# Patient Record
Sex: FEMALE | Race: WHITE | NOT HISPANIC OR LATINO | Employment: OTHER | URBAN - METROPOLITAN AREA
[De-identification: names, ages, dates, MRNs, and addresses within clinical notes are randomized per-mention and may not be internally consistent; named-entity substitution may affect disease eponyms.]

---

## 2017-03-27 ENCOUNTER — TRANSCRIBE ORDERS (OUTPATIENT)
Dept: ADMINISTRATIVE | Facility: HOSPITAL | Age: 82
End: 2017-03-27

## 2017-03-27 DIAGNOSIS — Z12.31 VISIT FOR SCREENING MAMMOGRAM: Primary | ICD-10-CM

## 2017-05-08 ENCOUNTER — HOSPITAL ENCOUNTER (OUTPATIENT)
Dept: MAMMOGRAPHY | Facility: HOSPITAL | Age: 82
Discharge: HOME | End: 2017-05-08
Attending: INTERNAL MEDICINE | Admitting: INTERNAL MEDICINE

## 2017-05-08 DIAGNOSIS — Z12.31 VISIT FOR SCREENING MAMMOGRAM: ICD-10-CM

## 2017-05-08 PROCEDURE — G0202 SCR MAMMO BI INCL CAD: HCPCS

## 2017-05-08 PROCEDURE — 77063 BREAST TOMOSYNTHESIS BI: CPT

## 2017-05-08 PROCEDURE — 77063 BREAST TOMOSYNTHESIS BI: CPT | Performed by: RADIOLOGY

## 2017-05-08 PROCEDURE — G0202 SCR MAMMO BI INCL CAD: HCPCS | Performed by: RADIOLOGY

## 2018-04-10 ENCOUNTER — TRANSCRIBE ORDERS (OUTPATIENT)
Dept: ADMINISTRATIVE | Facility: HOSPITAL | Age: 83
End: 2018-04-10

## 2018-04-10 DIAGNOSIS — Z12.31 VISIT FOR SCREENING MAMMOGRAM: Primary | ICD-10-CM

## 2018-06-04 ENCOUNTER — HOSPITAL ENCOUNTER (OUTPATIENT)
Dept: MAMMOGRAPHY | Facility: HOSPITAL | Age: 83
Discharge: HOME | End: 2018-06-04
Attending: INTERNAL MEDICINE | Admitting: INTERNAL MEDICINE

## 2018-06-04 DIAGNOSIS — Z12.31 VISIT FOR SCREENING MAMMOGRAM: ICD-10-CM

## 2018-06-04 PROCEDURE — 77063 BREAST TOMOSYNTHESIS BI: CPT | Performed by: RADIOLOGY

## 2018-06-04 PROCEDURE — 77067 SCR MAMMO BI INCL CAD: CPT | Performed by: RADIOLOGY

## 2018-06-04 PROCEDURE — 77067 SCR MAMMO BI INCL CAD: CPT

## 2018-06-04 PROCEDURE — 77063 BREAST TOMOSYNTHESIS BI: CPT

## 2019-01-25 PROBLEM — R35.0 URINARY FREQUENCY: Status: ACTIVE | Noted: 2019-01-25

## 2019-01-25 PROBLEM — R35.1 NOCTURIA: Status: ACTIVE | Noted: 2019-01-25

## 2019-01-25 PROBLEM — M81.0 SENILE OSTEOPOROSIS: Status: ACTIVE | Noted: 2019-01-25

## 2019-01-25 PROBLEM — Z98.890 STATUS POST CARPAL TUNNEL RELEASE: Status: ACTIVE | Noted: 2019-01-25

## 2019-01-25 PROBLEM — F51.01 PRIMARY INSOMNIA: Status: ACTIVE | Noted: 2019-01-25

## 2019-01-25 PROBLEM — R42 LIGHT-HEADED FEELING: Status: ACTIVE | Noted: 2019-01-25

## 2019-01-25 PROBLEM — E78.2 MIXED HYPERLIPIDEMIA: Status: ACTIVE | Noted: 2019-01-25

## 2019-01-25 PROBLEM — R03.0 ELEVATED BLOOD PRESSURE READING: Status: ACTIVE | Noted: 2019-01-25

## 2019-03-15 RX ORDER — SIMVASTATIN 10 MG
TABLET ORAL
Qty: 90 TABLET | Refills: 0 | Status: SHIPPED | OUTPATIENT
Start: 2019-03-15 | End: 2019-05-16 | Stop reason: SDUPTHER

## 2019-04-24 PROBLEM — M25.471 ANKLE EDEMA, BILATERAL: Status: ACTIVE | Noted: 2019-04-24

## 2019-04-24 PROBLEM — J30.9 ALLERGIC RHINITIS: Status: ACTIVE | Noted: 2019-04-24

## 2019-04-24 PROBLEM — K64.4 EXTERNAL HEMORRHOID: Status: ACTIVE | Noted: 2019-04-24

## 2019-04-24 PROBLEM — M19.90 OSTEOARTHRITIS: Status: ACTIVE | Noted: 2019-04-24

## 2019-04-24 PROBLEM — M25.472 ANKLE EDEMA, BILATERAL: Status: ACTIVE | Noted: 2019-04-24

## 2019-04-29 ENCOUNTER — TELEPHONE (OUTPATIENT)
Dept: INTERNAL MEDICINE | Facility: CLINIC | Age: 84
End: 2019-04-29

## 2019-04-30 DIAGNOSIS — R35.0 URINARY FREQUENCY: ICD-10-CM

## 2019-04-30 DIAGNOSIS — M81.0 SENILE OSTEOPOROSIS: ICD-10-CM

## 2019-04-30 DIAGNOSIS — R03.0 ELEVATED BLOOD PRESSURE READING: ICD-10-CM

## 2019-04-30 DIAGNOSIS — F51.01 PRIMARY INSOMNIA: ICD-10-CM

## 2019-04-30 DIAGNOSIS — E78.2 MIXED HYPERLIPIDEMIA: Primary | ICD-10-CM

## 2019-05-06 ENCOUNTER — LAB (OUTPATIENT)
Dept: LAB | Facility: HOSPITAL | Age: 84
End: 2019-05-06

## 2019-05-06 ENCOUNTER — OFFICE VISIT (OUTPATIENT)
Dept: INTERNAL MEDICINE | Facility: CLINIC | Age: 84
End: 2019-05-06

## 2019-05-06 VITALS
HEIGHT: 65 IN | BODY MASS INDEX: 20.83 KG/M2 | SYSTOLIC BLOOD PRESSURE: 148 MMHG | HEART RATE: 60 BPM | WEIGHT: 125 LBS | DIASTOLIC BLOOD PRESSURE: 70 MMHG

## 2019-05-06 DIAGNOSIS — F51.01 PRIMARY INSOMNIA: ICD-10-CM

## 2019-05-06 DIAGNOSIS — R03.0 ELEVATED BLOOD PRESSURE READING: ICD-10-CM

## 2019-05-06 DIAGNOSIS — E78.2 MIXED HYPERLIPIDEMIA: Primary | ICD-10-CM

## 2019-05-06 DIAGNOSIS — R35.0 URINARY FREQUENCY: ICD-10-CM

## 2019-05-06 DIAGNOSIS — M81.0 SENILE OSTEOPOROSIS: ICD-10-CM

## 2019-05-06 DIAGNOSIS — E78.2 MIXED HYPERLIPIDEMIA: ICD-10-CM

## 2019-05-06 LAB
25(OH)D3 SERPL-MCNC: 53 NG/ML (ref 30–100)
ALBUMIN SERPL-MCNC: 4.2 G/DL (ref 3.5–5.2)
ALBUMIN/GLOB SERPL: 1.3 G/DL
ALP SERPL-CCNC: 71 U/L (ref 39–117)
ALT SERPL W P-5'-P-CCNC: 14 U/L (ref 1–33)
ANION GAP SERPL CALCULATED.3IONS-SCNC: 10.2 MMOL/L
AST SERPL-CCNC: 28 U/L (ref 1–32)
BASOPHILS # BLD AUTO: 0.02 10*3/MM3 (ref 0–0.2)
BASOPHILS NFR BLD AUTO: 0.5 % (ref 0–1.5)
BILIRUB SERPL-MCNC: 0.6 MG/DL (ref 0.2–1.2)
BUN BLD-MCNC: 11 MG/DL (ref 8–23)
BUN/CREAT SERPL: 13.1 (ref 7–25)
CALCIUM SPEC-SCNC: 9.2 MG/DL (ref 8.6–10.5)
CHLORIDE SERPL-SCNC: 99 MMOL/L (ref 98–107)
CHOLEST SERPL-MCNC: 175 MG/DL (ref 0–200)
CO2 SERPL-SCNC: 26.8 MMOL/L (ref 22–29)
CREAT BLD-MCNC: 0.84 MG/DL (ref 0.57–1)
DEPRECATED RDW RBC AUTO: 45.3 FL (ref 37–54)
EOSINOPHIL # BLD AUTO: 0.06 10*3/MM3 (ref 0–0.4)
EOSINOPHIL NFR BLD AUTO: 1.4 % (ref 0.3–6.2)
ERYTHROCYTE [DISTWIDTH] IN BLOOD BY AUTOMATED COUNT: 12.9 % (ref 12.3–15.4)
GFR SERPL CREATININE-BSD FRML MDRD: 65 ML/MIN/1.73
GLOBULIN UR ELPH-MCNC: 3.2 GM/DL
GLUCOSE BLD-MCNC: 98 MG/DL (ref 65–99)
HCT VFR BLD AUTO: 41.8 % (ref 34–46.6)
HDLC SERPL-MCNC: 90 MG/DL (ref 40–60)
HGB BLD-MCNC: 13.2 G/DL (ref 12–15.9)
IMM GRANULOCYTES # BLD AUTO: 0.01 10*3/MM3 (ref 0–0.05)
IMM GRANULOCYTES NFR BLD AUTO: 0.2 % (ref 0–0.5)
LDLC SERPL CALC-MCNC: 71 MG/DL (ref 0–100)
LDLC/HDLC SERPL: 0.78 {RATIO}
LYMPHOCYTES # BLD AUTO: 1.32 10*3/MM3 (ref 0.7–3.1)
LYMPHOCYTES NFR BLD AUTO: 29.9 % (ref 19.6–45.3)
MCH RBC QN AUTO: 30.1 PG (ref 26.6–33)
MCHC RBC AUTO-ENTMCNC: 31.6 G/DL (ref 31.5–35.7)
MCV RBC AUTO: 95.2 FL (ref 79–97)
MONOCYTES # BLD AUTO: 0.46 10*3/MM3 (ref 0.1–0.9)
MONOCYTES NFR BLD AUTO: 10.4 % (ref 5–12)
NEUTROPHILS # BLD AUTO: 2.54 10*3/MM3 (ref 1.7–7)
NEUTROPHILS NFR BLD AUTO: 57.6 % (ref 42.7–76)
NRBC BLD AUTO-RTO: 0 /100 WBC (ref 0–0.2)
PLATELET # BLD AUTO: 229 10*3/MM3 (ref 140–450)
PMV BLD AUTO: 12.2 FL (ref 6–12)
POTASSIUM BLD-SCNC: 3.8 MMOL/L (ref 3.5–5.2)
PROT SERPL-MCNC: 7.4 G/DL (ref 6–8.5)
RBC # BLD AUTO: 4.39 10*6/MM3 (ref 3.77–5.28)
SODIUM BLD-SCNC: 136 MMOL/L (ref 136–145)
TRIGL SERPL-MCNC: 72 MG/DL (ref 0–150)
VLDLC SERPL-MCNC: 14.4 MG/DL (ref 5–40)
WBC NRBC COR # BLD: 4.41 10*3/MM3 (ref 3.4–10.8)

## 2019-05-06 PROCEDURE — 80053 COMPREHEN METABOLIC PANEL: CPT

## 2019-05-06 PROCEDURE — 82306 VITAMIN D 25 HYDROXY: CPT

## 2019-05-06 PROCEDURE — G0439 PPPS, SUBSEQ VISIT: HCPCS | Performed by: PHYSICIAN ASSISTANT

## 2019-05-06 PROCEDURE — 82043 UR ALBUMIN QUANTITATIVE: CPT

## 2019-05-06 PROCEDURE — 81003 URINALYSIS AUTO W/O SCOPE: CPT

## 2019-05-06 PROCEDURE — 80061 LIPID PANEL: CPT

## 2019-05-06 PROCEDURE — 85025 COMPLETE CBC W/AUTO DIFF WBC: CPT

## 2019-05-06 RX ORDER — TOLTERODINE 4 MG/1
1 CAPSULE, EXTENDED RELEASE ORAL DAILY
Refills: 11 | COMMUNITY
Start: 2019-02-06 | End: 2019-05-06

## 2019-05-06 RX ORDER — CHLORAL HYDRATE 500 MG
1 CAPSULE ORAL DAILY
COMMUNITY
Start: 2009-08-17 | End: 2022-06-16

## 2019-05-06 RX ORDER — AMITRIPTYLINE HYDROCHLORIDE 25 MG/1
1 TABLET, FILM COATED ORAL DAILY
Refills: 1 | COMMUNITY
Start: 2019-04-29 | End: 2020-06-10

## 2019-05-06 RX ORDER — ZOLPIDEM TARTRATE 10 MG/1
1 TABLET ORAL NIGHTLY PRN
COMMUNITY
Start: 2018-05-21 | End: 2019-05-16 | Stop reason: SDUPTHER

## 2019-05-06 RX ORDER — DIMENHYDRINATE 50 MG
1 TABLET ORAL DAILY
COMMUNITY
End: 2022-06-16

## 2019-05-06 NOTE — ASSESSMENT & PLAN NOTE
Elevated at repeat BP.  Discussed with patient.  Continue recording BP until next visit with Dr. Lord in 10 days.

## 2019-05-06 NOTE — PROGRESS NOTES
QUICK REFERENCE INFORMATION:  The ABCs of the Annual Wellness Visit    Subsequent Medicare Wellness Visit    HEALTH RISK ASSESSMENT    1935    Recent Hospitalizations:  No hospitalization(s) within the last year..        Current Medical Providers:  Patient Care Team:  Brisa Lord MD as PCP - General  Brisa Lord MD as PCP - Family Medicine        Smoking Status:  Social History     Tobacco Use   Smoking Status Never Smoker   Smokeless Tobacco Never Used       Alcohol Consumption:  Social History     Substance and Sexual Activity   Alcohol Use Yes   • Frequency: 4 or more times a week   • Drinks per session: 1 or 2       Depression Screen:   PHQ-2/PHQ-9 Depression Screening 5/6/2019   Little interest or pleasure in doing things 0   Feeling down, depressed, or hopeless 0   Total Score 0       Health Habits and Functional and Cognitive Screening:  Functional & Cognitive Status 5/6/2019   Do you have difficulty preparing food and eating? No   Do you have difficulty bathing yourself, getting dressed or grooming yourself? No   Do you have difficulty using the toilet? No   Do you have difficulty moving around from place to place? No   Do you have trouble with steps or getting out of a bed or a chair? No   In the past year have you fallen or experienced a near fall? No   Current Diet Well Balanced Diet   Dental Exam Up to date   Eye Exam Up to date   Exercise (times per week) 3 times per week   Current Exercise Activities Include Walking   Do you need help using the phone?  No   Are you deaf or do you have serious difficulty hearing?  No   Do you need help with transportation? No   Do you need help shopping? No   Do you need help preparing meals?  No   Do you need help with housework?  No   Do you need help with laundry? No   Do you need help taking your medications? No   Do you need help managing money? No   Do you ever drive or ride in a car without wearing a seat belt? No   Have you felt unusual stress,  anger or loneliness in the last month? No   Who do you live with? Spouse   If you need help, do you have trouble finding someone available to you? No   Have you been bothered in the last four weeks by sexual problems? No   Do you have difficulty concentrating, remembering or making decisions? No           Does the patient have evidence of cognitive impairment? No    ATTENTION  What is the year: correct  What is the month of the year: correct  What is the day of the week?: correct  What is the date?: correct  MEMORY  Repeat address three times, only score third attempt: Amandeep Nye [address] [place] [address]: 5  HOW MANY ANIMALS DID THE PATIENT NAME  Verbal Fluency -- Animal Names (0-25): 22+  CLOCK DRAWING  Clock Drawing: All Correct  MEMORY RECALL  Tell me what you remember about that name and address we were repeating at the beginnin  ACE TOTAL SCORE  Total ACE Score - <25/30 strongly suggests cognitive impairment; <21/30 almost certainly shows dementia: 28       Aspirin use counseling: Taking ASA appropriately as indicated      Recent Lab Results:  CMP:     HbA1c:  No results found for: HGBA1C  Microalbumin:  No results found for: MICROALBUR, POCMALB, POCCREAT  Lipid Panel  No results found for: CHOL, TRIG, HDL, LDL, AST, ALT    Visual Acuity:  No exam data present    Age-appropriate Screening Schedule:  Refer to the list below for future screening recommendations based on patient's age, sex and/or medical conditions. Orders for these recommended tests are listed in the plan section. The patient has been provided with a written plan.    Health Maintenance   Topic Date Due   • TDAP/TD VACCINES (1 - Tdap) 1956   • ZOSTER VACCINE (2 of 3) 2011   • DXA SCAN  2019   • PNEUMOCOCCAL VACCINES (65+ LOW/MEDIUM RISK) (2 of 2 - PPSV23) 2019   • LIPID PANEL  2019   • INFLUENZA VACCINE  2019   • MAMMOGRAM  2020        Subjective   History of Present Illness    Kamille BARBOSA  Ginger is a 83 y.o. female who presents for an Subsequent Wellness Visit.    The following portions of the patient's history were reviewed and updated as appropriate: allergies, current medications, past family history, past medical history, past social history, past surgical history and problem list.    Outpatient Medications Prior to Visit   Medication Sig Dispense Refill   • aspirin 81 MG tablet Take 1 tablet by mouth Daily.     • Coenzyme Q10 (CO Q-10) 100 MG capsule Take 1 capsule by mouth Daily.     • Omega-3 Fatty Acids (FISH OIL) 1000 MG capsule capsule Take 1 capsule by mouth Daily.     • Pediatric Multiple Vitamins (CHEWABLE MULTIPLE VITAMINS PO) Take 1 tablet by mouth Daily.     • Probiotic Product (TRUBIOTICS PO) Take 1 capsule by mouth Daily.     • simvastatin (ZOCOR) 10 MG tablet TAKE 1 TABLET BY MOUTH EVERY EVENING 90 tablet 0   • zolpidem (AMBIEN) 10 MG tablet Take 1 tablet by mouth At Night As Needed.     • amitriptyline (ELAVIL) 25 MG tablet Take 1 tablet by mouth Daily.  1   • oxyCODONE-acetaminophen (PERCOCET) 5-325 MG per tablet Take 1-2 tablets by mouth every 4-6 hours as needed 10 tablet 0   • Mirabegron ER (MYRBETRIQ) 25 MG tablet sustained-release 24 hour 24 hr tablet Take 1 tablet by mouth Daily.     • tolterodine LA (DETROL LA) 4 MG 24 hr capsule Take 1 capsule by mouth Daily.  11     No facility-administered medications prior to visit.        Patient Active Problem List   Diagnosis   • Urinary frequency   • Light-headed feeling   • Elevated blood pressure reading   • Mixed hyperlipidemia   • Senile osteoporosis   • Primary insomnia   • Nocturia   • Status post carpal tunnel release   • Allergic rhinitis   • Ankle edema, bilateral   • External hemorrhoid   • Osteoarthritis       Advance Care Planning:  power of  for healthcare on file    Identification of Risk Factors:  Risk factors include: cardiovascular risk.    Review of Systems   Constitutional: Negative for chills, fatigue  "and fever.   HENT: Negative for congestion, ear pain and sinus pressure.    Respiratory: Negative for cough, chest tightness, shortness of breath and wheezing.    Cardiovascular: Negative for chest pain and palpitations.   Gastrointestinal: Negative for abdominal pain, blood in stool and constipation.   Skin: Negative for color change.   Allergic/Immunologic: Negative for environmental allergies.   Neurological: Negative for dizziness, speech difficulty and headaches.   Psychiatric/Behavioral: Negative for confusion. The patient is not nervous/anxious.        Compared to one year ago, the patient feels her physical health is the same.  Compared to one year ago, the patient feels her mental health is the same.    Objective     Physical Exam     Procedures     Vitals:    05/06/19 1013 05/06/19 1035   BP: 162/90 148/70   BP Location: Left arm    Patient Position: Sitting    Cuff Size: Adult    Pulse: 60    Weight: 56.7 kg (125 lb)    Height: 165.7 cm (65.25\")    PainSc:   5    PainLoc: Hand        Patient's Body mass index is 20.64 kg/m². BMI is within normal parameters. No follow-up required..      Assessment/Plan   Problem List Items Addressed This Visit        Cardiovascular and Mediastinum    Elevated blood pressure reading    Current Assessment & Plan     Elevated at repeat BP.  Discussed with patient.  Continue recording BP until next visit with Dr. Lord in 10 days.         Mixed hyperlipidemia - Primary    Relevant Medications    simvastatin (ZOCOR) 10 MG tablet       Musculoskeletal and Integument    Senile osteoporosis       Genitourinary    Urinary frequency       Other    Primary insomnia        Patient Self-Management and Personalized Health Advice  The patient has been provided with information about: blood pressure and preventive services including:   · Counseling for cardiovascular disease risk reduction.    Visit Diagnoses:    ICD-10-CM ICD-9-CM   1. Mixed hyperlipidemia E78.2 272.2   2. Elevated " blood pressure reading R03.0 796.2   3. Senile osteoporosis M81.0 733.01   4. Urinary frequency R35.0 788.41   5. Primary insomnia F51.01 307.42       No orders of the defined types were placed in this encounter.      Outpatient Encounter Medications as of 5/6/2019   Medication Sig Dispense Refill   • aspirin 81 MG tablet Take 1 tablet by mouth Daily.     • Coenzyme Q10 (CO Q-10) 100 MG capsule Take 1 capsule by mouth Daily.     • Omega-3 Fatty Acids (FISH OIL) 1000 MG capsule capsule Take 1 capsule by mouth Daily.     • Pediatric Multiple Vitamins (CHEWABLE MULTIPLE VITAMINS PO) Take 1 tablet by mouth Daily.     • Probiotic Product (TRUBIOTICS PO) Take 1 capsule by mouth Daily.     • simvastatin (ZOCOR) 10 MG tablet TAKE 1 TABLET BY MOUTH EVERY EVENING 90 tablet 0   • zolpidem (AMBIEN) 10 MG tablet Take 1 tablet by mouth At Night As Needed.     • amitriptyline (ELAVIL) 25 MG tablet Take 1 tablet by mouth Daily.  1   • oxyCODONE-acetaminophen (PERCOCET) 5-325 MG per tablet Take 1-2 tablets by mouth every 4-6 hours as needed 10 tablet 0   • [DISCONTINUED] Mirabegron ER (MYRBETRIQ) 25 MG tablet sustained-release 24 hour 24 hr tablet Take 1 tablet by mouth Daily.     • [DISCONTINUED] tolterodine LA (DETROL LA) 4 MG 24 hr capsule Take 1 capsule by mouth Daily.  11     No facility-administered encounter medications on file as of 5/6/2019.        Reviewed use of high risk medication in the elderly: yes  Reviewed for potential of harmful drug interactions in the elderly: yes    Follow Up:  Return in about 10 days (around 5/16/2019) for Annual physical.     An After Visit Summary and PPPS with all of these plans were given to the patient.

## 2019-05-06 NOTE — PATIENT INSTRUCTIONS
Medicare Wellness  Personal Prevention Plan of Service     Date of Office Visit:  2019  Encounter Provider:  Devorah Mistry PA-C  Place of Service:  [facility]  Patient Name: Kamille Miles  :  1935    As part of the Medicare Wellness portion of your visit today, we are providing you with this personalized preventive plan of services (PPPS). This plan is based upon recommendations of the [place] Preventive Services Task Force (USPSTF) and the Advisory Committee on Immunization Practices (ACIP).    This lists the preventive care services that should be considered, and provides dates of when you are due. Items listed as completed are up-to-date and do not require any further intervention.    Health Maintenance   Topic Date Due   • TDAP/TD VACCINES (1 - Tdap) 1956   • ZOSTER VACCINE (2 of 3) 2011   • DXA SCAN  2019   • MEDICARE ANNUAL WELLNESS  2019   • PNEUMOCOCCAL VACCINES (65+ LOW/MEDIUM RISK) (2 of 2 - PPSV23) 2019   • LIPID PANEL  2019   • INFLUENZA VACCINE  2019   • MAMMOGRAM  2020       No orders of the defined types were placed in this encounter.      Return in about 10 days (around 2019) for Annual physical.

## 2019-05-07 LAB
ALBUMIN UR-MCNC: <1.2 MG/L
BILIRUB UR QL STRIP: NEGATIVE
CLARITY UR: CLEAR
COLOR UR: YELLOW
GLUCOSE UR STRIP-MCNC: NEGATIVE MG/DL
HGB UR QL STRIP.AUTO: NEGATIVE
KETONES UR QL STRIP: NEGATIVE
LEUKOCYTE ESTERASE UR QL STRIP.AUTO: NEGATIVE
NITRITE UR QL STRIP: NEGATIVE
PH UR STRIP.AUTO: 5.5 [PH] (ref 5–8)
PROT UR QL STRIP: NEGATIVE
SP GR UR STRIP: <=1.005 (ref 1–1.03)
UROBILINOGEN UR QL STRIP: NORMAL

## 2019-05-16 ENCOUNTER — OFFICE VISIT (OUTPATIENT)
Dept: INTERNAL MEDICINE | Facility: CLINIC | Age: 84
End: 2019-05-16

## 2019-05-16 VITALS
BODY MASS INDEX: 20.66 KG/M2 | HEART RATE: 60 BPM | SYSTOLIC BLOOD PRESSURE: 120 MMHG | DIASTOLIC BLOOD PRESSURE: 84 MMHG | HEIGHT: 65 IN | WEIGHT: 124 LBS

## 2019-05-16 DIAGNOSIS — H61.21 IMPACTED CERUMEN OF RIGHT EAR: ICD-10-CM

## 2019-05-16 DIAGNOSIS — R35.1 NOCTURIA: ICD-10-CM

## 2019-05-16 DIAGNOSIS — M25.471 ANKLE EDEMA, BILATERAL: ICD-10-CM

## 2019-05-16 DIAGNOSIS — M25.472 ANKLE EDEMA, BILATERAL: ICD-10-CM

## 2019-05-16 DIAGNOSIS — F51.01 PRIMARY INSOMNIA: ICD-10-CM

## 2019-05-16 DIAGNOSIS — R03.0 ELEVATED BLOOD PRESSURE READING: Primary | ICD-10-CM

## 2019-05-16 DIAGNOSIS — M81.0 SENILE OSTEOPOROSIS: ICD-10-CM

## 2019-05-16 DIAGNOSIS — E78.2 MIXED HYPERLIPIDEMIA: ICD-10-CM

## 2019-05-16 PROCEDURE — 69210 REMOVE IMPACTED EAR WAX UNI: CPT | Performed by: INTERNAL MEDICINE

## 2019-05-16 PROCEDURE — 99214 OFFICE O/P EST MOD 30 MIN: CPT | Performed by: INTERNAL MEDICINE

## 2019-05-16 PROCEDURE — 93000 ELECTROCARDIOGRAM COMPLETE: CPT | Performed by: INTERNAL MEDICINE

## 2019-05-16 RX ORDER — DIAPER,BRIEF,INFANT-TODD,DISP
EACH MISCELLANEOUS 2 TIMES DAILY PRN
Qty: 1 EACH | Refills: 0 | Status: SHIPPED | OUTPATIENT
Start: 2019-05-16

## 2019-05-16 RX ORDER — ZOSTER VACCINE RECOMBINANT, ADJUVANTED 50 MCG/0.5
KIT INTRAMUSCULAR
Refills: 0 | COMMUNITY
Start: 2019-05-14 | End: 2019-05-16

## 2019-05-16 RX ORDER — SIMVASTATIN 10 MG
10 TABLET ORAL EVERY EVENING
Qty: 90 TABLET | Refills: 1 | Status: SHIPPED | OUTPATIENT
Start: 2019-05-16 | End: 2019-11-22 | Stop reason: SDUPTHER

## 2019-05-16 RX ORDER — ZOLPIDEM TARTRATE 10 MG/1
10 TABLET ORAL NIGHTLY PRN
Qty: 30 TABLET | Refills: 1 | Status: SHIPPED | OUTPATIENT
Start: 2019-05-16 | End: 2019-05-17

## 2019-05-16 NOTE — PROGRESS NOTES
[facility]     Kamille Miles  1935   [id number]      Patient Care Team:  Brisa Lord MD as PCP - General  Brisa Lord MD as PCP - Family Medicine    Chief Complaint;:   Chief Complaint   Patient presents with   • Hypertension     follow-up, states it has been running high at home as well   • Hyperlipidemia            HPI  Patient is a 83 y.o. female presents with low back pain described as ache . Onset of symptoms was abrupt starting 6 months ago.  Chronicitychronic. Severity mild.  Symptoms are associated with L buttock muscle still sore. Pertinent negativesNo radiation of pain.   Symptoms are aggravated by Feels it more when lye down at night.   Symptoms improve with improving slowly over time.  Context Sat down on arm of couch and hit to left of coccyx hard.    CHRONIC CONDITIONS  Went to urologist in [place] and tried tolterodine and myrbetriq and also tried them together.No help. He then gave her amitriptyline to try at night to decrease nocturia. She hasn't started it yet.    She does eat a low-fat diet and exercises regularly doing Pilates and walking in Silver sneakers.  She takes simvastatin every evening.    For sleep, she takes a tiny bit off of the Ambien tablet when she needs it.  It works well for her and no side effects.    She does take vitamin D and calcium and does weightbearing exercises regularly for osteoporosis.    Ears get clogged up with wax frequently.  She asked if we would wash them out today.    Very mild ankle edema when traveling or at the end of a long day.  It goes away overnight.  No aching or pain in her varicose veins.  She did buy some support hose for travel.    Past Medical History:   Diagnosis Date   • Headache     cervicogenic -work-up by neurologist in [place], [place]       History reviewed. No pertinent surgical history.    Family History   Problem Relation Age of Onset   • Arrhythmia Mother         Pacemaker placed   • Lymphoma Father     • Breast cancer Neg Hx    • Ovarian cancer Neg Hx        Social History     Socioeconomic History   • Marital status:      Spouse name: Not on file   • Number of children: Not on file   • Years of education: Not on file   • Highest education level: Not on file   Tobacco Use   • Smoking status: Never Smoker   • Smokeless tobacco: Never Used   Substance and Sexual Activity   • Alcohol use: Yes     Frequency: 4 or more times a week     Drinks per session: 1 or 2   • Drug use: No   • Sexual activity: Defer       No Known Allergies    Review of Systems:     Review of Systems   Constitutional: Negative for appetite change, chills, diaphoresis, fatigue, fever and unexpected weight gain.   HENT: Negative for congestion, ear pain, hearing loss, nosebleeds, rhinorrhea, sore throat, trouble swallowing and voice change.         Right ear stopped up   Eyes: Negative for pain, redness, itching and visual disturbance.   Respiratory: Negative for cough, shortness of breath and wheezing.    Cardiovascular: Positive for leg swelling. Negative for chest pain and palpitations.   Gastrointestinal: Negative for abdominal pain, blood in stool, constipation, diarrhea, nausea, vomiting and GERD.   Endocrine: Negative for cold intolerance and heat intolerance.   Genitourinary: Negative for urinary incontinence, dysuria, frequency, hematuria and urgency.   Musculoskeletal: Negative for arthralgias, gait problem, joint swelling and myalgias.   Skin: Negative for color change and rash.   Allergic/Immunologic: Positive for environmental allergies. Negative for food allergies and immunocompromised state.   Neurological: Negative for dizziness, seizures, syncope, weakness, light-headedness and numbness.   Hematological: Negative for adenopathy. Does not bruise/bleed easily.   Psychiatric/Behavioral: Negative for behavioral problems, sleep disturbance, suicidal ideas and depressed mood. The patient is not nervous/anxious.        Vital  "Signs  Vitals:    05/16/19 1016   BP: 120/84   BP Location: Left arm   Patient Position: Sitting   Cuff Size: Adult   Pulse: 60   Weight: 56.2 kg (124 lb)   Height: 165.7 cm (65.25\")     Body mass index is 20.48 kg/m².      Current Outpatient Medications:   •  amitriptyline (ELAVIL) 25 MG tablet, Take 1 tablet by mouth Daily., Disp: , Rfl: 1  •  aspirin 81 MG tablet, Take 1 tablet by mouth Daily., Disp: , Rfl:   •  Coenzyme Q10 (CO Q-10) 100 MG capsule, Take 1 capsule by mouth Daily., Disp: , Rfl:   •  Omega-3 Fatty Acids (FISH OIL) 1000 MG capsule capsule, Take 1 capsule by mouth Daily., Disp: , Rfl:   •  Pediatric Multiple Vitamins (CHEWABLE MULTIPLE VITAMINS PO), Take 1 tablet by mouth Daily., Disp: , Rfl:   •  Probiotic Product (TRUBIOTICS PO), Take 1 capsule by mouth Daily., Disp: , Rfl:   •  simvastatin (ZOCOR) 10 MG tablet, Take 1 tablet by mouth Every Evening., Disp: 90 tablet, Rfl: 1  •  hydrocortisone 1 % cream, Apply  topically to the appropriate area as directed 2 (Two) Times a Day As Needed for Rash (itching)., Disp: 1 each, Rfl: 0  •  zolpidem (AMBIEN) 10 MG tablet, Take 1 tablet by mouth At Night As Needed for Sleep., Disp: 30 tablet, Rfl: 0    Physical Exam:    Physical Exam   Constitutional: She is oriented to person, place, and time. She appears well-developed and well-nourished.   HENT:   Head: Normocephalic.   Right Ear: Tympanic membrane normal. cerumen impaction is present.  Left Ear: Tympanic membrane and ear canal normal.   Eyes: Conjunctivae and EOM are normal. Pupils are equal, round, and reactive to light.   Neck: Normal range of motion. Neck supple. No thyromegaly present.   Cardiovascular: Normal rate, regular rhythm, normal heart sounds and intact distal pulses.   No murmur heard.  Pulmonary/Chest: Effort normal and breath sounds normal. She has no wheezes. Right breast exhibits no inverted nipple, no mass, no nipple discharge, no skin change and no tenderness. Left breast exhibits no " inverted nipple, no mass, no nipple discharge, no skin change and no tenderness.   Abdominal: Soft. Bowel sounds are normal. She exhibits no distension and no mass. There is no tenderness.   Musculoskeletal: Normal range of motion. She exhibits no edema or tenderness.   Lymphadenopathy:        Head (right side): No submental, no submandibular, no preauricular and no posterior auricular adenopathy present.        Head (left side): No submental, no submandibular, no preauricular and no posterior auricular adenopathy present.     She has no cervical adenopathy.     She has no axillary adenopathy.   Neurological: She is alert and oriented to person, place, and time. She has normal strength. No cranial nerve deficit or sensory deficit. Coordination and gait normal.   Skin: Skin is warm and dry. No rash noted.   Psychiatric: She has a normal mood and affect. Her speech is normal and behavior is normal. Judgment and thought content normal. Cognition and memory are normal.   Nursing note and vitals reviewed.        ECG 12 Lead  Date/Time: 5/16/2019 11:31 AM  Performed by: Brisa Lord MD  Authorized by: Brisa Lord MD   Comparison: compared with previous ECG from 5/16/2018  Similar to previous ECG  Rhythm: sinus rhythm  Ectopy: atrial premature contractions  Rate: normal  BPM: 63  Conduction: conduction normal  ST Segments: ST segments normal  T Waves: T waves normal    Clinical impression: normal ECG             ACE III MINI        Results Review:    I reviewed the patient's new clinical results.  We reviewed her recent labs with LDL excellent at 71.  Good renal function.    CMP:  Lab Results   Component Value Date    BUN 11 05/06/2019    CREATININE 0.84 05/06/2019    EGFRIFNONA 65 05/06/2019    BCR 13.1 05/06/2019     05/06/2019    K 3.8 05/06/2019    CO2 26.8 05/06/2019    CALCIUM 9.2 05/06/2019    ALBUMIN 4.20 05/06/2019    BILITOT 0.6 05/06/2019    ALKPHOS 71 05/06/2019    AST 28 05/06/2019    ALT 14  05/06/2019     HbA1c:  No results found for: HGBA1C  Microalbumin:  Lab Results   Component Value Date    MICROALBUR <1.2 05/06/2019     Lipid Panel  Lab Results   Component Value Date    CHOL 175 05/06/2019    TRIG 72 05/06/2019    HDL 90 (H) 05/06/2019    LDL 71 05/06/2019    AST 28 05/06/2019    ALT 14 05/06/2019       Medication Review: Medications reviewed and noted    Problem List Items Addressed This Visit        Cardiovascular and Mediastinum    Elevated blood pressure reading - Primary    Mixed hyperlipidemia    Relevant Medications    simvastatin (ZOCOR) 10 MG tablet    Other Relevant Orders    ECG 12 Lead       Musculoskeletal and Integument    Senile osteoporosis       Genitourinary    Nocturia       Other    Primary insomnia    Ankle edema, bilateral      Other Visit Diagnoses     Impacted cerumen of right ear        Relevant Orders    Ear Cerumen Removal Instrumentation           Patient Instructions   For nocturia, she will start taking the 25 mg amitriptyline tablet every evening.  It was recommended by her urologist.  She has already tried other treatments.  She will let us know if she has any side effects with it and we may be able to adjust the dose.    She will monitor her blood pressures at home.  Avoid salt in the diet continue regular exercise.    For hyper lipidemia, continue taking simvastatin in the evening.    For osteoporosis, continue weightbearing exercise on the feet and with hand weights or upper body weight machines.  Continue taking vitamin D and calcium.    For insomnia, avoid the TV and computer and phone close to bedtime.  Relax and read close to bedtime.  May continue using a small dose of Ambien as needed.    Impacted cerumen was removed.    To prevent pedal edema, wear support hose or support socks when traveling.  Drink plenty of fluids and avoid salt in the diet.  Also avoid soft drinks since they have sodium in them.      Plan of care reviewed with patient at the conclusion  of today's visit. Education was provided regarding diagnosis, management, and any prescribed or recommended OTC medications.Patient verbalizes understanding of and agreement with management plan.         Brisa Lord MD

## 2019-05-16 NOTE — PROGRESS NOTES
Ear Cerumen Removal Instrumentation  Date/Time: 5/16/2019 11:38 AM  Performed by: Brisa Lord MD  Authorized by: Brisa Lord MD     Anesthesia:  Local Anesthetic: none  Location details: right ear  Patient tolerance: Patient tolerated the procedure well with no immediate complications  Procedure type: curette   Sedation:  Patient sedated: no

## 2019-05-17 RX ORDER — ZOLPIDEM TARTRATE 10 MG/1
10 TABLET ORAL NIGHTLY PRN
Qty: 30 TABLET | Refills: 0 | Status: SHIPPED | OUTPATIENT
Start: 2019-05-17 | End: 2019-08-26 | Stop reason: SDUPTHER

## 2019-05-18 NOTE — PATIENT INSTRUCTIONS
For nocturia, she will start taking the 25 mg amitriptyline tablet every evening.  It was recommended by her urologist.  She has already tried other treatments.  She will let us know if she has any side effects with it and we may be able to adjust the dose.    She will monitor her blood pressures at home.  Avoid salt in the diet continue regular exercise.    For hyper lipidemia, continue taking simvastatin in the evening.    For osteoporosis, continue weightbearing exercise on the feet and with hand weights or upper body weight machines.  Continue taking vitamin D and calcium.    For insomnia, avoid the TV and computer and phone close to bedtime.  Relax and read close to bedtime.  May continue using a small dose of Ambien as needed.    Impacted cerumen was removed.    To prevent pedal edema, wear support hose or support socks when traveling.  Drink plenty of fluids and avoid salt in the diet.  Also avoid soft drinks since they have sodium in them.

## 2019-08-07 ENCOUNTER — TELEPHONE (OUTPATIENT)
Dept: INTERNAL MEDICINE | Facility: CLINIC | Age: 84
End: 2019-08-07

## 2019-08-07 NOTE — TELEPHONE ENCOUNTER
Pt called to update her chart on her vaccines       Shingles - 7/23/19 at [facility]  I added it to pt's chart

## 2019-08-20 ENCOUNTER — TRANSCRIBE ORDERS (OUTPATIENT)
Dept: INTERNAL MEDICINE | Facility: CLINIC | Age: 84
End: 2019-08-20

## 2019-08-27 RX ORDER — ZOLPIDEM TARTRATE 10 MG/1
10 TABLET ORAL NIGHTLY PRN
Qty: 30 TABLET | Refills: 0 | Status: SHIPPED | OUTPATIENT
Start: 2019-08-27 | End: 2019-12-12 | Stop reason: SDUPTHER

## 2019-11-22 DIAGNOSIS — E78.2 MIXED HYPERLIPIDEMIA: ICD-10-CM

## 2019-11-22 RX ORDER — SIMVASTATIN 10 MG
10 TABLET ORAL EVERY EVENING
Qty: 90 TABLET | Refills: 1 | Status: SHIPPED | OUTPATIENT
Start: 2019-11-22 | End: 2020-05-26

## 2019-12-12 ENCOUNTER — TELEPHONE (OUTPATIENT)
Dept: INTERNAL MEDICINE | Facility: CLINIC | Age: 84
End: 2019-12-12

## 2019-12-12 DIAGNOSIS — F51.01 PRIMARY INSOMNIA: Primary | ICD-10-CM

## 2019-12-12 RX ORDER — ZOLPIDEM TARTRATE 10 MG/1
10 TABLET ORAL NIGHTLY PRN
Qty: 30 TABLET | Refills: 0 | Status: SHIPPED | OUTPATIENT
Start: 2019-12-12 | End: 2019-12-19 | Stop reason: SDUPTHER

## 2019-12-12 NOTE — TELEPHONE ENCOUNTER
Pt is requesting a refill of her zolpidem [facility]

## 2019-12-18 DIAGNOSIS — F51.01 PRIMARY INSOMNIA: ICD-10-CM

## 2019-12-18 RX ORDER — ZOLPIDEM TARTRATE 10 MG/1
10 TABLET ORAL NIGHTLY PRN
Qty: 30 TABLET | Refills: 0 | OUTPATIENT
Start: 2019-12-18

## 2019-12-19 DIAGNOSIS — F51.01 PRIMARY INSOMNIA: ICD-10-CM

## 2019-12-19 RX ORDER — ZOLPIDEM TARTRATE 10 MG/1
10 TABLET ORAL NIGHTLY PRN
Qty: 30 TABLET | Refills: 0 | Status: SHIPPED | OUTPATIENT
Start: 2019-12-19 | End: 2020-12-14 | Stop reason: SDUPTHER

## 2020-05-01 DIAGNOSIS — E78.2 MIXED HYPERLIPIDEMIA: Primary | ICD-10-CM

## 2020-05-01 DIAGNOSIS — M81.0 SENILE OSTEOPOROSIS: ICD-10-CM

## 2020-05-26 DIAGNOSIS — E78.2 MIXED HYPERLIPIDEMIA: ICD-10-CM

## 2020-05-26 RX ORDER — SIMVASTATIN 10 MG
10 TABLET ORAL EVERY EVENING
Qty: 90 TABLET | Refills: 1 | Status: SHIPPED | OUTPATIENT
Start: 2020-05-26 | End: 2021-02-03

## 2020-06-03 ENCOUNTER — TELEPHONE (OUTPATIENT)
Dept: INTERNAL MEDICINE | Facility: CLINIC | Age: 85
End: 2020-06-03

## 2020-06-10 ENCOUNTER — LAB (OUTPATIENT)
Dept: LAB | Facility: HOSPITAL | Age: 85
End: 2020-06-10

## 2020-06-10 ENCOUNTER — OFFICE VISIT (OUTPATIENT)
Dept: INTERNAL MEDICINE | Facility: CLINIC | Age: 85
End: 2020-06-10

## 2020-06-10 VITALS
TEMPERATURE: 98.3 F | BODY MASS INDEX: 20.25 KG/M2 | DIASTOLIC BLOOD PRESSURE: 78 MMHG | HEART RATE: 76 BPM | SYSTOLIC BLOOD PRESSURE: 142 MMHG | WEIGHT: 126 LBS | HEIGHT: 66 IN

## 2020-06-10 DIAGNOSIS — R03.0 ELEVATED BLOOD PRESSURE READING: Primary | ICD-10-CM

## 2020-06-10 DIAGNOSIS — R03.0 ELEVATED BLOOD PRESSURE READING: ICD-10-CM

## 2020-06-10 DIAGNOSIS — E78.2 MIXED HYPERLIPIDEMIA: ICD-10-CM

## 2020-06-10 DIAGNOSIS — M81.0 SENILE OSTEOPOROSIS: ICD-10-CM

## 2020-06-10 DIAGNOSIS — F51.01 PRIMARY INSOMNIA: ICD-10-CM

## 2020-06-10 LAB
ALBUMIN SERPL-MCNC: 4.4 G/DL (ref 3.5–5.2)
ALBUMIN/GLOB SERPL: 1.4 G/DL
ALP SERPL-CCNC: 64 U/L (ref 39–117)
ALT SERPL W P-5'-P-CCNC: 15 U/L (ref 1–33)
ANION GAP SERPL CALCULATED.3IONS-SCNC: 10.4 MMOL/L (ref 5–15)
AST SERPL-CCNC: 33 U/L (ref 1–32)
BASOPHILS # BLD AUTO: 0.01 10*3/MM3 (ref 0–0.2)
BASOPHILS NFR BLD AUTO: 0.2 % (ref 0–1.5)
BILIRUB SERPL-MCNC: 0.6 MG/DL (ref 0.2–1.2)
BILIRUB UR QL STRIP: NEGATIVE
BUN BLD-MCNC: 13 MG/DL (ref 8–23)
BUN/CREAT SERPL: 14.6 (ref 7–25)
CALCIUM SPEC-SCNC: 9.8 MG/DL (ref 8.6–10.5)
CHLORIDE SERPL-SCNC: 100 MMOL/L (ref 98–107)
CHOLEST SERPL-MCNC: 179 MG/DL (ref 0–200)
CLARITY UR: CLEAR
CO2 SERPL-SCNC: 28.6 MMOL/L (ref 22–29)
COLOR UR: YELLOW
CREAT BLD-MCNC: 0.89 MG/DL (ref 0.57–1)
DEPRECATED RDW RBC AUTO: 42.5 FL (ref 37–54)
EOSINOPHIL # BLD AUTO: 0.05 10*3/MM3 (ref 0–0.4)
EOSINOPHIL NFR BLD AUTO: 1.2 % (ref 0.3–6.2)
ERYTHROCYTE [DISTWIDTH] IN BLOOD BY AUTOMATED COUNT: 12.4 % (ref 12.3–15.4)
GFR SERPL CREATININE-BSD FRML MDRD: 61 ML/MIN/1.73
GLOBULIN UR ELPH-MCNC: 3.1 GM/DL
GLUCOSE BLD-MCNC: 94 MG/DL (ref 65–99)
GLUCOSE UR STRIP-MCNC: NEGATIVE MG/DL
HCT VFR BLD AUTO: 41.1 % (ref 34–46.6)
HDLC SERPL-MCNC: 92 MG/DL (ref 40–60)
HGB BLD-MCNC: 13.7 G/DL (ref 12–15.9)
HGB UR QL STRIP.AUTO: NEGATIVE
IMM GRANULOCYTES # BLD AUTO: 0 10*3/MM3 (ref 0–0.05)
IMM GRANULOCYTES NFR BLD AUTO: 0 % (ref 0–0.5)
KETONES UR QL STRIP: ABNORMAL
LDLC SERPL CALC-MCNC: 76 MG/DL (ref 0–100)
LDLC/HDLC SERPL: 0.82 {RATIO}
LEUKOCYTE ESTERASE UR QL STRIP.AUTO: NEGATIVE
LYMPHOCYTES # BLD AUTO: 1.35 10*3/MM3 (ref 0.7–3.1)
LYMPHOCYTES NFR BLD AUTO: 31.3 % (ref 19.6–45.3)
MCH RBC QN AUTO: 31 PG (ref 26.6–33)
MCHC RBC AUTO-ENTMCNC: 33.3 G/DL (ref 31.5–35.7)
MCV RBC AUTO: 93 FL (ref 79–97)
MONOCYTES # BLD AUTO: 0.39 10*3/MM3 (ref 0.1–0.9)
MONOCYTES NFR BLD AUTO: 9 % (ref 5–12)
NEUTROPHILS # BLD AUTO: 2.52 10*3/MM3 (ref 1.7–7)
NEUTROPHILS NFR BLD AUTO: 58.3 % (ref 42.7–76)
NITRITE UR QL STRIP: NEGATIVE
NRBC BLD AUTO-RTO: 0 /100 WBC (ref 0–0.2)
PH UR STRIP.AUTO: 5.5 [PH] (ref 5–8)
PLATELET # BLD AUTO: 195 10*3/MM3 (ref 140–450)
PMV BLD AUTO: 11.9 FL (ref 6–12)
POTASSIUM BLD-SCNC: 4 MMOL/L (ref 3.5–5.2)
PROT SERPL-MCNC: 7.5 G/DL (ref 6–8.5)
PROT UR QL STRIP: NEGATIVE
RBC # BLD AUTO: 4.42 10*6/MM3 (ref 3.77–5.28)
SODIUM BLD-SCNC: 139 MMOL/L (ref 136–145)
SP GR UR STRIP: 1.01 (ref 1–1.03)
TRIGL SERPL-MCNC: 57 MG/DL (ref 0–150)
TSH SERPL DL<=0.05 MIU/L-ACNC: 1.38 UIU/ML (ref 0.27–4.2)
UROBILINOGEN UR QL STRIP: ABNORMAL
VLDLC SERPL-MCNC: 11.4 MG/DL (ref 5–40)
WBC NRBC COR # BLD: 4.32 10*3/MM3 (ref 3.4–10.8)

## 2020-06-10 PROCEDURE — 81003 URINALYSIS AUTO W/O SCOPE: CPT

## 2020-06-10 PROCEDURE — 85025 COMPLETE CBC W/AUTO DIFF WBC: CPT

## 2020-06-10 PROCEDURE — 80053 COMPREHEN METABOLIC PANEL: CPT

## 2020-06-10 PROCEDURE — 80061 LIPID PANEL: CPT

## 2020-06-10 PROCEDURE — G0439 PPPS, SUBSEQ VISIT: HCPCS | Performed by: PHYSICIAN ASSISTANT

## 2020-06-10 PROCEDURE — 84443 ASSAY THYROID STIM HORMONE: CPT

## 2020-06-10 PROCEDURE — 82306 VITAMIN D 25 HYDROXY: CPT

## 2020-06-10 NOTE — PROGRESS NOTES
QUICK REFERENCE INFORMATION:  The ABCs of the Annual Wellness Visit    Subsequent Medicare Wellness Visit    HEALTH RISK ASSESSMENT    1935    Recent Hospitalizations:  No hospitalization(s) within the last year..        Current Medical Providers:  Patient Care Team:  Brisa Lord MD as PCP - General  Brisa Lord MD as PCP - Family Medicine  Brandy Hughes MD as PCP - Claims Attributed        Smoking Status:  Social History     Tobacco Use   Smoking Status Never Smoker   Smokeless Tobacco Never Used       Alcohol Consumption:  Social History     Substance and Sexual Activity   Alcohol Use Yes   • Frequency: 4 or more times a week   • Drinks per session: 1 or 2       Depression Screen:   PHQ-2/PHQ-9 Depression Screening 6/10/2020   Little interest or pleasure in doing things 0   Feeling down, depressed, or hopeless 0   Total Score 0       Health Habits and Functional and Cognitive Screening:  Functional & Cognitive Status 6/10/2020   Do you have difficulty preparing food and eating? No   Do you have difficulty bathing yourself, getting dressed or grooming yourself? No   Do you have difficulty using the toilet? No   Do you have difficulty moving around from place to place? No   Do you have trouble with steps or getting out of a bed or a chair? No   Current Diet Well Balanced Diet   Dental Exam Up to date   Eye Exam Up to date   Exercise (times per week) 5 times per week   Current Exercise Activities Include Walking   Do you need help using the phone?  No   Are you deaf or do you have serious difficulty hearing?  No   Do you need help with transportation? No   Do you need help shopping? No   Do you need help preparing meals?  No   Do you need help with housework?  No   Do you need help with laundry? No   Do you need help taking your medications? No   Do you need help managing money? No   Do you ever drive or ride in a car without wearing a seat belt? No   Have you felt unusual stress, anger or  loneliness in the last month? No   Who do you live with? Spouse   If you need help, do you have trouble finding someone available to you? No   Have you been bothered in the last four weeks by sexual problems? No   Do you have difficulty concentrating, remembering or making decisions? No       Fall Risk Screen:  HARLAN Fall Risk Assessment was completed, and patient is at LOW risk for falls.Assessment completed on:6/10/2020    ACE III MINI   ATTENTION  What is the year: correct  What is the month of the year: correct  What is the day of the week?: correct  What is the date?: correct  MEMORY  Repeat address three times, only score third attempt: [address], [place]: 7  HOW MANY ANIMALS DID THE PATIENT NAME  Verbal Fluency -- Animal Names (0-25): 22+  CLOCK DRAWING  Clock Drawing: All Correct  MEMORY RECALL  Tell me what you remember about that name and address we were repeating at the beginnin  ACE TOTAL SCORE  Total ACE Score - <25/30 strongly suggests cognitive impairment; <21/30 almost certainly shows dementia: 30    Does the patient have evidence of cognitive impairment? No    Aspirin use counseling: Taking ASA appropriately as indicated    Recent Lab Results:  CMP:  Lab Results   Component Value Date    BUN 11 2019    CREATININE 0.84 2019    EGFRIFNONA 65 2019    BCR 13.1 2019     2019    K 3.8 2019    CO2 26.8 2019    CALCIUM 9.2 2019    ALBUMIN 4.20 2019    BILITOT 0.6 2019    ALKPHOS 71 2019    AST 28 2019    ALT 14 2019     HbA1c:  No results found for: HGBA1C  Microalbumin:  Lab Results   Component Value Date    MICROALBUR <1.2 2019     Lipid Panel  Lab Results   Component Value Date    CHOL 175 2019    TRIG 72 2019    HDL 90 (H) 2019    LDL 71 2019    AST 28 2019    ALT 14 2019       Visual Acuity:  No exam data present    Age-appropriate Screening  Schedule:  Refer to the list below for future screening recommendations based on patient's age, sex and/or medical conditions. Orders for these recommended tests are listed in the plan section. The patient has been provided with a written plan.    Health Maintenance   Topic Date Due   • TDAP/TD VACCINES (1 - Tdap) 06/05/1946   • DXA SCAN  04/27/2019   • LIPID PANEL  05/06/2020   • MAMMOGRAM  06/04/2020   • INFLUENZA VACCINE  08/01/2020   • ZOSTER VACCINE  Completed        Subjective   History of Present Illness    Kamille Miles is a 85 y.o. female who presents for a Subsequent Wellness Visit. She is current on age recommended guidelines for screening. Due for fasting labs today. Current on immunizations      CHRONIC CONDITIONS    The following portions of the patient's history were reviewed and updated as appropriate: allergies, current medications, past family history, past medical history, past social history, past surgical history and problem list.    Outpatient Medications Prior to Visit   Medication Sig Dispense Refill   • Ascorbic Acid (VITAMIN C PO) Take  by mouth Daily.     • aspirin 81 MG tablet Take 1 tablet by mouth Daily.     • Coenzyme Q10 (CO Q-10) 100 MG capsule Take 1 capsule by mouth Daily.     • hydrocortisone 1 % cream Apply  topically to the appropriate area as directed 2 (Two) Times a Day As Needed for Rash (itching). 1 each 0   • Omega-3 Fatty Acids (FISH OIL) 1000 MG capsule capsule Take 1 capsule by mouth Daily.     • Pediatric Multiple Vitamins (CHEWABLE MULTIPLE VITAMINS PO) Take 1 tablet by mouth Daily.     • Probiotic Product (TRUBIOTICS PO) Take 1 capsule by mouth Daily. Taking QOD     • simvastatin (ZOCOR) 10 MG tablet TAKE 1 TABLET BY MOUTH EVERY EVENING 90 tablet 1   • zolpidem (AMBIEN) 10 MG tablet Take 1 tablet by mouth At Night As Needed for Sleep. (Patient taking differently: Take 10 mg by mouth At Night As Needed for Sleep. Taking 1/2 or a 1/3 every 2-3 nights) 30 tablet 0   •  "amitriptyline (ELAVIL) 25 MG tablet Take 1 tablet by mouth Daily.  1     No facility-administered medications prior to visit.        Patient Active Problem List   Diagnosis   • Urinary frequency   • Light-headed feeling   • Elevated blood pressure reading   • Mixed hyperlipidemia   • Senile osteoporosis   • Primary insomnia   • Nocturia   • Status post carpal tunnel release   • Allergic rhinitis   • Ankle edema, bilateral   • External hemorrhoid   • Osteoarthritis       Advance Care Planning:  ACP discussion was held with the patient during this visit. Patient has an advance directive (not in EMR), copy requested.    Identification of Risk Factors:  Risk factors include: Advance Directive Discussion.    Review of Systems   Constitutional: Negative for chills, fatigue and fever.   HENT: Negative for congestion, ear pain and sinus pressure.    Respiratory: Negative for cough, chest tightness, shortness of breath and wheezing.    Cardiovascular: Negative for chest pain and palpitations.   Gastrointestinal: Negative for abdominal pain, blood in stool and constipation.   Skin: Positive for color change.        bruise   Allergic/Immunologic: Negative for environmental allergies.   Neurological: Negative for dizziness, speech difficulty and headaches.   Psychiatric/Behavioral: Negative for confusion. The patient is not nervous/anxious.        Compared to one year ago, the patient feels her physical health is the same.  Compared to one year ago, the patient feels her mental health is worse.    Objective     Physical Exam     Procedures     Vitals:    06/10/20 1034 06/10/20 1122   BP: 151/80 142/78   BP Location: Left arm    Patient Position: Sitting    Cuff Size: Adult    Pulse: 76    Temp: 98.3 °F (36.8 °C)    TempSrc: Temporal    Weight: 57.2 kg (126 lb)    Height: 167.6 cm (66\")    PainSc: 0-No pain        Patient's Body mass index is 20.34 kg/m². BMI is within normal parameters. No follow-up " required..      Assessment/Plan   Problem List Items Addressed This Visit        Cardiovascular and Mediastinum    Elevated blood pressure reading - Primary    Current Assessment & Plan     Cut back on sodium.  Increase water intake.  We will recheck at next appt.         Mixed hyperlipidemia    Current Assessment & Plan     Lipid abnormalities are improving with treatment.  Pharmacotherapy as ordered.  Lipids will be reassessed in 6 months.         Relevant Medications    simvastatin (ZOCOR) 10 MG tablet       Other    Primary insomnia    Current Assessment & Plan     1/2 ambien as needed.         Relevant Medications    zolpidem (AMBIEN) 10 MG tablet        Patient Self-Management and Personalized Health Advice  The patient has been provided with information about: exercise and preventive services including:   · Annual Wellness Visit (AWV).    Outpatient Encounter Medications as of 6/10/2020   Medication Sig Dispense Refill   • Ascorbic Acid (VITAMIN C PO) Take  by mouth Daily.     • aspirin 81 MG tablet Take 1 tablet by mouth Daily.     • Coenzyme Q10 (CO Q-10) 100 MG capsule Take 1 capsule by mouth Daily.     • hydrocortisone 1 % cream Apply  topically to the appropriate area as directed 2 (Two) Times a Day As Needed for Rash (itching). 1 each 0   • Omega-3 Fatty Acids (FISH OIL) 1000 MG capsule capsule Take 1 capsule by mouth Daily.     • Pediatric Multiple Vitamins (CHEWABLE MULTIPLE VITAMINS PO) Take 1 tablet by mouth Daily.     • Probiotic Product (TRUBIOTICS PO) Take 1 capsule by mouth Daily. Taking QOD     • simvastatin (ZOCOR) 10 MG tablet TAKE 1 TABLET BY MOUTH EVERY EVENING 90 tablet 1   • zolpidem (AMBIEN) 10 MG tablet Take 1 tablet by mouth At Night As Needed for Sleep. (Patient taking differently: Take 10 mg by mouth At Night As Needed for Sleep. Taking 1/2 or a 1/3 every 2-3 nights) 30 tablet 0   • [DISCONTINUED] amitriptyline (ELAVIL) 25 MG tablet Take 1 tablet by mouth Daily.  1     No  facility-administered encounter medications on file as of 6/10/2020.        Reviewed use of high risk medication in the elderly: yes  Reviewed for potential of harmful drug interactions in the elderly: yes    Follow Up:  Return for Annual physical.     Patient Instructions     Advance Directive    Advance directives are legal documents that let you make choices ahead of time about your health care and medical treatment in case you become unable to communicate for yourself. Advance directives are a way for you to communicate your wishes to family, friends, and health care providers. This can help convey your decisions about end-of-life care if you become unable to communicate.  Discussing and writing advance directives should happen over time rather than all at once. Advance directives can be changed depending on your situation and what you want, even after you have signed the advance directives.  If you do not have an advance directive, some states assign family decision makers to act on your behalf based on how closely you are related to them. Each state has its own laws regarding advance directives. You may want to check with your health care provider, , or state representative about the laws in your state. There are different types of advance directives, such as:  · Medical power of .  · Living will.  · Do not resuscitate (DNR) or do not attempt resuscitation (DNAR) order.  Health care proxy and medical power of   A health care proxy, also called a health care agent, is a person who is appointed to make medical decisions for you in cases in which you are unable to make the decisions yourself. Generally, people choose someone they know well and trust to represent their preferences. Make sure to ask this person for an agreement to act as your proxy. A proxy may have to exercise judgment in the event of a medical decision for which your wishes are not known.  A medical power of  is a  legal document that names your health care proxy. Depending on the laws in your state, after the document is written, it may also need to be:  · Signed.  · Notarized.  · Dated.  · Copied.  · Witnessed.  · Incorporated into your medical record.  You may also want to appoint someone to manage your financial affairs in a situation in which you are unable to do so. This is called a durable power of  for finances. It is a separate legal document from the durable power of  for health care. You may choose the same person or someone different from your health care proxy to act as your agent in financial matters.  If you do not appoint a proxy, or if there is a concern that the proxy is not acting in your best interests, a court-appointed guardian may be designated to act on your behalf.  Living will  A living will is a set of instructions documenting your wishes about medical care when you cannot express them yourself. Health care providers should keep a copy of your living will in your medical record. You may want to give a copy to family members or friends. To alert caregivers in case of an emergency, you can place a card in your wallet to let them know that you have a living will and where they can find it. A living will is used if you become:  · Terminally ill.  · Incapacitated.  · Unable to communicate or make decisions.  Items to consider in your living will include:  · The use or non-use of life-sustaining equipment, such as dialysis machines and breathing machines (ventilators).  · A DNR or DNAR order, which is the instruction not to use cardiopulmonary resuscitation (CPR) if breathing or heartbeat stops.  · The use or non-use of tube feeding.  · Withholding of food and fluids.  · Comfort (palliative) care when the goal becomes comfort rather than a cure.  · Organ and tissue donation.  A living will does not give instructions for distributing your money and property if you should pass away. It is  recommended that you seek the advice of a  when writing a will. Decisions about taxes, beneficiaries, and asset distribution will be legally binding. This process can relieve your family and friends of any concerns surrounding disputes or questions that may come up about the distribution of your assets.  DNR or DNAR  A DNR or DNAR order is a request not to have CPR in the event that your heart stops beating or you stop breathing. If a DNR or DNAR order has not been made and shared, a health care provider will try to help any patient whose heart has stopped or who has stopped breathing. If you plan to have surgery, talk with your health care provider about how your DNR or DNAR order will be followed if problems occur.  Summary  · Advance directives are the legal documents that allow you to make choices ahead of time about your health care and medical treatment in case you become unable to communicate for yourself.  · The process of discussing and writing advance directives should happen over time. You can change the advance directives, even after you have signed them.  · Advance directives include DNR or DNAR orders, living low, and designating an agent as your medical power of .  This information is not intended to replace advice given to you by your health care provider. Make sure you discuss any questions you have with your health care provider.  Document Released: 2009 Document Revised: 2020 Document Reviewed: 2017  [employer] Patient Education ©  [email or web address]    Medicare Wellness  Personal Prevention Plan of Service     Date of Office Visit:  06/10/2020  Encounter Provider:  Devorah Mistry PA-C  Place of Service:  [facility] INTERNAL MEDICINE  Patient Name: Kamille Miles  :  1935    As part of the Medicare Wellness portion of your visit today, we are providing you with this personalized preventive plan of services (PPPS). This plan is based  upon recommendations of the [place] Preventive Services Task Force (USPSTF) and the Advisory Committee on Immunization Practices (ACIP).    This lists the preventive care services that should be considered, and provides dates of when you are due. Items listed as completed are up-to-date and do not require any further intervention.    Health Maintenance   Topic Date Due   • TDAP/TD VACCINES (1 - Tdap) 06/05/1946   • DXA SCAN  04/27/2019   • MEDICARE ANNUAL WELLNESS  05/06/2020   • LIPID PANEL  05/06/2020   • MAMMOGRAM  06/04/2020   • INFLUENZA VACCINE  08/01/2020   • Pneumococcal Vaccine Once at 65 Years Old  Completed   • ZOSTER VACCINE  Completed       No orders of the defined types were placed in this encounter.      Return for Annual physical.            An After Visit Summary and PPPS with all of these plans were given to the patient.

## 2020-06-10 NOTE — PATIENT INSTRUCTIONS
Advance Directive    Advance directives are legal documents that let you make choices ahead of time about your health care and medical treatment in case you become unable to communicate for yourself. Advance directives are a way for you to communicate your wishes to family, friends, and health care providers. This can help convey your decisions about end-of-life care if you become unable to communicate.  Discussing and writing advance directives should happen over time rather than all at once. Advance directives can be changed depending on your situation and what you want, even after you have signed the advance directives.  If you do not have an advance directive, some states assign family decision makers to act on your behalf based on how closely you are related to them. Each state has its own laws regarding advance directives. You may want to check with your health care provider, , or state representative about the laws in your state. There are different types of advance directives, such as:  · Medical power of .  · Living will.  · Do not resuscitate (DNR) or do not attempt resuscitation (DNAR) order.  Health care proxy and medical power of   A health care proxy, also called a health care agent, is a person who is appointed to make medical decisions for you in cases in which you are unable to make the decisions yourself. Generally, people choose someone they know well and trust to represent their preferences. Make sure to ask this person for an agreement to act as your proxy. A proxy may have to exercise judgment in the event of a medical decision for which your wishes are not known.  A medical power of  is a legal document that names your health care proxy. Depending on the laws in your state, after the document is written, it may also need to be:  · Signed.  · Notarized.  · Dated.  · Copied.  · Witnessed.  · Incorporated into your medical record.  You may also want to appoint  someone to manage your financial affairs in a situation in which you are unable to do so. This is called a durable power of  for finances. It is a separate legal document from the durable power of  for health care. You may choose the same person or someone different from your health care proxy to act as your agent in financial matters.  If you do not appoint a proxy, or if there is a concern that the proxy is not acting in your best interests, a court-appointed guardian may be designated to act on your behalf.  Living will  A living will is a set of instructions documenting your wishes about medical care when you cannot express them yourself. Health care providers should keep a copy of your living will in your medical record. You may want to give a copy to family members or friends. To alert caregivers in case of an emergency, you can place a card in your wallet to let them know that you have a living will and where they can find it. A living will is used if you become:  · Terminally ill.  · Incapacitated.  · Unable to communicate or make decisions.  Items to consider in your living will include:  · The use or non-use of life-sustaining equipment, such as dialysis machines and breathing machines (ventilators).  · A DNR or DNAR order, which is the instruction not to use cardiopulmonary resuscitation (CPR) if breathing or heartbeat stops.  · The use or non-use of tube feeding.  · Withholding of food and fluids.  · Comfort (palliative) care when the goal becomes comfort rather than a cure.  · Organ and tissue donation.  A living will does not give instructions for distributing your money and property if you should pass away. It is recommended that you seek the advice of a  when writing a will. Decisions about taxes, beneficiaries, and asset distribution will be legally binding. This process can relieve your family and friends of any concerns surrounding disputes or questions that may come up about  the distribution of your assets.  DNR or DNAR  A DNR or DNAR order is a request not to have CPR in the event that your heart stops beating or you stop breathing. If a DNR or DNAR order has not been made and shared, a health care provider will try to help any patient whose heart has stopped or who has stopped breathing. If you plan to have surgery, talk with your health care provider about how your DNR or DNAR order will be followed if problems occur.  Summary  · Advance directives are the legal documents that allow you to make choices ahead of time about your health care and medical treatment in case you become unable to communicate for yourself.  · The process of discussing and writing advance directives should happen over time. You can change the advance directives, even after you have signed them.  · Advance directives include DNR or DNAR orders, living low, and designating an agent as your medical power of .  This information is not intended to replace advice given to you by your health care provider. Make sure you discuss any questions you have with your health care provider.  Document Released: 2009 Document Revised: 2020 Document Reviewed: 2017  [employer] Patient Education ©  [employer]    Medicare Wellness  Personal Prevention Plan of Service     Date of Office Visit:  06/10/2020  Encounter Provider:  Devorah Mistry PA-C  Place of Service:  [facility] INTER[facility] MEDICINE  Patient Name: Kamille Miles  :  1935    As part of the Medicare Wellness portion of your visit today, we are providing you with this personalized preventive plan of services (PPPS). This plan is based upon recommendations of the [place] Preventive Services Task Force (USPSTF) and the Advisory Committee on Immunization Practices (ACIP).    This lists the preventive care services that should be considered, and provides dates of when you are due. Items listed as completed  are up-to-date and do not require any further intervention.    Health Maintenance   Topic Date Due   • TDAP/TD VACCINES (1 - Tdap) 06/05/1946   • DXA SCAN  04/27/2019   • MEDICARE ANNUAL WELLNESS  05/06/2020   • LIPID PANEL  05/06/2020   • MAMMOGRAM  06/04/2020   • INFLUENZA VACCINE  08/01/2020   • Pneumococcal Vaccine Once at 65 Years Old  Completed   • ZOSTER VACCINE  Completed       No orders of the defined types were placed in this encounter.      Return for Annual physical.

## 2020-06-11 LAB — 25(OH)D3 SERPL-MCNC: 45.8 NG/ML (ref 30–100)

## 2020-06-23 ENCOUNTER — OFFICE VISIT (OUTPATIENT)
Dept: INTERNAL MEDICINE | Facility: CLINIC | Age: 85
End: 2020-06-23

## 2020-06-23 VITALS
HEIGHT: 66 IN | DIASTOLIC BLOOD PRESSURE: 84 MMHG | TEMPERATURE: 98.6 F | SYSTOLIC BLOOD PRESSURE: 142 MMHG | BODY MASS INDEX: 19.93 KG/M2 | WEIGHT: 124 LBS | HEART RATE: 74 BPM

## 2020-06-23 DIAGNOSIS — J30.2 SEASONAL ALLERGIC RHINITIS, UNSPECIFIED TRIGGER: ICD-10-CM

## 2020-06-23 DIAGNOSIS — M25.471 ANKLE EDEMA, BILATERAL: ICD-10-CM

## 2020-06-23 DIAGNOSIS — R35.1 NOCTURIA: ICD-10-CM

## 2020-06-23 DIAGNOSIS — M25.472 ANKLE EDEMA, BILATERAL: ICD-10-CM

## 2020-06-23 DIAGNOSIS — R51.9 SINUS HEADACHE: Primary | ICD-10-CM

## 2020-06-23 DIAGNOSIS — F51.01 PRIMARY INSOMNIA: ICD-10-CM

## 2020-06-23 DIAGNOSIS — R03.0 ELEVATED BLOOD PRESSURE READING: ICD-10-CM

## 2020-06-23 DIAGNOSIS — E78.2 MIXED HYPERLIPIDEMIA: ICD-10-CM

## 2020-06-23 DIAGNOSIS — M81.0 SENILE OSTEOPOROSIS: ICD-10-CM

## 2020-06-23 PROCEDURE — 99214 OFFICE O/P EST MOD 30 MIN: CPT | Performed by: INTERNAL MEDICINE

## 2020-06-23 PROCEDURE — 93000 ELECTROCARDIOGRAM COMPLETE: CPT | Performed by: INTERNAL MEDICINE

## 2020-06-23 RX ORDER — AZELASTINE HYDROCHLORIDE 137 UG/1
2 SPRAY, METERED NASAL 2 TIMES DAILY
Qty: 1 BOTTLE | Refills: 5 | Status: SHIPPED | OUTPATIENT
Start: 2020-06-23 | End: 2022-06-16

## 2020-06-23 NOTE — PROGRESS NOTES
[facility]     Kamille Miles  1935   [id number]      Patient Care Team:  Brisa Lord MD as PCP - General  Brisa Lord MD as PCP - Family Medicine  Brandy Hughes MD as PCP - Claims Attributed    Chief Complaint   Patient presents with   • Hyperlipidemia     f/u            HPI  Patient is a 85 y.o. female presents with sinus headache . Onset of symptoms was gradual starting a few months ago.  Chronicity acute. Severity mild to moderate.  Symptoms are associated with sinus congestion. Pertinent negatives no ear pain or congestion, post nasal drainage and dry throat, cough.   Symptoms are aggravated by  Weather changes.   Symptoms improve with tried flonase and it helped a little. and zyrtec,but no help with it.  Context denies recent viral infectiins.        CHRONIC CONDITIONS  Nocturia,about 3 times a night. She does go back to sleep.     For insomnia, she takes a little piece of ambien a couple nights a week. She denies side effects.    Takes simvastatin every evening and eats low fat diet.  Walking regularly.    She had a DEXA scan this past year in [place].  She was told that the numbers were stable since the time before.  She does do weightbearing exercises and takes calcium and vitamin D.    She takes a small dose of Ambien for sleep.  It works well for her.  She only takes it a couple times a week.  No side effects.  She is aware of possible side effects.    Past Medical History:   Diagnosis Date   • Headache     cervicogenic -work-up by neurologist in [place], [place]       History reviewed. No pertinent surgical history.    Family History   Problem Relation Age of Onset   • Arrhythmia Mother         Pacemaker placed   • Lymphoma Father    • Breast cancer Neg Hx    • Ovarian cancer Neg Hx        Social History     Socioeconomic History   • Marital status:      Spouse name: Not on file   • Number of children: Not on file   • Years of education: Not on file  "  • Highest education level: Not on file   Tobacco Use   • Smoking status: Never Smoker   • Smokeless tobacco: Never Used   Substance and Sexual Activity   • Alcohol use: Yes     Frequency: 4 or more times a week     Drinks per session: 1 or 2   • Drug use: No   • Sexual activity: Defer       No Known Allergies    Review of Systems:     Review of Systems   Constitutional: Negative for chills, fatigue and fever.   HENT: Positive for congestion, postnasal drip, rhinorrhea and sinus pressure. Negative for ear pain, sore throat, swollen glands and voice change.    Eyes: Negative for visual disturbance.   Respiratory: Negative for cough, shortness of breath and wheezing.    Cardiovascular: Positive for leg swelling. Negative for chest pain and palpitations.   Gastrointestinal: Negative for abdominal pain, blood in stool, constipation and diarrhea.   Endocrine: Negative for cold intolerance and heat intolerance.   Genitourinary: Positive for frequency and urgency. Negative for dysuria.        Nocturia   Musculoskeletal: Positive for arthralgias. Negative for gait problem.   Skin: Negative for rash and skin lesions.   Allergic/Immunologic: Positive for environmental allergies. Negative for food allergies.   Neurological: Positive for headache. Negative for dizziness and memory problem.   Hematological: Negative for adenopathy. Does not bruise/bleed easily.   Psychiatric/Behavioral: Positive for sleep disturbance. Negative for suicidal ideas and depressed mood. The patient is not nervous/anxious.        Vital Signs  Vitals:    06/23/20 1541   BP: 142/84   BP Location: Left arm   Patient Position: Sitting   Cuff Size: Adult   Pulse: 74   Temp: 98.6 °F (37 °C)   TempSrc: Infrared   Weight: 56.2 kg (124 lb)   Height: 167.6 cm (66\")   PainSc: 0-No pain     Body mass index is 20.01 kg/m².      Current Outpatient Medications:   •  Ascorbic Acid (VITAMIN C PO), Take  by mouth Daily., Disp: , Rfl:   •  aspirin 81 MG tablet, Take 1 " tablet by mouth Daily., Disp: , Rfl:   •  Coenzyme Q10 (CO Q-10) 100 MG capsule, Take 1 capsule by mouth Daily., Disp: , Rfl:   •  hydrocortisone 1 % cream, Apply  topically to the appropriate area as directed 2 (Two) Times a Day As Needed for Rash (itching)., Disp: 1 each, Rfl: 0  •  Omega-3 Fatty Acids (FISH OIL) 1000 MG capsule capsule, Take 1 capsule by mouth Daily., Disp: , Rfl:   •  Pediatric Multiple Vitamins (CHEWABLE MULTIPLE VITAMINS PO), Take 1 tablet by mouth Daily., Disp: , Rfl:   •  Probiotic Product (TRUBIOTICS PO), Take 1 capsule by mouth Daily. Taking QOD, Disp: , Rfl:   •  simvastatin (ZOCOR) 10 MG tablet, TAKE 1 TABLET BY MOUTH EVERY EVENING, Disp: 90 tablet, Rfl: 1  •  zolpidem (AMBIEN) 10 MG tablet, Take 1 tablet by mouth At Night As Needed for Sleep. (Patient taking differently: Take 10 mg by mouth At Night As Needed for Sleep. Taking 1/2 or a 1/3 every 2-3 nights), Disp: 30 tablet, Rfl: 0  •  Azelastine HCl 137 MCG/SPRAY solution, 2 sprays into the nostril(s) as directed by provider 2 (two) times a day., Disp: 1 bottle, Rfl: 5  •  Mirabegron ER (Myrbetriq) 25 MG tablet sustained-release 24 hour 24 hr tablet, Take 1 tablet by mouth Daily., Disp: 30 tablet, Rfl: 5    Physical Exam:    Physical Exam   Constitutional: She is oriented to person, place, and time. She appears well-developed and well-nourished.   Eyes: Pupils are equal, round, and reactive to light. Conjunctivae and EOM are normal.   Neck: Normal range of motion. Neck supple. No thyromegaly present.   Cardiovascular: Normal rate, regular rhythm, normal heart sounds and intact distal pulses.   No murmur heard.  Pulmonary/Chest: Effort normal and breath sounds normal. She has no wheezes. Right breast exhibits no inverted nipple, no mass, no nipple discharge, no skin change and no tenderness. Left breast exhibits no inverted nipple, no mass, no nipple discharge, no skin change and no tenderness.   Abdominal: Soft. Bowel sounds are  normal. She exhibits no distension and no mass. There is no tenderness.   Musculoskeletal: Normal range of motion. She exhibits no edema or tenderness.        Right hand: She exhibits deformity. She exhibits no tenderness.        Left hand: She exhibits deformity. She exhibits no tenderness.   Primary osteoarthritis of hands.   Lymphadenopathy:     She has no cervical adenopathy.     She has no axillary adenopathy.   Neurological: She is alert and oriented to person, place, and time. She has normal strength. No cranial nerve deficit or sensory deficit. Coordination and gait normal.   Skin: Skin is warm and dry. No rash noted.   Psychiatric: She has a normal mood and affect. Her speech is normal and behavior is normal. Judgment and thought content normal. Cognition and memory are normal.   Nursing note and vitals reviewed.        ECG 12 Lead  Date/Time: 6/23/2020 5:15 PM  Performed by: Brisa Lord MD  Authorized by: Brisa Lord MD   Comparison: compared with previous ECG   Similar to previous ECG  Rhythm: sinus rhythm  Ectopy: atrial premature contractions  Rate: normal  BPM: 73  Conduction: conduction normal  ST Segments: ST segments normal  T Waves: T waves normal  QRS axis: normal    Clinical impression: normal ECG             ACE III MINI        Results Review:    I reviewed the patient's new clinical results.We reviewed her labs in detail.    CMP:  Lab Results   Component Value Date    BUN 13 06/10/2020    CREATININE 0.89 06/10/2020    EGFRIFNONA 61 06/10/2020    BCR 14.6 06/10/2020     06/10/2020    K 4.0 06/10/2020    CO2 28.6 06/10/2020    CALCIUM 9.8 06/10/2020    ALBUMIN 4.40 06/10/2020    BILITOT 0.6 06/10/2020    ALKPHOS 64 06/10/2020    AST 33 (H) 06/10/2020    ALT 15 06/10/2020     HbA1c:  No results found for: HGBA1C  Microalbumin:  Lab Results   Component Value Date    MICROALBUR <1.2 05/06/2019     Lipid Panel  Lab Results   Component Value Date    CHOL 179 06/10/2020    TRIG 57  06/10/2020    HDL 92 (H) 06/10/2020    LDL 76 06/10/2020    AST 33 (H) 06/10/2020    ALT 15 06/10/2020       Medication Review: Medications reviewed and noted  Patient Instructions   Problem List Items Addressed This Visit        Cardiovascular and Mediastinum    Elevated blood pressure reading    Overview     6/23/2020 Brisa Lord MD    Monitor blood pressures at home.  Let us know if it is running more than low 130s over low 80s.  Continue a low-salt diet.  Continue regular exercise.         Relevant Orders    ECG 12 Lead    Mixed hyperlipidemia    Overview     6/23/2020 Brisa Lord MD    Continue simvastatin every evening.  Continue low-fat diet and regular exercise.         Relevant Medications    simvastatin (ZOCOR) 10 MG tablet       Respiratory    Allergic rhinitis    Overview     6/23/2020 Brisa Lord MD    Use Azelastine nasal spray twice a day.  May also take Mucinex tablet twice a day as needed for congestion and headache.           Relevant Medications    Azelastine HCl 137 MCG/SPRAY solution    Sinus headache - Primary    Overview     6/23/2020 Brisa Lord MD    Use Azelastine nasal spray twice a day.  May also take Mucinex tablet twice a day as needed for congestion and headache.            Musculoskeletal and Integument    Senile osteoporosis    Overview     6/23/2020 Brisa Lord MD    Patient recently had a DEXA scan and was told it was stable compared to the previous 1.    Do some weight bearing exercises every day. Continue calcium and vitamin D.            Genitourinary    Nocturia    Overview     6/23/2020 Brisa Lord MD    Try Myrbetriq again.  1 tablet daily.  Drink most of your daily fluids early in the day and drink less close to bedtime.         Relevant Medications    Mirabegron ER (Myrbetriq) 25 MG tablet sustained-release 24 hour 24 hr tablet       Other    Primary insomnia    Overview     6/23/2020 Brisa Lord MD    Continue low dose ambien as  needed.     We discussed sleep hygiene including going to bed at the same time and getting up at the same time every day, going to bed early enough to get 7 or 8 hours in bed, reading and relaxing before bedtime, and avoiding the TV, computer, phone, iPad close to bedtime.           Relevant Medications    zolpidem (AMBIEN) 10 MG tablet    Ankle edema, bilateral    Overview     6/23/2020 Brisa Lord MD    Elevate feet at home, avoid salt in diet, and drink a lot of water. May wear support hose to the knee.                   Diagnosis Plan   1. Sinus headache     2. Seasonal allergic rhinitis, unspecified trigger  Azelastine HCl 137 MCG/SPRAY solution   3. Elevated blood pressure reading  ECG 12 Lead   4. Nocturia  Mirabegron ER (Myrbetriq) 25 MG tablet sustained-release 24 hour 24 hr tablet   5. Mixed hyperlipidemia     6. Senile osteoporosis     7. Primary insomnia     8. Ankle edema, bilateral         Plan of care reviewed with patient at the conclusion of today's visit. Education was provided regarding diagnosis, management, and any prescribed or recommended OTC medications.Patient verbalizes understanding of and agreement with management plan.         Brisa Lord MD

## 2020-06-24 NOTE — PATIENT INSTRUCTIONS
Patient Instructions   Problem List Items Addressed This Visit        Cardiovascular and Mediastinum    Elevated blood pressure reading    Overview     6/23/2020 Brisa Lord MD    Monitor blood pressures at home.  Let us know if it is running more than low 130s over low 80s.  Continue a low-salt diet.  Continue regular exercise.         Relevant Orders    ECG 12 Lead    Mixed hyperlipidemia    Overview     6/23/2020 Brisa Lord MD    Continue simvastatin every evening.  Continue low-fat diet and regular exercise.         Relevant Medications    simvastatin (ZOCOR) 10 MG tablet       Respiratory    Allergic rhinitis    Overview     6/23/2020 Brisa Lord MD    Use Azelastine nasal spray twice a day.  May also take Mucinex tablet twice a day as needed for congestion and headache.           Relevant Medications    Azelastine HCl 137 MCG/SPRAY solution    Sinus headache - Primary    Overview     6/23/2020 Brisa Lord MD    Use Azelastine nasal spray twice a day.  May also take Mucinex tablet twice a day as needed for congestion and headache.            Musculoskeletal and Integument    Senile osteoporosis    Overview     6/23/2020 Brisa Lord MD    Patient recently had a DEXA scan and was told it was stable compared to the previous 1.    Do some weight bearing exercises every day. Continue calcium and vitamin D.            Genitourinary    Nocturia    Overview     6/23/2020 Brisa Lord MD    Try Myrbetriq again.  1 tablet daily.  Drink most of your daily fluids early in the day and drink less close to bedtime.         Relevant Medications    Mirabegron ER (Myrbetriq) 25 MG tablet sustained-release 24 hour 24 hr tablet       Other    Primary insomnia    Overview     6/23/2020 Brisa Lord MD    Continue low dose ambien as needed.     We discussed sleep hygiene including going to bed at the same time and getting up at the same time every day, going to bed early enough to get 7 or 8  hours in bed, reading and relaxing before bedtime, and avoiding the TV, computer, phone, iPad close to bedtime.           Relevant Medications    zolpidem (AMBIEN) 10 MG tablet    Ankle edema, bilateral    Overview     6/23/2020 Brisa Lord MD    Elevate feet at home, avoid salt in diet, and drink a lot of water. May wear support hose to the knee.

## 2020-06-29 NOTE — PROGRESS NOTES
Called [facility] at [phone]. I spoke with Mariana. She said that patient had the Dexa scan in Jan. 2020. She will fax over report today.

## 2020-09-24 DIAGNOSIS — I10 BENIGN ESSENTIAL HYPERTENSION: Primary | ICD-10-CM

## 2020-09-24 RX ORDER — LISINOPRIL 2.5 MG/1
2.5 TABLET ORAL DAILY
Qty: 30 TABLET | Refills: 5 | Status: SHIPPED | OUTPATIENT
Start: 2020-09-24 | End: 2020-10-13 | Stop reason: SDUPTHER

## 2020-10-13 ENCOUNTER — LAB (OUTPATIENT)
Dept: LAB | Facility: HOSPITAL | Age: 85
End: 2020-10-13

## 2020-10-13 ENCOUNTER — OFFICE VISIT (OUTPATIENT)
Dept: INTERNAL MEDICINE | Facility: CLINIC | Age: 85
End: 2020-10-13

## 2020-10-13 VITALS
WEIGHT: 121.4 LBS | BODY MASS INDEX: 19.51 KG/M2 | SYSTOLIC BLOOD PRESSURE: 130 MMHG | HEIGHT: 66 IN | HEART RATE: 71 BPM | OXYGEN SATURATION: 98 % | TEMPERATURE: 98.6 F | DIASTOLIC BLOOD PRESSURE: 70 MMHG

## 2020-10-13 DIAGNOSIS — R35.0 URINARY FREQUENCY: ICD-10-CM

## 2020-10-13 DIAGNOSIS — I10 BENIGN ESSENTIAL HYPERTENSION: Primary | ICD-10-CM

## 2020-10-13 DIAGNOSIS — E78.2 MIXED HYPERLIPIDEMIA: ICD-10-CM

## 2020-10-13 DIAGNOSIS — R35.1 NOCTURIA: ICD-10-CM

## 2020-10-13 DIAGNOSIS — I10 BENIGN ESSENTIAL HYPERTENSION: ICD-10-CM

## 2020-10-13 DIAGNOSIS — R53.83 OTHER FATIGUE: ICD-10-CM

## 2020-10-13 PROCEDURE — 80048 BASIC METABOLIC PNL TOTAL CA: CPT

## 2020-10-13 PROCEDURE — 99214 OFFICE O/P EST MOD 30 MIN: CPT | Performed by: INTERNAL MEDICINE

## 2020-10-13 RX ORDER — LISINOPRIL 5 MG/1
5 TABLET ORAL DAILY
Qty: 90 TABLET | Refills: 1 | Status: SHIPPED | OUTPATIENT
Start: 2020-10-13 | End: 2021-09-09

## 2020-10-13 NOTE — PROGRESS NOTES
[facility]     Kamille Miles  1935   [id number]      Patient Care Team:  Brisa Lord MD as PCP - General  Brisa Lord MD as PCP - Family Medicine  Brandy Hughes MD as PCP - Claims Attributed    Chief Complaint   Patient presents with   • Hypertension     F/U               HPI  Patient is a 85 y.o. female presents with  Mild Fatigue and mild shortness of breath since on lisinopril for hypertension. Onset of symptoms was since started lisinopril 2.5mg tab daily 9/24/20. Chronicity acute. Severity mild.   Pertinent negatives no cough or wheeze or chest pain.   Symptoms are aggravated by  walking at a good fast pace. .   Symptoms improve with slowing down.  Context  No shortness of breath raking leaves or working around house.  Recent history we increased lisinopril to 5mg daily on 10/5/20 since blood pressures were still running 140s to 160s systolic.    Over last few days BPs usually 140 systolic or less.  Last 2 days running 118-128/70s.    Usually had been doing exercise class 3 d/wk and walking all other days.   No classes since march due to Covid-19 pandemic. Walking less over last few months.   She and her  just resumed walking about 1 1/2 wk ago.    CHRONIC CONDITIONS  For urinary frequency, tried several medications.  myrbetriq didn't help either with nocturia and frequency. So she stopped it. She notices that caffeine makes symptoms worse.    Hyperlipidemia-taking stain .  Eating low-fat diet.    Past Medical History:   Diagnosis Date   • Headache     cervicogenic -work-up by neurologist in [place], [place]       History reviewed. No pertinent surgical history.    Family History   Problem Relation Age of Onset   • Arrhythmia Mother         Pacemaker placed   • Lymphoma Father    • Breast cancer Neg Hx    • Ovarian cancer Neg Hx        Social History     Socioeconomic History   • Marital status:      Spouse name: Not on file   • Number of children:  "Not on file   • Years of education: Not on file   • Highest education level: Not on file   Tobacco Use   • Smoking status: Never Smoker   • Smokeless tobacco: Never Used   Substance and Sexual Activity   • Alcohol use: Yes     Alcohol/week: 1.0 standard drinks     Types: 1 Glasses of wine per week     Frequency: 4 or more times a week     Drinks per session: 1 or 2     Comment: at dinner   • Drug use: No   • Sexual activity: Defer       No Known Allergies    Review of Systems:     Review of Systems   Constitutional: Negative for chills, fatigue and fever.   HENT: Negative for congestion, ear pain and sinus pressure.    Eyes: Negative for blurred vision and visual disturbance.   Respiratory: Positive for shortness of breath. Negative for cough, chest tightness and wheezing.    Cardiovascular: Negative for chest pain, palpitations and leg swelling.   Gastrointestinal: Negative for abdominal pain, blood in stool, constipation and GERD.   Genitourinary: Positive for frequency. Negative for dysuria.        Nocturia   Musculoskeletal: Negative for neck pain.   Skin: Negative for color change.   Allergic/Immunologic: Negative for environmental allergies.   Neurological: Negative for dizziness, speech difficulty, weakness, numbness and headache.   Psychiatric/Behavioral: Negative for decreased concentration. The patient is not nervous/anxious.        Vital Signs  Vitals:    10/13/20 1444   BP: 130/70   BP Location: Left arm   Patient Position: Sitting   Cuff Size: Adult   Pulse: 71   Temp: 98.6 °F (37 °C)   TempSrc: Temporal   SpO2: 98%   Weight: 55.1 kg (121 lb 6.4 oz)   Height: 167.6 cm (65.98\")   PainSc: 0-No pain     Body mass index is 19.6 kg/m².      Current Outpatient Medications:   •  Ascorbic Acid (VITAMIN C PO), Take  by mouth Daily., Disp: , Rfl:   •  aspirin 81 MG tablet, Take 1 tablet by mouth Daily., Disp: , Rfl:   •  Azelastine HCl 137 MCG/SPRAY solution, 2 sprays into the nostril(s) as directed by provider 2 " (two) times a day. (Patient taking differently: 2 sprays into the nostril(s) as directed by provider 2 (two) times a day. prn), Disp: 1 bottle, Rfl: 5  •  Coenzyme Q10 (CO Q-10) 100 MG capsule, Take 1 capsule by mouth Daily., Disp: , Rfl:   •  hydrocortisone 1 % cream, Apply  topically to the appropriate area as directed 2 (Two) Times a Day As Needed for Rash (itching)., Disp: 1 each, Rfl: 0  •  lisinopril (PRINIVIL,ZESTRIL) 5 MG tablet, Take 1 tablet by mouth Daily., Disp: 90 tablet, Rfl: 1  •  Omega-3 Fatty Acids (FISH OIL) 1000 MG capsule capsule, Take 1 capsule by mouth Daily., Disp: , Rfl:   •  Pediatric Multiple Vitamins (CHEWABLE MULTIPLE VITAMINS PO), Take 1 tablet by mouth Daily., Disp: , Rfl:   •  Probiotic Product (TRUBIOTICS PO), Take 1 capsule by mouth Daily. Taking QOD, Disp: , Rfl:   •  simvastatin (ZOCOR) 10 MG tablet, TAKE 1 TABLET BY MOUTH EVERY EVENING, Disp: 90 tablet, Rfl: 1  •  zolpidem (AMBIEN) 10 MG tablet, Take 1 tablet by mouth At Night As Needed for Sleep. (Patient taking differently: Take 10 mg by mouth At Night As Needed for Sleep. Taking 1/2 or a 1/3 every 2-3 nights), Disp: 30 tablet, Rfl: 0    Physical Exam:    Physical Exam  Vitals signs and nursing note reviewed.   Constitutional:       Appearance: She is well-developed.   HENT:      Head: Normocephalic.   Eyes:      Conjunctiva/sclera: Conjunctivae normal.      Pupils: Pupils are equal, round, and reactive to light.   Neck:      Musculoskeletal: Normal range of motion and neck supple.      Thyroid: No thyromegaly.   Cardiovascular:      Rate and Rhythm: Normal rate and regular rhythm.      Heart sounds: Normal heart sounds.   Pulmonary:      Effort: Pulmonary effort is normal.      Breath sounds: Normal breath sounds. No wheezing.   Musculoskeletal: Normal range of motion.   Lymphadenopathy:      Cervical: No cervical adenopathy.   Skin:     General: Skin is warm and dry.   Neurological:      Mental Status: She is alert and  oriented to person, place, and time.   Psychiatric:         Thought Content: Thought content normal.          ACE III MINI        Results Review:    None    CMP:  Lab Results   Component Value Date    BUN 13 06/10/2020    CREATININE 0.89 06/10/2020    EGFRIFNONA 61 06/10/2020    BCR 14.6 06/10/2020     06/10/2020    K 4.0 06/10/2020    CO2 28.6 06/10/2020    CALCIUM 9.8 06/10/2020    ALBUMIN 4.40 06/10/2020    BILITOT 0.6 06/10/2020    ALKPHOS 64 06/10/2020    AST 33 (H) 06/10/2020    ALT 15 06/10/2020     HbA1c:  No results found for: HGBA1C  Microalbumin:  Lab Results   Component Value Date    MICROALBUR <1.2 05/06/2019     Lipid Panel  Lab Results   Component Value Date    CHOL 179 06/10/2020    TRIG 57 06/10/2020    HDL 92 (H) 06/10/2020    LDL 76 06/10/2020    AST 33 (H) 06/10/2020    ALT 15 06/10/2020       Medication Review: Medications reviewed and noted  Patient Instructions   Problem List Items Addressed This Visit        Cardiovascular and Mediastinum    Benign essential hypertension - Primary (Chronic)    Overview     10/13/2020 Brisa Lord MD    Since the blood pressures are starting to look better the last couple days, continue 5 mg lisinopril daily.    Let us now in a couple weeks how the BP is running. Let us know if you continue to have fatigue.         Relevant Medications    lisinopril (PRINIVIL,ZESTRIL) 5 MG tablet    Other Relevant Orders    Basic Metabolic Panel    Mixed hyperlipidemia    Overview     10/13/2020 Brisa Lord MD    Continue simvastatin every evening.  Continue low-fat diet and regular exercise.         Relevant Medications    simvastatin (ZOCOR) 10 MG tablet       Genitourinary    Urinary frequency    Overview     10/13/2020 Brisa Lord MD    She has tried several different medications, none of which seem to help.    Avoid caffeine and carbonated beverages.    Try to drink a lot of fluids early in the day and less close to bedtime.         Nocturia     Overview     10/13/2020 Brisa Lord MD    She has tried several different medications, none of which seem to help.    Avoid caffeine and carbonated beverages.    Try to drink a lot of fluids early in the day and less close to bedtime.            Other    Other fatigue    Overview     10/13/2020 Brisa Lord MD    Mild fatigue and mild shortness of breath with walking may be due to lisinopril or may be due to deconditioning since she had not been exercising as vigorously or as often over the last 6 months as before Covid-19 pandemic.    Continue lisinopril for now. Gradually increase intensity and frequency of walking and exercise.                   Diagnosis Plan   1. Benign essential hypertension  lisinopril (PRINIVIL,ZESTRIL) 5 MG tablet    Basic Metabolic Panel   2. Other fatigue     3. Urinary frequency     4. Nocturia     5. Mixed hyperlipidemia         Plan of care reviewed with patient at the conclusion of today's visit. Education was provided regarding diagnosis, management, and any prescribed or recommended OTC medications.Patient verbalizes understanding of and agreement with management plan.         Brisa Lord MD        Answers for HPI/ROS submitted by the patient on 10/11/2020   Hypertension  What is the primary reason for your visit?: High Blood Pressure  Chronicity: new  Progression since onset: waxing and waning  Condition status: resistant  anxiety: No  headaches: No  malaise/fatigue: No  orthopnea: No  peripheral edema: No  PND: No  sweats: No  Agents associated with hypertension: no associated agents  CAD risks: no known risk factors  Compliance problems: no compliance problems

## 2020-10-13 NOTE — PATIENT INSTRUCTIONS
Patient Instructions   Problem List Items Addressed This Visit        Cardiovascular and Mediastinum    Benign essential hypertension - Primary (Chronic)    Overview     10/13/2020 Brisa Lord MD    Since the blood pressures are starting to look better the last couple days, continue 5 mg lisinopril daily.    Let us now in a couple weeks how the BP is running. Let us know if you continue to have fatigue.         Relevant Medications    lisinopril (PRINIVIL,ZESTRIL) 5 MG tablet    Other Relevant Orders    Basic Metabolic Panel    Mixed hyperlipidemia    Overview     10/13/2020 Brisa Lord MD    Continue simvastatin every evening.  Continue low-fat diet and regular exercise.         Relevant Medications    simvastatin (ZOCOR) 10 MG tablet       Genitourinary    Urinary frequency    Overview     10/13/2020 Brisa Lord MD    She has tried several different medications, none of which seem to help.    Avoid caffeine and carbonated beverages.    Try to drink a lot of fluids early in the day and less close to bedtime.         Nocturia    Overview     10/13/2020 Brisa Lord MD    She has tried several different medications, none of which seem to help.    Avoid caffeine and carbonated beverages.    Try to drink a lot of fluids early in the day and less close to bedtime.            Other    Other fatigue    Overview     10/13/2020 Brisa Lord MD    Mild fatigue and mild shortness of breath with walking may be due to lisinopril or may be due to deconditioning since she had not been exercising as vigorously or as often over the last 6 months as before Covid-19 pandemic.    Continue lisinopril for now. Gradually increase intensity and frequency of walking and exercise.

## 2020-10-14 LAB
ANION GAP SERPL CALCULATED.3IONS-SCNC: 7.3 MMOL/L (ref 5–15)
BUN SERPL-MCNC: 17 MG/DL (ref 8–23)
BUN/CREAT SERPL: 19.1 (ref 7–25)
CALCIUM SPEC-SCNC: 10.1 MG/DL (ref 8.6–10.5)
CHLORIDE SERPL-SCNC: 101 MMOL/L (ref 98–107)
CO2 SERPL-SCNC: 28.7 MMOL/L (ref 22–29)
CREAT SERPL-MCNC: 0.89 MG/DL (ref 0.57–1)
GFR SERPL CREATININE-BSD FRML MDRD: 61 ML/MIN/1.73
GLUCOSE SERPL-MCNC: 91 MG/DL (ref 65–99)
POTASSIUM SERPL-SCNC: 4.1 MMOL/L (ref 3.5–5.2)
SODIUM SERPL-SCNC: 137 MMOL/L (ref 136–145)

## 2020-12-14 ENCOUNTER — PATIENT MESSAGE (OUTPATIENT)
Dept: INTERNAL MEDICINE | Facility: CLINIC | Age: 85
End: 2020-12-14

## 2020-12-14 DIAGNOSIS — F51.01 PRIMARY INSOMNIA: ICD-10-CM

## 2020-12-14 RX ORDER — ZOLPIDEM TARTRATE 10 MG/1
TABLET ORAL
Qty: 30 TABLET | Refills: 2 | Status: SHIPPED | OUTPATIENT
Start: 2020-12-14 | End: 2021-03-23

## 2020-12-14 NOTE — TELEPHONE ENCOUNTER
From: Kamille Miles  To: Devorah Mistry PA-C  Sent: 12/14/2020 8:53 AM EST  Subject: Prescription Question    I had a checkup in June & never renewed  my prescription for Ambien.  I am in [place] and would like to renew it here.  Can that be done since it [place] of state?  If it can, it's [id number] [id number],  [place] [address]...[phone].  Thanks!  Kamille Granados

## 2021-02-02 DIAGNOSIS — E78.2 MIXED HYPERLIPIDEMIA: ICD-10-CM

## 2021-02-03 RX ORDER — SIMVASTATIN 10 MG
10 TABLET ORAL EVERY EVENING
Qty: 90 TABLET | Refills: 1 | Status: SHIPPED | OUTPATIENT
Start: 2021-02-03 | End: 2021-07-28

## 2021-03-22 ENCOUNTER — TELEPHONE (OUTPATIENT)
Dept: INTERNAL MEDICINE | Facility: CLINIC | Age: 86
End: 2021-03-22

## 2021-03-22 NOTE — TELEPHONE ENCOUNTER
Caller: Kamille Miles    Relationship: Self    Best call back number: [phone]    What orders are you requesting (i.e. lab or imaging): LABS     In what timeframe would the patient need to come in: BEFORE THE WELLNESS APPOINTMENT     Where will you receive your lab/imaging services: IN OFFICE

## 2021-03-23 DIAGNOSIS — F51.01 PRIMARY INSOMNIA: Primary | ICD-10-CM

## 2021-03-23 RX ORDER — ZOLPIDEM TARTRATE 6.25 MG/1
6.25 TABLET, FILM COATED, EXTENDED RELEASE ORAL NIGHTLY PRN
Qty: 30 TABLET | Refills: 2 | Status: SHIPPED | OUTPATIENT
Start: 2021-03-23 | End: 2021-08-24

## 2021-05-10 DIAGNOSIS — I10 BENIGN ESSENTIAL HYPERTENSION: Primary | ICD-10-CM

## 2021-06-01 ENCOUNTER — LAB (OUTPATIENT)
Dept: LAB | Facility: HOSPITAL | Age: 86
End: 2021-06-01

## 2021-06-01 ENCOUNTER — HOSPITAL ENCOUNTER (OUTPATIENT)
Dept: GENERAL RADIOLOGY | Facility: HOSPITAL | Age: 86
Discharge: HOME | End: 2021-06-01

## 2021-06-01 DIAGNOSIS — R53.83 OTHER FATIGUE: ICD-10-CM

## 2021-06-01 DIAGNOSIS — I10 BENIGN ESSENTIAL HYPERTENSION: ICD-10-CM

## 2021-06-01 DIAGNOSIS — E78.2 MIXED HYPERLIPIDEMIA: ICD-10-CM

## 2021-06-01 DIAGNOSIS — M81.0 SENILE OSTEOPOROSIS: ICD-10-CM

## 2021-06-01 LAB
25(OH)D3 SERPL-MCNC: 45.2 NG/ML
ALBUMIN SERPL-MCNC: 4.7 G/DL (ref 3.5–5.2)
ALBUMIN UR-MCNC: <1.2 MG/DL
ALBUMIN/GLOB SERPL: 1.4 G/DL
ALP SERPL-CCNC: 70 U/L (ref 39–117)
ALT SERPL W P-5'-P-CCNC: 15 U/L (ref 1–33)
ANION GAP SERPL CALCULATED.3IONS-SCNC: 7 MMOL/L (ref 5–15)
AST SERPL-CCNC: 27 U/L (ref 1–32)
BASOPHILS # BLD AUTO: 0.04 10*3/MM3 (ref 0–0.2)
BASOPHILS NFR BLD AUTO: 0.9 % (ref 0–1.5)
BILIRUB SERPL-MCNC: 0.6 MG/DL (ref 0–1.2)
BILIRUB UR QL STRIP: NEGATIVE
BUN SERPL-MCNC: 15 MG/DL (ref 8–23)
BUN/CREAT SERPL: 17 (ref 7–25)
CALCIUM SPEC-SCNC: 9.8 MG/DL (ref 8.6–10.5)
CHLORIDE SERPL-SCNC: 99 MMOL/L (ref 98–107)
CHOLEST SERPL-MCNC: 212 MG/DL (ref 0–200)
CLARITY UR: CLEAR
CO2 SERPL-SCNC: 32 MMOL/L (ref 22–29)
COLOR UR: YELLOW
CREAT SERPL-MCNC: 0.88 MG/DL (ref 0.57–1)
CREAT UR-MCNC: 77.5 MG/DL
DEPRECATED RDW RBC AUTO: 46.2 FL (ref 37–54)
EOSINOPHIL # BLD AUTO: 0.09 10*3/MM3 (ref 0–0.4)
EOSINOPHIL NFR BLD AUTO: 2 % (ref 0.3–6.2)
ERYTHROCYTE [DISTWIDTH] IN BLOOD BY AUTOMATED COUNT: 13.1 % (ref 12.3–15.4)
GFR SERPL CREATININE-BSD FRML MDRD: 61 ML/MIN/1.73
GLOBULIN UR ELPH-MCNC: 3.3 GM/DL
GLUCOSE SERPL-MCNC: 92 MG/DL (ref 65–99)
GLUCOSE UR STRIP-MCNC: NEGATIVE MG/DL
HCT VFR BLD AUTO: 46.5 % (ref 34–46.6)
HDLC SERPL-MCNC: 89 MG/DL (ref 40–60)
HGB BLD-MCNC: 15.3 G/DL (ref 12–15.9)
HGB UR QL STRIP.AUTO: NEGATIVE
IMM GRANULOCYTES # BLD AUTO: 0.01 10*3/MM3 (ref 0–0.05)
IMM GRANULOCYTES NFR BLD AUTO: 0.2 % (ref 0–0.5)
KETONES UR QL STRIP: NEGATIVE
LDLC SERPL CALC-MCNC: 111 MG/DL (ref 0–100)
LDLC/HDLC SERPL: 1.23 {RATIO}
LEUKOCYTE ESTERASE UR QL STRIP.AUTO: ABNORMAL
LYMPHOCYTES # BLD AUTO: 1.83 10*3/MM3 (ref 0.7–3.1)
LYMPHOCYTES NFR BLD AUTO: 40.8 % (ref 19.6–45.3)
MCH RBC QN AUTO: 31.4 PG (ref 26.6–33)
MCHC RBC AUTO-ENTMCNC: 32.9 G/DL (ref 31.5–35.7)
MCV RBC AUTO: 95.3 FL (ref 79–97)
MICROALBUMIN/CREAT UR: NORMAL MG/G{CREAT}
MONOCYTES # BLD AUTO: 0.49 10*3/MM3 (ref 0.1–0.9)
MONOCYTES NFR BLD AUTO: 10.9 % (ref 5–12)
NEUTROPHILS NFR BLD AUTO: 2.02 10*3/MM3 (ref 1.7–7)
NEUTROPHILS NFR BLD AUTO: 45.2 % (ref 42.7–76)
NITRITE UR QL STRIP: NEGATIVE
NRBC BLD AUTO-RTO: 0 /100 WBC (ref 0–0.2)
PH UR STRIP.AUTO: 7 [PH] (ref 5–8)
PLATELET # BLD AUTO: 209 10*3/MM3 (ref 140–450)
PMV BLD AUTO: 11.9 FL (ref 6–12)
POTASSIUM SERPL-SCNC: 3.7 MMOL/L (ref 3.5–5.2)
PROT SERPL-MCNC: 8 G/DL (ref 6–8.5)
PROT UR QL STRIP: NEGATIVE
RBC # BLD AUTO: 4.88 10*6/MM3 (ref 3.77–5.28)
SODIUM SERPL-SCNC: 138 MMOL/L (ref 136–145)
SP GR UR STRIP: 1.01 (ref 1–1.03)
TRIGL SERPL-MCNC: 69 MG/DL (ref 0–150)
TSH SERPL DL<=0.05 MIU/L-ACNC: 2.14 UIU/ML (ref 0.27–4.2)
UROBILINOGEN UR QL STRIP: ABNORMAL
VIT B12 BLD-MCNC: 433 PG/ML (ref 211–946)
VLDLC SERPL-MCNC: 12 MG/DL (ref 5–40)
WBC # BLD AUTO: 4.48 10*3/MM3 (ref 3.4–10.8)

## 2021-06-01 PROCEDURE — 71046 X-RAY EXAM CHEST 2 VIEWS: CPT

## 2021-06-01 PROCEDURE — 82043 UR ALBUMIN QUANTITATIVE: CPT

## 2021-06-01 PROCEDURE — 80061 LIPID PANEL: CPT

## 2021-06-01 PROCEDURE — 84443 ASSAY THYROID STIM HORMONE: CPT

## 2021-06-01 PROCEDURE — 82306 VITAMIN D 25 HYDROXY: CPT

## 2021-06-01 PROCEDURE — 82607 VITAMIN B-12: CPT

## 2021-06-01 PROCEDURE — 80053 COMPREHEN METABOLIC PANEL: CPT

## 2021-06-01 PROCEDURE — 82570 ASSAY OF URINE CREATININE: CPT

## 2021-06-01 PROCEDURE — 85025 COMPLETE CBC W/AUTO DIFF WBC: CPT

## 2021-06-01 PROCEDURE — 81003 URINALYSIS AUTO W/O SCOPE: CPT

## 2021-06-14 ENCOUNTER — OFFICE VISIT (OUTPATIENT)
Dept: INTERNAL MEDICINE | Facility: CLINIC | Age: 86
End: 2021-06-14

## 2021-06-14 VITALS
HEART RATE: 76 BPM | DIASTOLIC BLOOD PRESSURE: 76 MMHG | HEIGHT: 66 IN | OXYGEN SATURATION: 98 % | WEIGHT: 125.6 LBS | TEMPERATURE: 98.7 F | BODY MASS INDEX: 20.18 KG/M2 | SYSTOLIC BLOOD PRESSURE: 150 MMHG

## 2021-06-14 DIAGNOSIS — M81.0 SENILE OSTEOPOROSIS: ICD-10-CM

## 2021-06-14 DIAGNOSIS — M25.561 CHRONIC PAIN OF RIGHT KNEE: Chronic | ICD-10-CM

## 2021-06-14 DIAGNOSIS — G89.29 CHRONIC PAIN OF RIGHT KNEE: Chronic | ICD-10-CM

## 2021-06-14 DIAGNOSIS — F51.01 PRIMARY INSOMNIA: ICD-10-CM

## 2021-06-14 DIAGNOSIS — R12 HEARTBURN: Chronic | ICD-10-CM

## 2021-06-14 DIAGNOSIS — I10 BENIGN ESSENTIAL HYPERTENSION: Chronic | ICD-10-CM

## 2021-06-14 DIAGNOSIS — H61.23 BILATERAL HEARING LOSS DUE TO CERUMEN IMPACTION: Chronic | ICD-10-CM

## 2021-06-14 DIAGNOSIS — E78.2 MIXED HYPERLIPIDEMIA: ICD-10-CM

## 2021-06-14 DIAGNOSIS — Z00.00 MEDICARE ANNUAL WELLNESS VISIT, SUBSEQUENT: Primary | ICD-10-CM

## 2021-06-14 DIAGNOSIS — R53.83 OTHER FATIGUE: ICD-10-CM

## 2021-06-14 PROCEDURE — 99213 OFFICE O/P EST LOW 20 MIN: CPT | Performed by: INTERNAL MEDICINE

## 2021-06-14 PROCEDURE — 69210 REMOVE IMPACTED EAR WAX UNI: CPT | Performed by: INTERNAL MEDICINE

## 2021-06-14 PROCEDURE — 93005 ELECTROCARDIOGRAM TRACING: CPT | Performed by: INTERNAL MEDICINE

## 2021-06-14 PROCEDURE — G0439 PPPS, SUBSEQ VISIT: HCPCS | Performed by: INTERNAL MEDICINE

## 2021-06-14 NOTE — PROGRESS NOTES
The ABCs of the Annual Wellness Visit  Initial Medicare Wellness Visit    Chief Complaint   Patient presents with   • Medicare Wellness-subsequent   • Hypertension     f/u       Subjective   History of Present Illness:  Kamille Miles is a 86 y.o. female who presents for an Initial Medicare Wellness Visit.    HPI  R knee pain on and off. A little swelling. Doesn't bother her or keep her from walking.  She has not needed to take anything for it.  It does not hamper her exercises or walking.    HPI  Heartburn for a few months. [place] in [place] and it was [place] 2/9/2021.      HPI  Nasal drip and post nasal drip.  Nasal sprays help some.    HPI  Decreases in hearing both ears. Feel stopped up.  She has had to have the wax washed out several times in the past.    CHRONIC CONDITIONS:    She admits she stopped lisinopril awhile in the Winter because she thought fatigue and shortness of breath with exercise might be due to it. Then she went back on it. She feels fine on it now.    For hyperlipidemia, she is taking simvastatin every evening.  She eats a low-fat diet and exercises regularly.    BPs at home 120-134/70s usually.  Occasionally sees high reading like 167-197 systolic. She admits that she probably doesn't sit and rest before checking BP. She is not sure if high readings had anything to do with stress.    For insomnia, she uses low-dose Ambien occasionally.  It does help her a lot.  She denies any side effects.    HEALTH RISK ASSESSMENT    Recent Hospitalizations:  No hospitalization(s) within the last year.    Current Medical Providers:  Patient Care Team:  Brisa Lord MD as PCP - General  Brisa Lord MD as PCP - Family Medicine    Smoking Status:  Social History     Tobacco Use   Smoking Status Never Smoker   Smokeless Tobacco Never Used       Alcohol Consumption:  Social History     Substance and Sexual Activity   Alcohol Use Yes   • Alcohol/week: 1.0 standard drinks   • Types: 1 Glasses of  wine per week    Comment: at dinner       Depression Screen:   PHQ-2/PHQ-9 Depression Screening 6/14/2021   Little interest or pleasure in doing things 0   Feeling down, depressed, or hopeless 0   Total Score 0       Fall Risk Screen:  HARLAN Fall Risk Assessment was completed, and patient is at LOW risk for falls.Assessment completed on:6/14/2021    Health Habits and Functional and Cognitive Screening:  Functional & Cognitive Status 6/14/2021   Do you have difficulty preparing food and eating? No   Do you have difficulty bathing yourself, getting dressed or grooming yourself? No   Do you have difficulty using the toilet? No   Do you have difficulty moving around from place to place? No   Do you have trouble with steps or getting out of a bed or a chair? No   Current Diet Well Balanced Diet   Dental Exam Up to date   Eye Exam Up to date   Exercise (times per week) 4 times per week   Current Exercise Activities Include Walking   Do you need help using the phone?  No   Are you deaf or do you have serious difficulty hearing?  No   Do you need help with transportation? No   Do you need help shopping? No   Do you need help preparing meals?  No   Do you need help with housework?  No   Do you need help with laundry? No   Do you need help taking your medications? No   Do you need help managing money? No   Do you ever drive or ride in a car without wearing a seat belt? No   Have you felt unusual stress, anger or loneliness in the last month? No   Who do you live with? Spouse   If you need help, do you have trouble finding someone available to you? No   Have you been bothered in the last four weeks by sexual problems? No   Do you have difficulty concentrating, remembering or making decisions? No       Does the patient have evidence of cognitive impairment? No    Asprin use counseling:Taking ASA appropriately as indicated    Age-appropriate Screening Schedule:  Refer to the list below for future screening recommendations based  on patient's age, sex and/or medical conditions. Orders for these recommended tests are listed in the plan section. The patient has been provided with a written plan.    Age appropriate preventive counseling done including age appropriate vaccines, self breast exam, regular dental visits, mental health, injury prevention such as wearing seat belt and preventing falls, healthy  nutrition, healthy weight, regular physical exercise. Alcohol use is moderate.  Tobacco history-none. Drug use-none.  STD's-not at risk.          Health Maintenance   Topic Date Due   • TDAP/TD VACCINES (2 - Tdap) 08/14/2007   • INFLUENZA VACCINE  08/01/2021   • DXA SCAN  01/22/2022   • LIPID PANEL  06/01/2022   • ZOSTER VACCINE  Completed   • MAMMOGRAM  Discontinued        The following portions of the patient's history were reviewed and updated as appropriate: allergies, current medications, past family history, past medical history, past social history, past surgical history and problem list.    Outpatient Medications Prior to Visit   Medication Sig Dispense Refill   • aspirin 81 MG tablet Take 1 tablet by mouth Daily.     • Azelastine HCl 137 MCG/SPRAY solution 2 sprays into the nostril(s) as directed by provider 2 (two) times a day. (Patient taking differently: 2 sprays into the nostril(s) as directed by provider 2 (two) times a day. prn) 1 bottle 5   • Coenzyme Q10 (CO Q-10) 100 MG capsule Take 1 capsule by mouth Daily.     • hydrocortisone 1 % cream Apply  topically to the appropriate area as directed 2 (Two) Times a Day As Needed for Rash (itching). 1 each 0   • lisinopril (PRINIVIL,ZESTRIL) 5 MG tablet Take 1 tablet by mouth Daily. 90 tablet 1   • Omega-3 Fatty Acids (FISH OIL) 1000 MG capsule capsule Take 1 capsule by mouth Daily.     • Pediatric Multiple Vitamins (CHEWABLE MULTIPLE VITAMINS PO) Take 1 tablet by mouth Daily.     • Probiotic Product (TRUBIOTICS PO) Take 1 capsule by mouth Daily. Taking QOD     • simvastatin (ZOCOR) 10  MG tablet TAKE 1 TABLET BY MOUTH EVERY EVENING 90 tablet 1   • zolpidem CR (Ambien CR) 6.25 MG CR tablet Take 1 tablet by mouth At Night As Needed for Sleep. 30 tablet 2   • Ascorbic Acid (VITAMIN C PO) Take  by mouth Daily.       No facility-administered medications prior to visit.       Patient Active Problem List   Diagnosis   • Urinary frequency   • Light-headed feeling   • Mixed hyperlipidemia   • Senile osteoporosis   • Primary insomnia   • Nocturia   • Status post carpal tunnel release   • Allergic rhinitis   • Ankle edema, bilateral   • External hemorrhoid   • Osteoarthritis   • Sinus headache   • Benign essential hypertension   • Other fatigue   • Bilateral hearing loss due to cerumen impaction   • Heartburn   • Chronic pain of right knee       Advanced Care Planning:  ACP discussion was held with the patient during this visit. Patient has an advance directive (not in EMR), copy requested.    Review of Systems   Constitutional: Negative for chills, fatigue and fever.   HENT: Positive for hearing loss. Negative for congestion, ear pain and sinus pressure.         Ears stopped up   Eyes: Negative for visual disturbance.   Respiratory: Negative for cough, chest tightness, shortness of breath and wheezing.    Cardiovascular: Negative for chest pain, palpitations and leg swelling.   Gastrointestinal: Negative for abdominal pain, blood in stool and constipation.   Endocrine: Negative for cold intolerance and heat intolerance.   Genitourinary: Negative for dysuria and frequency.        Has to get up and urinate twice during the night.  She is able to go back to sleep.   Musculoskeletal: Negative for arthralgias, back pain and gait problem.   Skin: Negative for color change and rash.   Allergic/Immunologic: Negative for environmental allergies.   Neurological: Negative for dizziness, speech difficulty and headaches.   Hematological: Negative for adenopathy. Does not bruise/bleed easily.   Psychiatric/Behavioral:  "Negative for confusion, dysphoric mood, sleep disturbance and suicidal ideas. The patient is not nervous/anxious.        Compared to one year ago, the patient feels her physical health is the same.  Compared to one year ago, the patient feels her mental health is the same.    Reviewed chart for potential of high risk medication in the elderly: yes  Reviewed chart for potential of harmful drug interactions in the elderly:yes    Objective         Vitals:    06/14/21 1500   BP: 150/76   BP Location: Left arm   Patient Position: Sitting   Cuff Size: Adult   Pulse: 76   Temp: 98.7 °F (37.1 °C)   TempSrc: Infrared   SpO2: 98%   Weight: 57 kg (125 lb 9.6 oz)   Height: 167.6 cm (65.98\")   PainSc: 0-No pain       Body mass index is 20.28 kg/m².  Discussed the patient's BMI with her. The BMI is in the acceptable range.    Physical Exam  Vitals and nursing note reviewed.   Constitutional:       Appearance: She is well-developed.   HENT:      Head: Normocephalic.      Right Ear: Tympanic membrane and external ear normal. There is impacted cerumen.      Left Ear: Tympanic membrane and external ear normal. There is impacted cerumen.      Ears:      Comments: Cerumen removal bilaterally by instrumentation using curette after informed consent.  Patient tolerated procedure well.    Eyes:      Conjunctiva/sclera: Conjunctivae normal.      Pupils: Pupils are equal, round, and reactive to light.   Neck:      Thyroid: No thyromegaly.   Cardiovascular:      Rate and Rhythm: Normal rate and regular rhythm.      Heart sounds: Normal heart sounds.   Pulmonary:      Effort: Pulmonary effort is normal.      Breath sounds: Normal breath sounds. No wheezing.   Chest:      Breasts:         Right: No inverted nipple, mass, nipple discharge, skin change or tenderness.         Left: No inverted nipple, mass, nipple discharge, skin change or tenderness.   Abdominal:      General: Bowel sounds are normal.      Palpations: Abdomen is soft.      " Tenderness: There is no abdominal tenderness.   Musculoskeletal:         General: No tenderness. Normal range of motion.      Cervical back: Normal range of motion and neck supple.      Right knee: Swelling and deformity present. No erythema. No tenderness.      Left knee: No swelling, deformity or erythema. No tenderness.        Legs:    Lymphadenopathy:      Cervical: No cervical adenopathy.   Skin:     General: Skin is warm and dry.      Findings: No rash.   Neurological:      Mental Status: She is alert and oriented to person, place, and time.      Cranial Nerves: No cranial nerve deficit.      Sensory: No sensory deficit.      Coordination: Coordination normal.      Gait: Gait normal.   Psychiatric:         Attention and Perception: Attention normal.         Mood and Affect: Mood normal.         Speech: Speech normal.         Behavior: Behavior normal.         Thought Content: Thought content normal.         Judgment: Judgment normal.       ECG 12 Lead    Date/Time: 6/14/2021 6:16 PM  Performed by: Brisa Lord MD  Authorized by: Brisa Lord MD   Comparison: compared with previous ECG   Similar to previous ECG  Rhythm: sinus rhythm  Rate: normal  BPM: 62  Conduction: conduction normal  ST Segments: ST segments normal  T Waves: T waves normal  QRS axis: normal    Clinical impression: normal ECG              Lab Results   Component Value Date    TRIG 69 06/01/2021    HDL 89 (H) 06/01/2021     (H) 06/01/2021    VLDL 12 06/01/2021        Assessment/Plan   Medicare Risks and Personalized Health Plan  CMS Preventative Services Quick Reference  Cardiovascular risk  Osteoporosis Risk    The above risks/problems have been discussed with the patient.  Pertinent information has been shared with the patient in the After Visit Summary.  Follow up plans and orders are seen below in the Assessment/Plan Section.  Patient Instructions   Problem List Items Addressed This Visit        Cardiac and Vasculature     Benign essential hypertension (Chronic)    Overview     6/14/2021 Brisa Lord MD    Continue 5 mg lisinopril daily.    Continue to avoid salt in the diet and avoid sodas.         Relevant Medications    lisinopril (PRINIVIL,ZESTRIL) 5 MG tablet    Other Relevant Orders    CBC & Differential (Completed)    Microalbumin / Creatinine Urine Ratio - Urine, Clean Catch (Completed)    Urinalysis without microscopic (no culture) - Urine, Clean Catch (Completed)    Mixed hyperlipidemia    Overview     6/14/2021 Brisa Lord MD    Continue simvastatin every evening.  Continue low-fat diet and regular exercise.         Relevant Medications    simvastatin (ZOCOR) 10 MG tablet    Other Relevant Orders    Comprehensive Metabolic Panel (Completed)    Lipid Panel (Completed)    TSH (Completed)       ENT    Bilateral hearing loss due to cerumen impaction (Chronic)    Overview     6/14/2021 Brisa Lord MD    Cerumen removal bilaterally by instrumentation using curette after informed consent.  Patient tolerated procedure well.            Gastrointestinal Abdominal     Heartburn (Chronic)    Overview     6/14/2021 Brisa Lord MD    EGD in [place] 2/9/2021 was normal.  Biopsies were normal.  Symptoms have improved.            Musculoskeletal and Injuries    Chronic pain of right knee (Chronic)    Overview     6/14/2021 Brisa Lord MD    Symptoms are mild.  Probably due to mild osteoarthritis.    She will continue to stay active.  Use a cold pack on the knee as needed for pain, inflammation, and swelling.         Senile osteoporosis    Overview     6/14/2021 Brisa Lord MD    1/22/2020 DEXA scan showed osteopenia of the spine (T score -2.3) and mild osteopenia of the hip (T score -1.3).  T-scores were stable.    Continue weight bearing exercises every day. Continue calcium and vitamin D.         Relevant Orders    Vitamin D 25 Hydroxy (Completed)       Sleep    Primary insomnia    Overview      6/14/2021 Brisa Lord MD    Continue low dose ambien as needed.     We discussed sleep hygiene including going to bed at the same time and getting up at the same time every day, going to bed early enough to get 7 or 8 hours in bed, reading and relaxing before bedtime, and avoiding the TV, computer, phone, iPad close to bedtime.           Relevant Medications    zolpidem CR (Ambien CR) 6.25 MG CR tablet       Symptoms and Signs    Other fatigue    Overview     6/14/2021 Brisa Lord MD    Mild fatigue and mild shortness of breath with walking improved with continuing exercise. Probably were due to deconditioning due to sheltering at home during Covid pandemic             Relevant Orders    Vitamin B12 (Completed)      Other Visit Diagnoses     Medicare annual wellness visit, subsequent    -  Primary           Follow Up:  No follow-ups on file.     An After Visit Summary and PPPS were given to the patient.

## 2021-06-14 NOTE — PATIENT INSTRUCTIONS
Patient Instructions   Problem List Items Addressed This Visit        Cardiac and Vasculature    Benign essential hypertension (Chronic)    Overview     6/14/2021 Brisa Lord MD    Continue 5 mg lisinopril daily.    Continue to avoid salt in the diet and avoid sodas.         Relevant Medications    lisinopril (PRINIVIL,ZESTRIL) 5 MG tablet    Other Relevant Orders    CBC & Differential (Completed)    Microalbumin / Creatinine Urine Ratio - Urine, Clean Catch (Completed)    Urinalysis without microscopic (no culture) - Urine, Clean Catch (Completed)    Mixed hyperlipidemia    Overview     6/14/2021 Brisa Lord MD    Continue simvastatin every evening.  Continue low-fat diet and regular exercise.         Relevant Medications    simvastatin (ZOCOR) 10 MG tablet    Other Relevant Orders    Comprehensive Metabolic Panel (Completed)    Lipid Panel (Completed)    TSH (Completed)       ENT    Bilateral hearing loss due to cerumen impaction (Chronic)    Overview     6/14/2021 Birsa Lord MD    Cerumen removal bilaterally by instrumentation using curette after informed consent.  Patient tolerated procedure well.            Gastrointestinal Abdominal     Heartburn (Chronic)    Overview     6/14/2021 Brisa Lord MD    EGD in [place] 2/9/2021 was normal.  Biopsies were normal.  Symptoms have improved.            Musculoskeletal and Injuries    Chronic pain of right knee (Chronic)    Overview     6/14/2021 Brisa Lord MD    Symptoms are mild.  Probably due to mild osteoarthritis.    She will continue to stay active.  Use a cold pack on the knee as needed for pain, inflammation, and swelling.         Senile osteoporosis    Overview     6/14/2021 Brisa Lord MD    1/22/2020 DEXA scan showed osteopenia of the spine (T score -2.3) and mild osteopenia of the hip (T score -1.3).  T-scores were stable.    Continue weight bearing exercises every day. Continue calcium and vitamin D.         Relevant  "Orders    Vitamin D 25 Hydroxy (Completed)       Sleep    Primary insomnia    Overview     6/14/2021 Brisa Lord MD    Continue low dose ambien as needed.     We discussed sleep hygiene including going to bed at the same time and getting up at the same time every day, going to bed early enough to get 7 or 8 hours in bed, reading and relaxing before bedtime, and avoiding the TV, computer, phone, iPad close to bedtime.           Relevant Medications    zolpidem CR (Ambien CR) 6.25 MG CR tablet       Symptoms and Signs    Other fatigue    Overview     6/14/2021 Brisa Lord MD    Mild fatigue and mild shortness of breath with walking improved with continuing exercise. Probably were due to deconditioning due to sheltering at home during Covid pandemic             Relevant Orders    Vitamin B12 (Completed)      Other Visit Diagnoses     Medicare annual wellness visit, subsequent    -  Primary          https://www.nhlbi.nih.gov/files/docs/public/heart/dash_brief.pdf\">   DASH Eating Plan  DASH stands for Dietary Approaches to Stop Hypertension. The DASH eating plan is a healthy eating plan that has been shown to:  · Reduce high blood pressure (hypertension).  · Reduce your risk for type 2 diabetes, heart disease, and stroke.  · Help with weight loss.  What are tips for following this plan?  Reading food labels  · Check food labels for the amount of salt (sodium) per serving. Choose foods with less than 5 percent of the Daily Value of sodium. Generally, foods with less than 300 milligrams (mg) of sodium per serving fit into this eating plan.  · To find whole grains, look for the word \"whole\" as the first word in the ingredient list.  Shopping  · Buy products labeled as \"low-sodium\" or \"no salt added.\"  · Buy fresh foods. Avoid canned foods and pre-made or frozen meals.  Cooking  · Avoid adding salt when cooking. Use salt-free seasonings or herbs instead of table salt or sea salt. Check with your health care " provider or pharmacist before using salt substitutes.  · Do not kohli foods. Cook foods using healthy methods such as baking, boiling, grilling, roasting, and broiling instead.  · Cook with heart-healthy oils, such as olive, canola, avocado, soybean, or sunflower oil.  Meal planning    · Eat a balanced diet that includes:  ? 4 or more servings of fruits and 4 or more servings of vegetables each day. Try to fill one-half of your plate with fruits and vegetables.  ? 6-8 servings of whole grains each day.  ? Less than 6 oz (170 g) of lean meat, poultry, or fish each day. A 3-oz (85-g) serving of meat is about the same size as a deck of cards. One egg equals 1 oz (28 g).  ? 2-3 servings of low-fat dairy each day. One serving is 1 cup (237 mL).  ? 1 serving of nuts, seeds, or beans 5 times each week.  ? 2-3 servings of heart-healthy fats. Healthy fats called omega-3 fatty acids are found in foods such as walnuts, flaxseeds, fortified milks, and eggs. These fats are also found in cold-water fish, such as sardines, salmon, and mackerel.  · Limit how much you eat of:  ? Canned or prepackaged foods.  ? Food that is high in trans fat, such as some fried foods.  ? Food that is high in saturated fat, such as fatty meat.  ? Desserts and other sweets, sugary drinks, and other foods with added sugar.  ? Full-fat dairy products.  · Do not salt foods before eating.  · Do not eat more than 4 egg yolks a week.  · Try to eat at least 2 vegetarian meals a week.  · Eat more home-cooked food and less restaurant, buffet, and fast food.  Lifestyle  · When eating at a restaurant, ask that your food be prepared with less salt or no salt, if possible.  · If you drink alcohol:  ? Limit how much you use to:  § 0-1 drink a day for women who are not pregnant.  § 0-2 drinks a day for men.  ? Be aware of how much alcohol is in your drink. In the [place] one drink equals one 12 oz bottle of beer (355 mL), one 5 oz glass of wine (148 mL), or one 1½ oz  glass of hard liquor (44 mL).  General information  · Avoid eating more than 2,300 mg of salt a day. If you have hypertension, you may need to reduce your sodium intake to 1,500 mg a day.  · Work with your health care provider to maintain a healthy body weight or to lose weight. Ask what an ideal weight is for you.  · Get at least 30 minutes of exercise that causes your heart to beat faster (aerobic exercise) most days of the week. Activities may include walking, swimming, or biking.  · Work with your health care provider or dietitian to adjust your eating plan to your individual calorie needs.  What foods should I eat?  Fruits  All fresh, dried, or frozen fruit. Canned fruit in natural juice (without added sugar).  Vegetables  Fresh or frozen vegetables (raw, steamed, roasted, or grilled). Low-sodium or reduced-sodium tomato and vegetable juice. Low-sodium or reduced-sodium tomato sauce and tomato paste. Low-sodium or reduced-sodium canned vegetables.  Grains  Whole-grain or whole-wheat bread. Whole-grain or whole-wheat pasta. Brown rice. Oatmeal. Quinoa. Bulgur. Whole-grain and low-sodium cereals. Lauren bread. Low-fat, low-sodium crackers. Whole-wheat flour tortillas.  Meats and other proteins  Skinless chicken or turkey. Ground chicken or turkey. Pork with fat trimmed off. Fish and seafood. Egg whites. Dried beans, peas, or lentils. Unsalted nuts, nut butters, and seeds. Unsalted canned beans. Lean cuts of beef with fat trimmed off. Low-sodium, lean precooked or cured meat, such as sausages or meat loaves.  Dairy  Low-fat (1%) or fat-free (skim) milk. Reduced-fat, low-fat, or fat-free cheeses. Nonfat, low-sodium ricotta or cottage cheese. Low-fat or nonfat yogurt. Low-fat, low-sodium cheese.  Fats and oils  Soft margarine without trans fats. Vegetable oil. Reduced-fat, low-fat, or light mayonnaise and salad dressings (reduced-sodium). Canola, safflower, olive, avocado, soybean, and sunflower oils.  Avocado.  Seasonings and condiments  Herbs. Spices. Seasoning mixes without salt.  Other foods  Unsalted popcorn and pretzels. Fat-free sweets.  The items listed above may not be a complete list of foods and beverages you can eat. Contact a dietitian for more information.  What foods should I avoid?  Fruits  Canned fruit in a light or heavy syrup. Fried fruit. Fruit in cream or butter sauce.  Vegetables  Creamed or fried vegetables. Vegetables in a cheese sauce. Regular canned vegetables (not low-sodium or reduced-sodium). Regular canned tomato sauce and paste (not low-sodium or reduced-sodium). Regular tomato and vegetable juice (not low-sodium or reduced-sodium). Pickles. Olives.  Grains  Baked goods made with fat, such as croissants, muffins, or some breads. Dry pasta or rice meal packs.  Meats and other proteins  Fatty cuts of meat. Ribs. Fried meat. Blanco. Bologna, salami, and other precooked or cured meats, such as sausages or meat loaves. Fat from the back of a pig (fatback). Bratwurst. Salted nuts and seeds. Canned beans with added salt. Canned or smoked fish. Whole eggs or egg yolks. Chicken or turkey with skin.  Dairy  Whole or 2% milk, cream, and half-and-half. Whole or full-fat cream cheese. Whole-fat or sweetened yogurt. Full-fat cheese. Nondairy creamers. Whipped toppings. Processed cheese and cheese spreads.  Fats and oils  Butter. Stick margarine. Lard. Shortening. Ghee. Blanco fat. Tropical oils, such as coconut, palm kernel, or palm oil.  Seasonings and condiments  Onion salt, garlic salt, seasoned salt, table salt, and sea salt. Worcestershire sauce. Tartar sauce. Barbecue sauce. Teriyaki sauce. Soy sauce, including reduced-sodium. Steak sauce. Canned and packaged gravies. Fish sauce. Oyster sauce. Cocktail sauce. Store-bought horseradish. Ketchup. Mustard. Meat flavorings and tenderizers. Bouillon cubes. Hot sauces. Pre-made or packaged marinades. Pre-made or packaged taco seasonings. Relishes.  Regular salad dressings.  Other foods  Salted popcorn and pretzels.  The items listed above may not be a complete list of foods and beverages you should avoid. Contact a dietitian for more information.  Where to find more information  · National Heart, Lung, and Blood [place]: www.nhlbi.nih.gov  · American Heart Association: www.heart.org  · Academy of Nutrition and Dietetics: www.eatright.org  · National Kidney Foundation: www.kidney.org  Summary  · The DASH eating plan is a healthy eating plan that has been shown to reduce high blood pressure (hypertension). It may also reduce your risk for type 2 diabetes, heart disease, and stroke.  · When on the DASH eating plan, aim to eat more fresh fruits and vegetables, whole grains, lean proteins, low-fat dairy, and heart-healthy fats.  · With the DASH eating plan, you should limit salt (sodium) intake to 2,300 mg a day. If you have hypertension, you may need to reduce your sodium intake to 1,500 mg a day.  · Work with your health care provider or dietitian to adjust your eating plan to your individual calorie needs.  This information is not intended to replace advice given to you by your health care provider. Make sure you discuss any questions you have with your health care provider.  Document Revised: 11/20/2020 Document Reviewed: 11/20/2020  [employer] Patient Education © 2021 [employer]

## 2021-06-24 ENCOUNTER — TELEPHONE (OUTPATIENT)
Dept: INTERNAL MEDICINE | Facility: CLINIC | Age: 86
End: 2021-06-24

## 2021-06-24 RX ORDER — HYDROCORTISONE 25 MG/G
CREAM TOPICAL 2 TIMES DAILY PRN
Qty: 28 G | Refills: 0 | Status: SHIPPED | OUTPATIENT
Start: 2021-06-24 | End: 2022-02-10 | Stop reason: SDUPTHER

## 2021-06-24 NOTE — TELEPHONE ENCOUNTER
----- Message from Kamille Miles sent at 6/24/2021  2:26 PM EDT -----  Regarding: Prescription Question  Contact: [phone]  I had a prescription some time ago for  Proctozone-HC 2.5% for rectal itch/small  hemorrhoids and would like to refill it or  whatever you suggest.  Thanks!

## 2021-07-27 DIAGNOSIS — E78.2 MIXED HYPERLIPIDEMIA: ICD-10-CM

## 2021-07-28 RX ORDER — SIMVASTATIN 10 MG
10 TABLET ORAL EVERY EVENING
Qty: 90 TABLET | Refills: 1 | Status: SHIPPED | OUTPATIENT
Start: 2021-07-28 | End: 2022-01-13

## 2021-08-24 ENCOUNTER — TELEPHONE (OUTPATIENT)
Dept: INTERNAL MEDICINE | Facility: CLINIC | Age: 86
End: 2021-08-24

## 2021-08-24 DIAGNOSIS — F51.01 PRIMARY INSOMNIA: Primary | ICD-10-CM

## 2021-08-24 DIAGNOSIS — R35.1 NOCTURIA: Primary | ICD-10-CM

## 2021-08-24 DIAGNOSIS — R35.0 URINARY FREQUENCY: ICD-10-CM

## 2021-08-24 RX ORDER — ZOLPIDEM TARTRATE 10 MG/1
TABLET ORAL
Qty: 30 TABLET | Refills: 2 | Status: SHIPPED | OUTPATIENT
Start: 2021-08-24 | End: 2023-02-17 | Stop reason: SDUPTHER

## 2021-09-02 ENCOUNTER — PATIENT MESSAGE (OUTPATIENT)
Dept: INTERNAL MEDICINE | Facility: CLINIC | Age: 86
End: 2021-09-02

## 2021-09-02 NOTE — TELEPHONE ENCOUNTER
From: Kamille Miles  To: Brisa Lord MD  Sent: 9/2/2021 2:28 PM EDT  Subject: Non-Urgent Medical Question    I had my 2nd COVID vaccine in mid Feb...  should I go ahead & get the booster whenever  I can? I'm not immunocompromised....  unless age counts!  Also..should I go ahead & get the flu shot  now?  Thanks.  Kamille

## 2021-09-09 DIAGNOSIS — I10 BENIGN ESSENTIAL HYPERTENSION: ICD-10-CM

## 2021-09-09 RX ORDER — LISINOPRIL 5 MG/1
5 TABLET ORAL DAILY
Qty: 90 TABLET | Refills: 1 | Status: SHIPPED | OUTPATIENT
Start: 2021-09-09 | End: 2022-04-05

## 2021-09-13 ENCOUNTER — OFFICE VISIT (OUTPATIENT)
Dept: UROLOGY | Facility: CLINIC | Age: 86
End: 2021-09-13

## 2021-09-13 VITALS
BODY MASS INDEX: 20.18 KG/M2 | HEART RATE: 54 BPM | SYSTOLIC BLOOD PRESSURE: 138 MMHG | WEIGHT: 125.6 LBS | OXYGEN SATURATION: 99 % | HEIGHT: 66 IN | DIASTOLIC BLOOD PRESSURE: 80 MMHG

## 2021-09-13 DIAGNOSIS — R35.1 NOCTURIA: ICD-10-CM

## 2021-09-13 DIAGNOSIS — R35.0 URINARY FREQUENCY: ICD-10-CM

## 2021-09-13 LAB
BILIRUB BLD-MCNC: NEGATIVE MG/DL
CLARITY, POC: CLEAR
COLOR UR: YELLOW
GLUCOSE UR STRIP-MCNC: NEGATIVE MG/DL
KETONES UR QL: NEGATIVE
LEUKOCYTE EST, POC: NEGATIVE
NITRITE UR-MCNC: NEGATIVE MG/ML
PH UR: 5.5 [PH] (ref 5–8)
PROT UR STRIP-MCNC: NEGATIVE MG/DL
RBC # UR STRIP: NEGATIVE /UL
SP GR UR: 1.01 (ref 1–1.03)
UROBILINOGEN UR QL: NORMAL

## 2021-09-13 PROCEDURE — 99205 OFFICE O/P NEW HI 60 MIN: CPT | Performed by: UROLOGY

## 2021-09-13 PROCEDURE — 81003 URINALYSIS AUTO W/O SCOPE: CPT | Performed by: UROLOGY

## 2021-09-13 PROCEDURE — 51798 US URINE CAPACITY MEASURE: CPT | Performed by: UROLOGY

## 2021-09-13 NOTE — PROGRESS NOTES
[facility] Female Office Visit      Patient Name: Kamille Miles  : 1935   MRN: [id number]     Chief Complaint:  Lower Urinary Tract Symptoms.    Chief Complaint   Patient presents with   • Nocturia   • Urinary Frequency       Referring Provider: Brisa Lord MD    History of Present Illness: Mrs. Miles is a 86 y.o. female with history of lower urinary tract symptoms. She presents today for evaluation of primarily nocturia.  The patient reports mild daytime frequency but associates this with increased fluid intake and caffeinated beverages.  She reports that at nighttime she will wake on average 2-4 times per night.  She denies difficulty starting urinary stream, reports strong urinary stream, reports feeling of subjective bladder emptying.  She denies dysuria.  She denies hematuria.  She denies history of urinary tract infection.  She has previously been evaluated by urologist and trialed on medications including tolterodine and Myrbetriq.  She denies improvement in her urinary tract symptoms on these medications.  She reports that she has previously performed multiple voiding diaries and were told that this was negative and she did not have evidence of nocturnal polyuria.  She does report that she is a light sleeper and has difficulty sleeping, she takes Ambien to aid in sleep and she reports that when she takes this medication her nocturia/frequency is improved.  She denies dysuria or history of recurrent urinary tract infection.  The patient denies hematuria.  Patient denies prior urologic instrumentation or procedure.    Previous treatments include: Tolterodine, Myrbetriq    Subjective      Review of System: Review of Systems   Constitutional: Negative for chills, fatigue, fever and unexpected weight change.   HENT: Negative for sore throat.    Eyes: Negative for visual disturbance.   Respiratory: Negative for cough, chest tightness and shortness of breath.    Cardiovascular: Negative for chest  pain and leg swelling.   Gastrointestinal: Negative for blood in stool, constipation, diarrhea, nausea, rectal pain and vomiting.   Genitourinary: Negative for decreased urine volume, difficulty urinating, dysuria, enuresis, flank pain, frequency, genital sores, hematuria and urgency.   Musculoskeletal: Negative for back pain and joint swelling.   Skin: Negative for rash and wound.   Neurological: Negative for seizures, speech difficulty, weakness and headaches.   Psychiatric/Behavioral: Negative for confusion, sleep disturbance and suicidal ideas. The patient is not nervous/anxious.       I have reviewed the ROS documented by my clinical staff, updated appropriate and I agree. Td Eli MD    Past Medical History:  Past Medical History:   Diagnosis Date   • Headache     cervicogenic -work-up by neurologist in [place], [place]   • Hypertension        Past Surgical History:  History reviewed. No pertinent surgical history.    Medications:    Current Outpatient Medications:   •  Ascorbic Acid (VITAMIN C PO), Take  by mouth Daily., Disp: , Rfl:   •  aspirin 81 MG tablet, Take 1 tablet by mouth Daily., Disp: , Rfl:   •  Azelastine HCl 137 MCG/SPRAY solution, 2 sprays into the nostril(s) as directed by provider 2 (two) times a day. (Patient taking differently: 2 sprays into the nostril(s) as directed by provider 2 (two) times a day. prn), Disp: 1 bottle, Rfl: 5  •  Calcium 500-100 MG-UNIT chewable tablet, Chew 1 tablet Every 4 (Four) Hours As Needed., Disp: , Rfl:   •  Coenzyme Q10 (CO Q-10) 100 MG capsule, Take 1 capsule by mouth Daily., Disp: , Rfl:   •  hydrocortisone 1 % cream, Apply  topically to the appropriate area as directed 2 (Two) Times a Day As Needed for Rash (itching)., Disp: 1 each, Rfl: 0  •  Hydrocortisone, Perianal, (ANUSOL-HC) 2.5 % rectal cream, Insert  into the rectum 2 (Two) Times a Day As Needed for Hemorrhoids., Disp: 28 g, Rfl: 0  •  lisinopril (PRINIVIL,ZESTRIL) 5 MG tablet, TAKE 1  "TABLET BY MOUTH DAILY, Disp: 90 tablet, Rfl: 1  •  Omega-3 Fatty Acids (FISH OIL) 1000 MG capsule capsule, Take 1 capsule by mouth Daily., Disp: , Rfl:   •  Pediatric Multiple Vitamins (CHEWABLE MULTIPLE VITAMINS PO), Take 1 tablet by mouth Daily., Disp: , Rfl:   •  Probiotic Product (TRUBIOTICS PO), Take 1 capsule by mouth Daily. Taking QOD, Disp: , Rfl:   •  simvastatin (ZOCOR) 10 MG tablet, TAKE 1 TABLET BY MOUTH EVERY EVENING, Disp: 90 tablet, Rfl: 1  •  zolpidem (AMBIEN) 10 MG tablet, Take 1/2 to 1 tablet every evening as needed, Disp: 30 tablet, Rfl: 2    Allergies:  No Known Allergies    Social History:  Social History     Socioeconomic History   • Marital status:      Spouse name: Not on file   • Number of children: Not on file   • Years of education: Not on file   • Highest education level: Not on file   Tobacco Use   • Smoking status: Never Smoker   • Smokeless tobacco: Never Used   Substance and Sexual Activity   • Alcohol use: Yes     Alcohol/week: 5.0 standard drinks     Types: 5 Glasses of wine per week     Comment: at dinner   • Drug use: No   • Sexual activity: Yes     Partners: Male       Family History:  Family History   Problem Relation Age of Onset   • Arrhythmia Mother         Pacemaker placed   • Lymphoma Father    • Breast cancer Neg Hx    • Ovarian cancer Neg Hx          PVR:   08mL    Objective     Physical Exam:   Vital Signs:   Vitals:    09/13/21 0921   BP: 138/80   Pulse: 54   SpO2: 99%   Weight: 57 kg (125 lb 9.6 oz)   Height: 167.6 cm (66\")   PainSc: 0-No pain     Body mass index is 20.27 kg/m².     Physical Exam  Vitals and nursing note reviewed.   Constitutional:       Appearance: Normal appearance. She is normal weight.   Pulmonary:      Effort: Pulmonary effort is normal.   Musculoskeletal:         General: Normal range of motion.   Skin:     General: Skin is warm and dry.   Neurological:      General: No focal deficit present.      Mental Status: She is alert and oriented " to person, place, and time. Mental status is at baseline.   Psychiatric:         Mood and Affect: Mood normal.         Behavior: Behavior normal.         Thought Content: Thought content normal.         Judgment: Judgment normal.         Labs:   Brief Urine Lab Results  (Last result in the past 365 days)      Color   Clarity   Blood   Leuk Est   Nitrite   Protein   CREAT   Urine HCG        09/13/21 0921 Yellow Clear Negative Negative Negative Negative               I personally viewed the patient's urinalysis from today 9/13/2021: Negative for blood ketone bilirubin protein nitrite LE    Lab Results   Component Value Date    GLUCOSE 92 06/01/2021    CALCIUM 9.8 06/01/2021     06/01/2021    K 3.7 06/01/2021    CO2 32.0 (H) 06/01/2021    CL 99 06/01/2021    BUN 15 06/01/2021    CREATININE 0.88 06/01/2021    EGFRIFNONA 61 06/01/2021    BCR 17.0 06/01/2021    ANIONGAP 7.0 06/01/2021       Lab Results   Component Value Date    WBC 4.48 06/01/2021    HGB 15.3 06/01/2021    HCT 46.5 06/01/2021    MCV 95.3 06/01/2021     06/01/2021     I personally viewed the patient's most recent laboratory values including a creatinine of 0.8 and EGFR of 61 and 6021    Images:   No recent intra-abdominal imaging available for review      Measures:   Tobacco:   Kamille Miles  reports that she has never smoked. She has never used smokeless tobacco.. I have educated her on the risk of diseases from using tobacco products.       Urine Incontinence: ( NOUI)  Patient reports that she is not currently experiencing any symptoms of urinary incontinence.      Assessment / Plan      Assessment:  Mrs. Miles is a 86 y.o. female who presents today with lower urinary tract symptoms.  Patient reports primary bother symptom including nocturia 2-4 X.  She reports that she does have difficulty with sleep and takes Ambien for sleep and when she does this decreases her nocturia.  She denies dysuria, history of urinary tract infection,  hematuria.  PVR 8 mL.    Diagnoses and all orders for this visit:    1. Urinary frequency  -     POC Urinalysis Dipstick, Automated    2. Nocturia  -     POC Urinalysis Dipstick, Automated        Patient Education:   Today we discussed the etiology and management of lower urinary tract symptoms and nocturia.  We discussed that the patient has a limited daytime symptoms but her symptoms are primarily at night.  We discussed her sleep hygiene as well as her difficulty with sleep and her prescription for Ambien.  We discussed that urinary symptoms can be worsened with poor sleep hygiene and difficulty sleeping.  She denies difficulty starting stream, strong urinary stream and subjectively feels she empties bladder.  She is not overly bothered with her symptoms but wanted to ensure a complete work-up.  We discussed behavioral strategies including limiting caffeinated beverage intake, decreasing fluid intake prior to sleep.  We discussed improving sleep hygiene and possibility of a sleep study for work-up of potential sleep apnea.  We discussed that sleep apnea can worsen nocturia and nighttime urinary symptoms.  We discussed her prior history of medicines for urinary urgency and reviewed that these are not indicated for her nocturia.  We discussed her prior voiding diaries which were reportedly normal not demonstrating nocturnal polyuria.  We discussed nocturnal polyuria and possible work-up at this time the patient is amenable to follow-up if her symptoms fail to improve or worsen.  She will evaluate and discuss potential sleep study with her primary care physician.  She does not desire p.o. medical intervention and we discussed that anticholinergics or beta 3 agonist are not likely to improve her symptoms.  She is understanding and agreeable with plan of care    Follow Up:   Return if symptoms worsen or fail to improve.    I spent 60 minutes providing clinical care for this patient; including review of patient's chart  and provider documentation, face to face time spent with patient in examination room (obtaining history, performing physical exam, discussing diagnosis and management options), placing orders, and completing patient documentation.     Td Eli MD  [facility] [place]

## 2021-11-22 NOTE — TELEPHONE ENCOUNTER
----- Message from Brisa Lord MD sent at 8/24/2021 12:18 PM EDT -----  Patient has been taking the low dose of long-acting Ambien.  But on her portal message today she said the 10 mg Ambien.  Please find out which one she wants.     consult

## 2022-01-13 DIAGNOSIS — E78.2 MIXED HYPERLIPIDEMIA: ICD-10-CM

## 2022-01-13 RX ORDER — SIMVASTATIN 10 MG
10 TABLET ORAL EVERY EVENING
Qty: 90 TABLET | Refills: 1 | Status: SHIPPED | OUTPATIENT
Start: 2022-01-13 | End: 2022-06-16 | Stop reason: SDUPTHER

## 2022-02-10 RX ORDER — HYDROCORTISONE 25 MG/G
CREAM TOPICAL 2 TIMES DAILY PRN
Qty: 28 G | Refills: 0 | Status: SHIPPED | OUTPATIENT
Start: 2022-02-10

## 2022-03-21 ENCOUNTER — TELEPHONE (OUTPATIENT)
Dept: INTERNAL MEDICINE | Facility: CLINIC | Age: 87
End: 2022-03-21

## 2022-03-21 NOTE — TELEPHONE ENCOUNTER
Caller: Kamille Miles    Relationship: Self    Best call back number: [phone]    What orders are you requesting (i.e. lab or imaging):  BLOOD WORK     In what timeframe would the patient need to come in: THE WEEK BEFORE HER MEDICARE WELLNESS VISIT ON 6-16-22    Where will you [facility] your lab/imaging services: [facility]     Additional notes: PLEASE PUT ORDER INTO MY CHART SO PATIENT WILL KNOW   SHE ALSO WANTS TO MAKE SURE IF SHE COMES IN THE WEEK BEFORE TO GET HER BLOOD WORK WILL THE INSURANCE STILL COVER IT BECAUSE IT IS BEFORE 6-16-22  PLEASE CALL

## 2022-03-21 NOTE — TELEPHONE ENCOUNTER
Yes insurance will cover her labs no matter when they are done.  Please ask her to call us the week before her visit to ask us to put in the lab order then.  If I put it in now, it will be obsolete and cancelled by the time she goes to the lab.

## 2022-04-05 DIAGNOSIS — I10 BENIGN ESSENTIAL HYPERTENSION: ICD-10-CM

## 2022-04-05 RX ORDER — LISINOPRIL 5 MG/1
5 TABLET ORAL DAILY
Qty: 90 TABLET | Refills: 1 | Status: SHIPPED | OUTPATIENT
Start: 2022-04-05 | End: 2022-06-16 | Stop reason: SDUPTHER

## 2022-05-16 ENCOUNTER — OFFICE VISIT (OUTPATIENT)
Dept: INTERNAL MEDICINE | Facility: CLINIC | Age: 87
End: 2022-05-16

## 2022-05-16 VITALS
OXYGEN SATURATION: 97 % | WEIGHT: 119.8 LBS | BODY MASS INDEX: 19.25 KG/M2 | SYSTOLIC BLOOD PRESSURE: 110 MMHG | HEIGHT: 66 IN | TEMPERATURE: 97.8 F | DIASTOLIC BLOOD PRESSURE: 72 MMHG | HEART RATE: 95 BPM

## 2022-05-16 DIAGNOSIS — R10.13 EPIGASTRIC PAIN: Primary | ICD-10-CM

## 2022-05-16 PROCEDURE — 99213 OFFICE O/P EST LOW 20 MIN: CPT | Performed by: INTERNAL MEDICINE

## 2022-05-16 NOTE — PROGRESS NOTES
[facility]     Kamille Miles  1935   [id number]      Patient Care Team:  Brisa Lord MD as PCP - General  Brisa Lord MD as PCP - Family Medicine  Td Eli MD as Consulting Physician (Urology)    Chief Complaint::   Chief Complaint   Patient presents with   • Abdominal Pain        HPI    The patient presents for evaluation of abdominal pain.    GERD  The patient reports a 1 year history of GERD which was revealed via endoscopy. She states that she is managing pretty well, although she is getting what she thinks is mucus in her throat. She does not know if it is draining down or coming up; however, it feels like she needs to clear her throat. The patient wonders if this is attributing to her abdominal symptoms. She states she has gone to an ENT who denied seeing any evidence of drainage, although, the doctor stated it could be from her GERD. She was given nasal drops, at that time, for nasal congestion.     Abdominal pain  Currently, the patient reports that she woke up today with abdominal discomfort and gas. She reports having recent bowel problems, including loose stools. She states that her bowels have moved a little bit today. However, she does not feel like she is fully clearing her bowels, and she is inquiring if she should take something to help. The patient notes that she has taken Pepcid; however, she denies taking anything for acid reflux regularly. She states that she has been drinking Gatorade today, although, she reports that she has not had anything to eat because she was not hungry. The patient confirms that she still has her gallbladder. She reports that she urinates frequently, although, she denies having a bladder infection. The patient adds that she did have blood work and a urinalysis performed a couple of months ago which was all normal. While in [place], she obtained a Cologuard test which was negative. She reports that a doctor there also tested for  any kind of parasite native to the area which was also negative.    She recently returned from [place] as she spends her ware there. She states that she has been back in [place] for approximately 1.5 weeks.     Chronic Conditions:      Patient Active Problem List   Diagnosis   • Urinary frequency   • Light-headed feeling   • Mixed hyperlipidemia   • Senile osteoporosis   • Primary insomnia   • Nocturia   • Status post carpal tunnel release   • Allergic rhinitis   • Ankle edema, bilateral   • External hemorrhoid   • Osteoarthritis   • Sinus headache   • Benign essential hypertension   • Other fatigue   • Bilateral hearing loss due to cerumen impaction   • Heartburn   • Chronic pain of right knee        Past Medical History:   Diagnosis Date   • Headache     cervicogenic -work-up by neurologist in [place], [place]   • Hypertension        No past surgical history on file.    Family History   Problem Relation Age of Onset   • Arrhythmia Mother         Pacemaker placed   • Lymphoma Father    • Breast cancer Neg Hx    • Ovarian cancer Neg Hx        Social History     Socioeconomic History   • Marital status:    Tobacco Use   • Smoking status: Never Smoker   • Smokeless tobacco: Never Used   Substance and Sexual Activity   • Alcohol use: Yes     Alcohol/week: 5.0 standard drinks     Types: 5 Glasses of wine per week     Comment: at dinner   • Drug use: No   • Sexual activity: Yes     Partners: Male       No Known Allergies      Current Outpatient Medications:   •  aspirin 81 MG tablet, Take 1 tablet by mouth Daily., Disp: , Rfl:   •  Azelastine HCl 137 MCG/SPRAY solution, 2 sprays into the nostril(s) as directed by provider 2 (two) times a day. (Patient taking differently: 2 sprays into the nostril(s) as directed by provider. prn), Disp: 1 bottle, Rfl: 5  •  Calcium 500-100 MG-UNIT chewable tablet, Chew 1 tablet Every 4 (Four) Hours As Needed., Disp: , Rfl:   •  hydrocortisone 1 % cream, Apply  topically to  "the appropriate area as directed 2 (Two) Times a Day As Needed for Rash (itching)., Disp: 1 each, Rfl: 0  •  Hydrocortisone, Perianal, (ANUSOL-HC) 2.5 % rectal cream, Insert  into the rectum 2 (Two) Times a Day As Needed for Hemorrhoids., Disp: 28 g, Rfl: 0  •  lisinopril (PRINIVIL,ZESTRIL) 5 MG tablet, TAKE 1 TABLET BY MOUTH DAILY, Disp: 90 tablet, Rfl: 1  •  Probiotic Product (TRUBIOTICS PO), Take 1 capsule by mouth Daily. Taking QOD, Disp: , Rfl:   •  simvastatin (ZOCOR) 10 MG tablet, TAKE 1 TABLET BY MOUTH EVERY EVENING, Disp: 90 tablet, Rfl: 1  •  Ascorbic Acid (VITAMIN C PO), Take  by mouth Daily., Disp: , Rfl:   •  Coenzyme Q10 (CO Q-10) 100 MG capsule, Take 1 capsule by mouth Daily., Disp: , Rfl:   •  Omega-3 Fatty Acids (FISH OIL) 1000 MG capsule capsule, Take 1 capsule by mouth Daily., Disp: , Rfl:   •  Pediatric Multiple Vitamins (CHEWABLE MULTIPLE VITAMINS PO), Take 1 tablet by mouth Daily., Disp: , Rfl:   •  zolpidem (AMBIEN) 10 MG tablet, Take 1/2 to 1 tablet every evening as needed, Disp: 30 tablet, Rfl: 2    Review of Systems   Constitutional: Negative for chills, fatigue and fever.   HENT: Negative for congestion, ear pain and sinus pressure.    Respiratory: Negative for cough, chest tightness, shortness of breath and wheezing.    Cardiovascular: Negative for chest pain and palpitations.   Gastrointestinal: Negative for abdominal pain, blood in stool and constipation.   Skin: Negative for color change.   Allergic/Immunologic: Negative for environmental allergies.   Neurological: Negative for dizziness, speech difficulty and headache.   Psychiatric/Behavioral: Negative for decreased concentration. The patient is not nervous/anxious.         Vital Signs  Vitals:    05/16/22 1227   BP: 110/72   BP Location: Left arm   Patient Position: Sitting   Cuff Size: Adult   Pulse: 95   Temp: 97.8 °F (36.6 °C)   TempSrc: Infrared   SpO2: 97%   Weight: 54.3 kg (119 lb 12.8 oz)   Height: 167.6 cm (65.98\")   PainSc: "   5   PainLoc: Abdomen       Physical Exam  Vitals reviewed.   Constitutional:       Appearance: She is well-developed.   HENT:      Head: Normocephalic and atraumatic.   Cardiovascular:      Rate and Rhythm: Normal rate and regular rhythm.      Heart sounds: Normal heart sounds. No murmur heard.  Pulmonary:      Effort: Pulmonary effort is normal.      Breath sounds: Normal breath sounds.   Abdominal:      Tenderness: There is abdominal tenderness (minimal) in the epigastric area and left upper quadrant. There is no guarding or rebound.   Neurological:      Mental Status: She is alert and oriented to person, place, and time.          Procedures    ACE III MINI             Assessment/Plan:    Diagnoses and all orders for this visit:    1. Epigastric pain (Primary)         1. Abdominal discomfort, nonspecific    - Could be related to GERD. At this point, I think it is reasonable to empirically treat symptoms with famotidine, 20 mg, at bedtime. She will also take something for her bowels. If this condition is not self-limited and persists, she will need to return and see Dr. Lord for further evaluation and consideration of further work-up.    Plan of care reviewed with patient at the conclusion of today's visit. Education was provided regarding diagnosis, management, and any prescribed or recommended OTC medications.Patient verbalizes understanding of and agreement with management plan.         Td Benito MD     Transcribed from ambient dictation for Td Benito MD by Yuly Rose  05/16/22   14:34 EDT    Patient verbalized consent to the visit recording.

## 2022-06-08 ENCOUNTER — LAB (OUTPATIENT)
Dept: LAB | Facility: HOSPITAL | Age: 87
End: 2022-06-08

## 2022-06-08 DIAGNOSIS — I10 BENIGN ESSENTIAL HYPERTENSION: ICD-10-CM

## 2022-06-08 DIAGNOSIS — M81.0 SENILE OSTEOPOROSIS: ICD-10-CM

## 2022-06-08 DIAGNOSIS — E78.2 MIXED HYPERLIPIDEMIA: ICD-10-CM

## 2022-06-08 LAB
25(OH)D3 SERPL-MCNC: 45 NG/ML (ref 30–100)
ALBUMIN SERPL-MCNC: 4.5 G/DL (ref 3.5–5.2)
ALBUMIN UR-MCNC: <1.2 MG/DL
ALBUMIN/GLOB SERPL: 1.4 G/DL
ALP SERPL-CCNC: 69 U/L (ref 39–117)
ALT SERPL W P-5'-P-CCNC: 13 U/L (ref 1–33)
ANION GAP SERPL CALCULATED.3IONS-SCNC: 10.4 MMOL/L (ref 5–15)
AST SERPL-CCNC: 27 U/L (ref 1–32)
BACTERIA UR QL AUTO: NORMAL /HPF
BASOPHILS # BLD AUTO: 0.02 10*3/MM3 (ref 0–0.2)
BASOPHILS NFR BLD AUTO: 0.4 % (ref 0–1.5)
BILIRUB SERPL-MCNC: 0.7 MG/DL (ref 0–1.2)
BILIRUB UR QL STRIP: NEGATIVE
BUN SERPL-MCNC: 15 MG/DL (ref 8–23)
BUN/CREAT SERPL: 15.3 (ref 7–25)
CALCIUM SPEC-SCNC: 9.9 MG/DL (ref 8.6–10.5)
CHLORIDE SERPL-SCNC: 104 MMOL/L (ref 98–107)
CHOLEST SERPL-MCNC: 212 MG/DL (ref 0–200)
CLARITY UR: CLEAR
CO2 SERPL-SCNC: 28.6 MMOL/L (ref 22–29)
COLOR UR: YELLOW
CREAT SERPL-MCNC: 0.98 MG/DL (ref 0.57–1)
CREAT UR-MCNC: 21.1 MG/DL
DEPRECATED RDW RBC AUTO: 39.9 FL (ref 37–54)
EGFRCR SERPLBLD CKD-EPI 2021: 56.7 ML/MIN/1.73
EOSINOPHIL # BLD AUTO: 0.06 10*3/MM3 (ref 0–0.4)
EOSINOPHIL NFR BLD AUTO: 1.3 % (ref 0.3–6.2)
ERYTHROCYTE [DISTWIDTH] IN BLOOD BY AUTOMATED COUNT: 12.2 % (ref 12.3–15.4)
GLOBULIN UR ELPH-MCNC: 3.3 GM/DL
GLUCOSE SERPL-MCNC: 95 MG/DL (ref 65–99)
GLUCOSE UR STRIP-MCNC: NEGATIVE MG/DL
HCT VFR BLD AUTO: 42.3 % (ref 34–46.6)
HDLC SERPL-MCNC: 93 MG/DL (ref 40–60)
HGB BLD-MCNC: 14.3 G/DL (ref 12–15.9)
HGB UR QL STRIP.AUTO: NEGATIVE
HYALINE CASTS UR QL AUTO: NORMAL /LPF
IMM GRANULOCYTES # BLD AUTO: 0.01 10*3/MM3 (ref 0–0.05)
IMM GRANULOCYTES NFR BLD AUTO: 0.2 % (ref 0–0.5)
KETONES UR QL STRIP: NEGATIVE
LDLC SERPL CALC-MCNC: 106 MG/DL (ref 0–100)
LDLC/HDLC SERPL: 1.13 {RATIO}
LEUKOCYTE ESTERASE UR QL STRIP.AUTO: NEGATIVE
LYMPHOCYTES # BLD AUTO: 1.14 10*3/MM3 (ref 0.7–3.1)
LYMPHOCYTES NFR BLD AUTO: 25 % (ref 19.6–45.3)
MCH RBC QN AUTO: 30.8 PG (ref 26.6–33)
MCHC RBC AUTO-ENTMCNC: 33.8 G/DL (ref 31.5–35.7)
MCV RBC AUTO: 91 FL (ref 79–97)
MICROALBUMIN/CREAT UR: NORMAL MG/G{CREAT}
MONOCYTES # BLD AUTO: 0.43 10*3/MM3 (ref 0.1–0.9)
MONOCYTES NFR BLD AUTO: 9.4 % (ref 5–12)
NEUTROPHILS NFR BLD AUTO: 2.9 10*3/MM3 (ref 1.7–7)
NEUTROPHILS NFR BLD AUTO: 63.7 % (ref 42.7–76)
NITRITE UR QL STRIP: NEGATIVE
NRBC BLD AUTO-RTO: 0 /100 WBC (ref 0–0.2)
PH UR STRIP.AUTO: 7 [PH] (ref 5–8)
PLATELET # BLD AUTO: 201 10*3/MM3 (ref 140–450)
PMV BLD AUTO: 12.1 FL (ref 6–12)
POTASSIUM SERPL-SCNC: 4.4 MMOL/L (ref 3.5–5.2)
PROT SERPL-MCNC: 7.8 G/DL (ref 6–8.5)
PROT UR QL STRIP: NEGATIVE
RBC # BLD AUTO: 4.65 10*6/MM3 (ref 3.77–5.28)
RBC # UR STRIP: NORMAL /HPF
REF LAB TEST METHOD: NORMAL
SODIUM SERPL-SCNC: 143 MMOL/L (ref 136–145)
SP GR UR STRIP: <=1.005 (ref 1–1.03)
SQUAMOUS #/AREA URNS HPF: NORMAL /HPF
TRIGL SERPL-MCNC: 71 MG/DL (ref 0–150)
TSH SERPL DL<=0.05 MIU/L-ACNC: 1.65 UIU/ML (ref 0.27–4.2)
UROBILINOGEN UR QL STRIP: NORMAL
VLDLC SERPL-MCNC: 13 MG/DL (ref 5–40)
WBC # UR STRIP: NORMAL /HPF
WBC NRBC COR # BLD: 4.56 10*3/MM3 (ref 3.4–10.8)

## 2022-06-08 PROCEDURE — 80053 COMPREHEN METABOLIC PANEL: CPT

## 2022-06-08 PROCEDURE — 82570 ASSAY OF URINE CREATININE: CPT

## 2022-06-08 PROCEDURE — 80061 LIPID PANEL: CPT

## 2022-06-08 PROCEDURE — 82043 UR ALBUMIN QUANTITATIVE: CPT

## 2022-06-08 PROCEDURE — 81001 URINALYSIS AUTO W/SCOPE: CPT

## 2022-06-08 PROCEDURE — 85025 COMPLETE CBC W/AUTO DIFF WBC: CPT

## 2022-06-08 PROCEDURE — 82306 VITAMIN D 25 HYDROXY: CPT

## 2022-06-08 PROCEDURE — 84443 ASSAY THYROID STIM HORMONE: CPT

## 2022-06-16 ENCOUNTER — OFFICE VISIT (OUTPATIENT)
Dept: INTERNAL MEDICINE | Facility: CLINIC | Age: 87
End: 2022-06-16

## 2022-06-16 VITALS
WEIGHT: 117.8 LBS | SYSTOLIC BLOOD PRESSURE: 130 MMHG | HEIGHT: 66 IN | DIASTOLIC BLOOD PRESSURE: 56 MMHG | TEMPERATURE: 98.3 F | HEART RATE: 74 BPM | OXYGEN SATURATION: 99 % | BODY MASS INDEX: 18.93 KG/M2

## 2022-06-16 DIAGNOSIS — M81.0 SENILE OSTEOPOROSIS: ICD-10-CM

## 2022-06-16 DIAGNOSIS — I10 BENIGN ESSENTIAL HYPERTENSION: Chronic | ICD-10-CM

## 2022-06-16 DIAGNOSIS — Z00.00 MEDICARE ANNUAL WELLNESS VISIT, SUBSEQUENT: Primary | ICD-10-CM

## 2022-06-16 DIAGNOSIS — R20.2 NUMBNESS AND TINGLING OF BOTH FEET: Chronic | ICD-10-CM

## 2022-06-16 DIAGNOSIS — M25.472 ANKLE EDEMA, BILATERAL: ICD-10-CM

## 2022-06-16 DIAGNOSIS — R63.4 ABNORMAL WEIGHT LOSS: Chronic | ICD-10-CM

## 2022-06-16 DIAGNOSIS — K21.9 GASTROESOPHAGEAL REFLUX DISEASE WITHOUT ESOPHAGITIS: Chronic | ICD-10-CM

## 2022-06-16 DIAGNOSIS — N95.9 MENOPAUSAL AND POSTMENOPAUSAL DISORDER: ICD-10-CM

## 2022-06-16 DIAGNOSIS — E78.2 MIXED HYPERLIPIDEMIA: ICD-10-CM

## 2022-06-16 DIAGNOSIS — J30.2 PERENNIAL ALLERGIC RHINITIS WITH SEASONAL VARIATION: Chronic | ICD-10-CM

## 2022-06-16 DIAGNOSIS — J30.89 PERENNIAL ALLERGIC RHINITIS WITH SEASONAL VARIATION: Chronic | ICD-10-CM

## 2022-06-16 DIAGNOSIS — R20.0 NUMBNESS AND TINGLING OF BOTH FEET: Chronic | ICD-10-CM

## 2022-06-16 DIAGNOSIS — E46 PROTEIN-CALORIE MALNUTRITION, UNSPECIFIED SEVERITY: ICD-10-CM

## 2022-06-16 DIAGNOSIS — M25.471 ANKLE EDEMA, BILATERAL: ICD-10-CM

## 2022-06-16 PROCEDURE — 1170F FXNL STATUS ASSESSED: CPT | Performed by: INTERNAL MEDICINE

## 2022-06-16 PROCEDURE — G0439 PPPS, SUBSEQ VISIT: HCPCS | Performed by: INTERNAL MEDICINE

## 2022-06-16 PROCEDURE — 1159F MED LIST DOCD IN RCRD: CPT | Performed by: INTERNAL MEDICINE

## 2022-06-16 RX ORDER — LISINOPRIL 5 MG/1
5 TABLET ORAL DAILY
Qty: 90 TABLET | Refills: 1 | Status: SHIPPED | OUTPATIENT
Start: 2022-06-16

## 2022-06-16 RX ORDER — CYANOCOBALAMIN (VITAMIN B-12) 500 MCG
500 TABLET ORAL DAILY
Qty: 90 TABLET | Refills: 1
Start: 2022-06-16

## 2022-06-16 RX ORDER — SIMVASTATIN 10 MG
10 TABLET ORAL EVERY EVENING
Qty: 90 TABLET | Refills: 1 | Status: SHIPPED | OUTPATIENT
Start: 2022-06-16 | End: 2022-10-21 | Stop reason: SDUPTHER

## 2022-06-16 RX ORDER — MELATONIN
1000 DAILY
Qty: 60 TABLET | Refills: 5
Start: 2022-06-16

## 2022-06-16 RX ORDER — FLUTICASONE PROPIONATE 50 MCG
1 SPRAY, SUSPENSION (ML) NASAL 2 TIMES DAILY
Qty: 16 G | Refills: 5 | Status: SHIPPED | OUTPATIENT
Start: 2022-06-16

## 2022-06-16 RX ORDER — FAMOTIDINE 20 MG/1
20 TABLET, FILM COATED ORAL NIGHTLY
Qty: 90 TABLET | Refills: 3 | Status: SHIPPED | OUTPATIENT
Start: 2022-06-16

## 2022-06-16 NOTE — ASSESSMENT & PLAN NOTE
Continue trying to get some weight-bearing exercises every day. Walk more often. Do exercise classes more often including Silver Sneakers and chiggon. Porfirio Chi and Pilates are also good class choices. Resume taking calcium daily. Add a vitamin D3 1000 unit tablet daily.

## 2022-06-16 NOTE — ASSESSMENT & PLAN NOTE
Use fluticasone nasal spray 1 spray in each nostril twice a day. May still add generic Allegra tablet as needed.

## 2022-06-16 NOTE — PROGRESS NOTES
The ABCs of the Annual Wellness Visit  Initial Medicare Wellness Visit    Chief Complaint   Patient presents with   • Medicare Wellness-subsequent       Subjective   History of Present Illness:  Kamille Miles is a 87 y.o. female who presents for an Initial Medicare Wellness Visit.     Denies any falls. Denies difficulty ambulating.     Urination frequency  She notes has been noticing urine frequency for a year.  No dysuria or hematuria.    Ankle edema  The patient states she has had swelling and dark coloration around her ankles. She notes that she is experiencing numbness and tingling in the bottoms of her feet.  Mild and intermittent.  The patient reports that moving helps and she is not experiencing any pain in her feet at night.     Hypertension  She notes that she has been monitoring her blood pressure at home since 06/01/2022. The patient reports her blood pressure levels to range from 129 mmHg to 115 mmHg, and once was 151 mmHg systolic and the diastolic are constant at 70 mmHg.  She states she is currently taking  5 mg of lisinopril.  The patient's blood pressure is 130/56 mmHg today. She denies any side effects with her medication. She states that she is going to [place] in 09/2022 and then she will be back in 05/2022.      GERD  The patient notes she is experiencing some drainage in the back of her throat.  She states that it is from either her esophagus or nasal cavity. She states she has been having partial looser stools. The patient reports her endoscopy results were normal. She notes she has been using saline which provided little relief for the drainage. The patient states she has been taking Pepcid as needed for her symptoms of indigestion or heartburn.  Pepcid does help.    Vitamin D deficiency  She admits she has not been taking vitamins but she has been taking calcium chews.     Insomnia  She notes she is currently taking 5 mg of Ambien.  She denies any side effects with it.  It does help.   She does not take it every evening.    Kidney function  The patient's creatinine levels are very good however her filtration levels are a little low.     Knee pain  The patient states she had a cortisone injection in her right knee.  It helped a lot.  The patient reports that she would occasionally take Aleve.      Hyperlipidemia  The patient's total  cholesterol level is a little over 200 mg/dL. Her triglycerides are very good at 70 mg/dL HDL is good but her LDL is  slightly over 100 at  106 mg/dL.    Immunizations  The patient states she has received her COVID-19 vaccines including the  boosters as well as her influenza vaccine in  09/2021.        CHRONIC CONDITIONS:    HEALTH RISK ASSESSMENT    Recent Hospitalizations:  No hospitalization(s) within the last year.    Current Medical Providers:  Patient Care Team:  Brisa Lord MD as PCP - General  Brisa Lord MD as PCP - Family Medicine  Td Eli MD as Consulting Physician (Urology)    Smoking Status:  Social History     Tobacco Use   Smoking Status Never Smoker   Smokeless Tobacco Never Used       Alcohol Consumption:  Social History     Substance and Sexual Activity   Alcohol Use Yes   • Alcohol/week: 5.0 standard drinks   • Types: 5 Glasses of wine per week    Comment: at dinner       Depression Screen:   PHQ-2/PHQ-9 Depression Screening 6/16/2022   Retired PHQ-9 Total Score -   Retired Total Score -   Little Interest or Pleasure in Doing Things 0-->not at all   Feeling Down, Depressed or Hopeless 0-->not at all   PHQ-9: Brief Depression Severity Measure Score 0       Fall Risk Screen:  HARLAN Fall Risk Assessment was completed, and patient is at LOW risk for falls.Assessment completed on:6/16/2022    Health Habits and Functional and Cognitive Screening:  Functional & Cognitive Status 6/14/2021   Do you have difficulty preparing food and eating? No   Do you have difficulty bathing yourself, getting dressed or grooming yourself? No   Do you  have difficulty using the toilet? No   Do you have difficulty moving around from place to place? No   Do you have trouble with steps or getting out of a bed or a chair? No   Current Diet Well Balanced Diet   Dental Exam Up to date   Eye Exam Up to date   Exercise (times per week) 4 times per week   Current Exercise Activities Include Walking   Do you need help using the phone?  No   Are you deaf or do you have serious difficulty hearing?  No   Do you need help with transportation? No   Do you need help shopping? No   Do you need help preparing meals?  No   Do you need help with housework?  No   Do you need help with laundry? No   Do you need help taking your medications? No   Do you need help managing money? No   Do you ever drive or ride in a car without wearing a seat belt? No   Have you felt unusual stress, anger or loneliness in the last month? No   Who do you live with? Spouse   If you need help, do you have trouble finding someone available to you? No   Have you been bothered in the last four weeks by sexual problems? No   Do you have difficulty concentrating, remembering or making decisions? No       Does the patient have evidence of cognitive impairment? No    Asprin use counseling:Taking ASA appropriately as indicated    Age-appropriate Screening Schedule:  Refer to the list below for future screening recommendations based on patient's age, sex and/or medical conditions. Orders for these recommended tests are listed in the plan section. The patient has been provided with a written plan.    Health Maintenance   Topic Date Due   • TDAP/TD VACCINES (2 - Tdap) 08/14/2007   • DXA SCAN  01/22/2022   • INFLUENZA VACCINE  10/01/2022   • LIPID PANEL  06/08/2023   • ZOSTER VACCINE  Completed   • MAMMOGRAM  Discontinued        The following portions of the patient's history were reviewed and updated as appropriate: allergies, current medications, past family history, past medical history, past social history, past  surgical history and problem list.    Outpatient Medications Prior to Visit   Medication Sig Dispense Refill   • aspirin 81 MG tablet Take 1 tablet by mouth Daily.     • Hydrocortisone, Perianal, (ANUSOL-HC) 2.5 % rectal cream Insert  into the rectum 2 (Two) Times a Day As Needed for Hemorrhoids. 28 g 0   • Probiotic Product (TRUBIOTICS PO) Take 1 capsule by mouth Daily. Taking QOD     • zolpidem (AMBIEN) 10 MG tablet Take 1/2 to 1 tablet every evening as needed 30 tablet 2   • lisinopril (PRINIVIL,ZESTRIL) 5 MG tablet TAKE 1 TABLET BY MOUTH DAILY 90 tablet 1   • simvastatin (ZOCOR) 10 MG tablet TAKE 1 TABLET BY MOUTH EVERY EVENING 90 tablet 1   • Calcium 500-100 MG-UNIT chewable tablet Chew 1 tablet Every 4 (Four) Hours As Needed.     • hydrocortisone 1 % cream Apply  topically to the appropriate area as directed 2 (Two) Times a Day As Needed for Rash (itching). 1 each 0   • Ascorbic Acid (VITAMIN C PO) Take  by mouth Daily.     • Azelastine HCl 137 MCG/SPRAY solution 2 sprays into the nostril(s) as directed by provider 2 (two) times a day. (Patient taking differently: 2 sprays into the nostril(s) as directed by provider. prn) 1 bottle 5   • Coenzyme Q10 (CO Q-10) 100 MG capsule Take 1 capsule by mouth Daily.     • Omega-3 Fatty Acids (FISH OIL) 1000 MG capsule capsule Take 1 capsule by mouth Daily.     • Pediatric Multiple Vitamins (CHEWABLE MULTIPLE VITAMINS PO) Take 1 tablet by mouth Daily.       No facility-administered medications prior to visit.       Patient Active Problem List   Diagnosis   • Urinary frequency   • Light-headed feeling   • Mixed hyperlipidemia   • Senile osteoporosis   • Primary insomnia   • Nocturia   • Status post carpal tunnel release   • Perennial allergic rhinitis with seasonal variation   • Ankle edema, bilateral   • External hemorrhoid   • Osteoarthritis   • Sinus headache   • Benign essential hypertension   • Other fatigue   • Bilateral hearing loss due to cerumen impaction   •  "Heartburn   • Chronic pain of right knee   • Numbness and tingling of both feet   • Abnormal weight loss   • Gastroesophageal reflux disease without esophagitis       Advanced Care Planning:  ACP discussion was held with the patient during this visit. Patient has an advance directive (not in EMR), copy requested.    Review of Systems   Constitutional: Negative for chills, fatigue and fever.        Denies any falls. Denies difficulty ambulating.      HENT: Negative for congestion, ear pain and sinus pressure.    Respiratory: Negative for cough, chest tightness, shortness of breath and wheezing.    Cardiovascular: Negative for chest pain and palpitations.   Gastrointestinal: Negative for abdominal pain, blood in stool and constipation.   Skin: Negative for color change.   Allergic/Immunologic: Negative for environmental allergies.   Neurological: Negative for dizziness, speech difficulty and headaches.   Psychiatric/Behavioral: Negative for confusion. The patient is not nervous/anxious.        Compared to one year ago, the patient feels her physical health is the same.  Compared to one year ago, the patient feels her mental health is the same.    Reviewed chart for potential of high risk medication in the elderly: yes  Reviewed chart for potential of harmful drug interactions in the elderly:yes    Objective         Vitals:    06/16/22 1002   BP: 130/56   BP Location: Left arm   Patient Position: Sitting   Cuff Size: Adult   Pulse: 74   Temp: 98.3 °F (36.8 °C)   TempSrc: Temporal   SpO2: 99%   Weight: 53.4 kg (117 lb 12.8 oz)   Height: 167.6 cm (65.98\")   PainSc: 0-No pain     BMI is within normal parameters. No other follow-up for BMI required.    Body mass index is 19.02 kg/m².  Discussed the patient's BMI with her. The BMI is in the acceptable range.    Physical Exam  Vitals and nursing note reviewed.   Constitutional:       Appearance: She is well-developed.      Comments: Low weight.   HENT:      Head: " Normocephalic.   Eyes:      Conjunctiva/sclera: Conjunctivae normal.      Pupils: Pupils are equal, round, and reactive to light.   Neck:      Thyroid: No thyromegaly.   Cardiovascular:      Rate and Rhythm: Normal rate and regular rhythm.      Heart sounds: Normal heart sounds.      Comments: Varicose veins in the ankles  Pulmonary:      Effort: Pulmonary effort is normal.      Breath sounds: Normal breath sounds. No wheezing.   Chest:   Breasts:      Right: No inverted nipple, mass, nipple discharge, skin change or tenderness.      Left: No inverted nipple, mass, nipple discharge, skin change or tenderness.       Abdominal:      General: Bowel sounds are normal.      Palpations: Abdomen is soft.      Tenderness: There is no abdominal tenderness.   Musculoskeletal:         General: No tenderness. Normal range of motion.      Cervical back: Normal range of motion and neck supple.   Lymphadenopathy:      Cervical: No cervical adenopathy.   Skin:     General: Skin is warm and dry.      Findings: No rash.   Neurological:      Mental Status: She is alert and oriented to person, place, and time.      Cranial Nerves: No cranial nerve deficit.      Sensory: No sensory deficit.      Coordination: Coordination normal.      Gait: Gait normal.   Psychiatric:         Speech: Speech normal.         Behavior: Behavior normal.         Thought Content: Thought content normal.         Judgment: Judgment normal.         Lab Results   Component Value Date    TRIG 71 06/08/2022    HDL 93 (H) 06/08/2022     (H) 06/08/2022    VLDL 13 06/08/2022        Assessment & Plan   Medicare Risks and Personalized Health Plan  CMS Preventative Services Quick Reference  Cardiovascular risk    The above risks/problems have been discussed with the patient.  Pertinent information has been shared with the patient in the After Visit Summary.  Follow up plans and orders are seen below in the Assessment/Plan Section.  Patient Instructions   Problem  List Items Addressed This Visit        Allergies and Adverse Reactions    Perennial allergic rhinitis with seasonal variation (Chronic)    Overview                  Current Assessment & Plan     Use fluticasone nasal spray 1 spray in each nostril twice a day. May still add generic Allegra tablet as needed.              Cardiac and Vasculature    Benign essential hypertension (Chronic)    Overview     Taking 5 mg lisinopril daily.             Current Assessment & Plan     -Continue current dose of lisinopril daily.   -Continue to avoid salt in the diet.             Relevant Medications    lisinopril (PRINIVIL,ZESTRIL) 5 MG tablet    Other Relevant Orders    CBC & Differential (Completed)    Comprehensive Metabolic Panel (Completed)    Urinalysis With Microscopic - Urine, Clean Catch (Completed)    Microalbumin / Creatinine Urine Ratio - Urine, Clean Catch (Completed)    Mixed hyperlipidemia    Overview     Taking simvastatin every evening.             Current Assessment & Plan     -Continue simvastatin every evening.   -Continue low-fat, low-sugar diet.           Relevant Medications    simvastatin (ZOCOR) 10 MG tablet    Other Relevant Orders    Lipid Panel (Completed)    TSH (Completed)       Endocrine and Metabolic    Abnormal weight loss (Chronic)    Current Assessment & Plan     We discussed adding more protein to the diet. She should get protein 3 times a day. She was also given a handout on protein content of different foods. She was advised not to skip lunch.              Gastrointestinal Abdominal     Gastroesophageal reflux disease without esophagitis (Chronic)    Current Assessment & Plan     She will take famotidine every evening. Continue to avoid eating close to bedtime and continue to avoid large meals.           Relevant Medications    famotidine (PEPCID) 20 MG tablet       Musculoskeletal and Injuries    Senile osteoporosis    Overview     1/22/2020 DEXA scan showed osteopenia of the spine (T score  -2.3) and mild osteopenia of the hip (T score -1.3).  T-scores were stable.    Continue weight bearing exercises every day. Continue calcium and vitamin D.           Relevant Orders    Vitamin D 25 Hydroxy (Completed)    Ankle edema, bilateral    Current Assessment & Plan     Continue to elevate feet when sitting at home. Avoid salt in the diet. Drink less sodas.              Symptoms and Signs    Numbness and tingling of both feet (Chronic)    Current Assessment & Plan     Take a B12 500 mcg tablet daily. Let us know if symptoms seem to be worsening.             Other Visit Diagnoses     Medicare annual wellness visit, subsequent    -  Primary    Protein-calorie malnutrition, unspecified severity (HCC)        Menopausal and postmenopausal disorder        Relevant Orders    DEXA Bone Density Axial           Follow Up:  Return in about 1 year (around 6/16/2023) for Medicare Wellness.     An After Visit Summary and PPPS were given to the patient.    .Transcribed from ambient dictation for Brisa Lord MD by Sherice Andrew.  06/16/22   12:39 EDT    Patient verbalized consent to the visit recording.

## 2022-06-16 NOTE — ASSESSMENT & PLAN NOTE
She will take famotidine every evening. Continue to avoid eating close to bedtime and continue to avoid large meals.

## 2022-06-16 NOTE — ASSESSMENT & PLAN NOTE
We discussed adding more protein to the diet. She should get protein 3 times a day. She was also given a handout on protein content of different foods. She was advised not to skip lunch.

## 2022-06-18 NOTE — PATIENT INSTRUCTIONS
Patient Instructions   Problem List Items Addressed This Visit          Allergies and Adverse Reactions    Perennial allergic rhinitis with seasonal variation (Chronic)    Overview                  Current Assessment & Plan     Use fluticasone nasal spray 1 spray in each nostril twice a day. May still add generic Allegra tablet as needed.              Cardiac and Vasculature    Benign essential hypertension (Chronic)    Overview     Taking 5 mg lisinopril daily.             Current Assessment & Plan     -Continue current dose of lisinopril daily.   -Continue to avoid salt in the diet.             Relevant Medications    lisinopril (PRINIVIL,ZESTRIL) 5 MG tablet    Other Relevant Orders    CBC & Differential (Completed)    Comprehensive Metabolic Panel (Completed)    Urinalysis With Microscopic - Urine, Clean Catch (Completed)    Microalbumin / Creatinine Urine Ratio - Urine, Clean Catch (Completed)    Mixed hyperlipidemia    Overview     Taking simvastatin every evening.             Current Assessment & Plan     -Continue simvastatin every evening.   -Continue low-fat, low-sugar diet.           Relevant Medications    simvastatin (ZOCOR) 10 MG tablet    Other Relevant Orders    Lipid Panel (Completed)    TSH (Completed)       Endocrine and Metabolic    Abnormal weight loss (Chronic)    Current Assessment & Plan     We discussed adding more protein to the diet. She should get protein 3 times a day. She was also given a handout on protein content of different foods. She was advised not to skip lunch.              Gastrointestinal Abdominal     Gastroesophageal reflux disease without esophagitis (Chronic)    Current Assessment & Plan     She will take famotidine every evening. Continue to avoid eating close to bedtime and continue to avoid large meals.           Relevant Medications    famotidine (PEPCID) 20 MG tablet       Musculoskeletal and Injuries    Senile osteoporosis    Overview     1/22/2020 DEXA scan showed  osteopenia of the spine (T score -2.3) and mild osteopenia of the hip (T score -1.3).  T-scores were stable.    Continue weight bearing exercises every day. Continue calcium and vitamin D.           Relevant Orders    Vitamin D 25 Hydroxy (Completed)    Ankle edema, bilateral    Current Assessment & Plan     Continue to elevate feet when sitting at home. Avoid salt in the diet. Drink less sodas.              Symptoms and Signs    Numbness and tingling of both feet (Chronic)    Current Assessment & Plan     Take a B12 500 mcg tablet daily. Let us know if symptoms seem to be worsening.                 Other Visit Diagnoses       Medicare annual wellness visit, subsequent    -  Primary    Protein-calorie malnutrition, unspecified severity (HCC)        Menopausal and postmenopausal disorder        Relevant Orders    DEXA Bone Density Axial         Fall Prevention in the Home, Adult  Falls can cause injuries and can affect people from all age groups. There are many simple things that you can do to make your home safe and to help prevent falls. Ask for help when making these changes, if needed.  What actions can I take to prevent falls?  General instructions  Use good lighting in all rooms. Replace any light bulbs that burn out.  Turn on lights if it is dark. Use night-lights.  Place frequently used items in easy-to-reach places. Lower the shelves around your home if necessary.  Set up furniture so that there are clear paths around it. Avoid moving your furniture around.  Remove throw rugs and other tripping hazards from the floor.  Avoid walking on wet floors.  Fix any uneven floor surfaces.  Add color or contrast paint or tape to grab bars and handrails in your home. Place contrasting color strips on the first and last steps of stairways.  When you use a stepladder, make sure that it is completely opened and that the sides are firmly locked. Have someone hold the ladder while you are using it. Do not climb a closed  stepladder.  Be aware of any and all pets.  What can I do in the bathroom?         Keep the floor dry. Immediately clean up any water that spills onto the floor.  Remove soap buildup in the tub or shower on a regular basis.  Use non-skid mats or decals on the floor of the tub or shower.  Attach bath mats securely with double-sided, non-slip rug tape.  If you need to sit down while you are in the shower, use a plastic, non-slip stool.  Install grab bars by the toilet and in the tub and shower. Do not use towel bars as grab bars.  What can I do in the bedroom?  Make sure that a bedside light is easy to reach.  Do not use oversized bedding that drapes onto the floor.  Have a firm chair that has side arms to use for getting dressed.  What can I do in the kitchen?  Clean up any spills right away.  If you need to reach for something above you, use a sturdy step stool that has a grab bar.  Keep electrical cables out of the way.  Do not use floor polish or wax that makes floors slippery. If you must use wax, make sure that it is non-skid floor wax.  What can I do in the stairways?  Do not leave any items on the stairs.  Make sure that you have a light switch at the top of the stairs and the bottom of the stairs. Have them installed if you do not have them.  Make sure that there are handrails on both sides of the stairs. Fix handrails that are broken or loose. Make sure that handrails are as long as the stairways.  Install non-slip stair treads on all stairs in your home.  Avoid having throw rugs at the top or bottom of stairways, or secure the rugs with carpet tape to prevent them from moving.  Choose a carpet design that does not hide the edge of steps on the stairway.  Check any carpeting to make sure that it is firmly attached to the stairs. Fix any carpet that is loose or worn.  What can I do on the outside of my home?  Use bright outdoor lighting.  Regularly repair the edges of walkways and driveways and fix any  cracks.  Remove high doorway thresholds.  Trim any shrubbery on the main path into your home.  Regularly check that handrails are securely fastened and in good repair. Both sides of any steps should have handrails.  Install guardrails along the edges of any raised decks or porches.  Clear walkways of debris and clutter, including tools and rocks.  Have leaves, snow, and ice cleared regularly.  Use sand or salt on walkways during winter months.  In the garage, clean up any spills right away, including grease or oil spills.  What other actions can I take?  Wear closed-toe shoes that fit well and support your feet. Wear shoes that have rubber soles or low heels.  Use mobility aids as needed, such as canes, walkers, scooters, and crutches.  Review your medicines with your health care provider. Some medicines can cause dizziness or changes in blood pressure, which increase your risk of falling.  Talk with your health care provider about other ways that you can decrease your risk of falls. This may include working with a physical therapist or  to improve your strength, balance, and endurance.  Where to find more information  Centers for Disease Control and Prevention, [place]: https://www.cdc.gov  [employer] [place] [employer]: https://xc3zlmf.christopher.nih.gov  Contact a health care provider if:  You are afraid of falling at home.  You feel weak, drowsy, or dizzy at home.  You fall at home.  Summary  There are many simple things that you can do to make your home safe and to help prevent falls.  Ways to make your home safe include removing tripping hazards and installing grab bars in the bathroom.  Ask for help when making these changes in your home.  This information is not intended to replace advice given to you by your health care provider. Make sure you discuss any questions you have with your health care provider.  Document Revised: 11/30/2018 Document Reviewed: 08/02/2018  Elsevier Patient Education © 2021 [employer]

## 2022-06-22 ENCOUNTER — HOSPITAL ENCOUNTER (OUTPATIENT)
Dept: BONE DENSITY | Facility: HOSPITAL | Age: 87
Discharge: HOME | End: 2022-06-22
Admitting: INTERNAL MEDICINE

## 2022-06-22 DIAGNOSIS — N95.9 MENOPAUSAL AND POSTMENOPAUSAL DISORDER: ICD-10-CM

## 2022-06-22 PROCEDURE — 77080 DXA BONE DENSITY AXIAL: CPT

## 2022-07-25 RX ORDER — KETOCONAZOLE 20 MG/G
1 CREAM TOPICAL DAILY
Qty: 60 G | Refills: 1 | Status: SHIPPED | OUTPATIENT
Start: 2022-07-25

## 2022-08-10 ENCOUNTER — TELEPHONE (OUTPATIENT)
Dept: INTERNAL MEDICINE | Facility: CLINIC | Age: 87
End: 2022-08-10

## 2022-08-10 NOTE — TELEPHONE ENCOUNTER
I sent paxlovid to the pharmacy.  She should stop the simvastatin for the 5 days she is on it then resume right after.  She may also use nasal sprays, Mucinex, guaifenesin cough syrup, Tylenol, as needed.  Drink lots of fluids

## 2022-08-10 NOTE — TELEPHONE ENCOUNTER
Caller: Kamille Miles    Relationship to patient: Self    Best call back number: [phone]    Date of positive COVID19 test: 8.10.22    Date if possible COVID19 exposure: [employer]IOR TRAVELING 8.6.22    COVID19 symptoms: SORE THROAT, RUNNING NOSE, AND A LITTLE ACHEY    What is the patients preferred pharmacy:   [id number] [address] AT [facility] [address] . [facility] [phone] [facility] [phone] FX    PLEASE CALL TO ADVISE AND LET PATIENT KNOW IF A PRESCRIPTION HAS BEEN CALLED IN.    THANK YOU.

## 2022-08-11 ENCOUNTER — APPOINTMENT (OUTPATIENT)
Dept: GENERAL RADIOLOGY | Facility: HOSPITAL | Age: 87
End: 2022-08-11

## 2022-08-11 ENCOUNTER — APPOINTMENT (OUTPATIENT)
Dept: MRI IMAGING | Facility: HOSPITAL | Age: 87
End: 2022-08-11

## 2022-08-11 ENCOUNTER — APPOINTMENT (OUTPATIENT)
Dept: CT IMAGING | Facility: HOSPITAL | Age: 87
End: 2022-08-11

## 2022-08-11 ENCOUNTER — APPOINTMENT (OUTPATIENT)
Dept: CARDIOLOGY | Facility: HOSPITAL | Age: 87
End: 2022-08-11

## 2022-08-11 ENCOUNTER — HOSPITAL ENCOUNTER (OUTPATIENT)
Facility: HOSPITAL | Age: 87
Setting detail: OBSERVATION
Discharge: HOME | End: 2022-08-12
Attending: EMERGENCY MEDICINE | Admitting: FAMILY MEDICINE

## 2022-08-11 DIAGNOSIS — G45.9 TIA (TRANSIENT ISCHEMIC ATTACK): Primary | ICD-10-CM

## 2022-08-11 DIAGNOSIS — R47.1 DYSARTHRIA: ICD-10-CM

## 2022-08-11 DIAGNOSIS — E78.2 MIXED HYPERLIPIDEMIA: ICD-10-CM

## 2022-08-11 LAB
ALT SERPL W P-5'-P-CCNC: 11 U/L (ref 1–33)
APTT PPP: 30.3 SECONDS (ref 22–39)
AST SERPL-CCNC: 20 U/L (ref 1–32)
BASOPHILS # BLD AUTO: 0.02 10*3/MM3 (ref 0–0.2)
BASOPHILS NFR BLD AUTO: 0.4 % (ref 0–1.5)
BH CV ECHO MEAS - AO MAX PG: 9.2 MMHG
BH CV ECHO MEAS - AO MEAN PG: 4.1 MMHG
BH CV ECHO MEAS - AO ROOT DIAM: 3.1 CM
BH CV ECHO MEAS - AO V2 MAX: 152 CM/SEC
BH CV ECHO MEAS - AO V2 VTI: 27.6 CM
BH CV ECHO MEAS - AVA(I,D): 2.06 CM2
BH CV ECHO MEAS - EDV(CUBED): 83.5 ML
BH CV ECHO MEAS - EDV(MOD-SP2): 96.4 ML
BH CV ECHO MEAS - EDV(MOD-SP4): 91.1 ML
BH CV ECHO MEAS - EF(MOD-BP): 58.1 %
BH CV ECHO MEAS - EF(MOD-SP2): 56.5 %
BH CV ECHO MEAS - EF(MOD-SP4): 62.5 %
BH CV ECHO MEAS - ESV(CUBED): 44.3 ML
BH CV ECHO MEAS - ESV(MOD-SP2): 41.9 ML
BH CV ECHO MEAS - ESV(MOD-SP4): 34.2 ML
BH CV ECHO MEAS - FS: 19 %
BH CV ECHO MEAS - IVS/LVPW: 1.2 CM
BH CV ECHO MEAS - IVSD: 0.97 CM
BH CV ECHO MEAS - LA DIMENSION: 2.9 CM
BH CV ECHO MEAS - LAT PEAK E' VEL: 10.6 CM/SEC
BH CV ECHO MEAS - LV MASS(C)D: 124.5 GRAMS
BH CV ECHO MEAS - LV MAX PG: 4.1 MMHG
BH CV ECHO MEAS - LV MEAN PG: 1.89 MMHG
BH CV ECHO MEAS - LV V1 MAX: 101.5 CM/SEC
BH CV ECHO MEAS - LV V1 VTI: 23 CM
BH CV ECHO MEAS - LVIDD: 4.4 CM
BH CV ECHO MEAS - LVIDS: 3.5 CM
BH CV ECHO MEAS - LVOT AREA: 2.47 CM2
BH CV ECHO MEAS - LVOT DIAM: 1.77 CM
BH CV ECHO MEAS - LVPWD: 0.81 CM
BH CV ECHO MEAS - MED PEAK E' VEL: 6.1 CM/SEC
BH CV ECHO MEAS - MV A MAX VEL: 75.8 CM/SEC
BH CV ECHO MEAS - MV DEC SLOPE: 295.7 CM/SEC2
BH CV ECHO MEAS - MV DEC TIME: 0.19 MSEC
BH CV ECHO MEAS - MV E MAX VEL: 65.1 CM/SEC
BH CV ECHO MEAS - MV E/A: 0.86
BH CV ECHO MEAS - MV MAX PG: 3.1 MMHG
BH CV ECHO MEAS - MV MEAN PG: 1.57 MMHG
BH CV ECHO MEAS - MV P1/2T: 83.3 MSEC
BH CV ECHO MEAS - MV V2 VTI: 26.9 CM
BH CV ECHO MEAS - MVA(P1/2T): 2.6 CM2
BH CV ECHO MEAS - MVA(VTI): 2.11 CM2
BH CV ECHO MEAS - PA ACC TIME: 0.13 SEC
BH CV ECHO MEAS - PA PR(ACCEL): 20.4 MMHG
BH CV ECHO MEAS - SV(LVOT): 56.7 ML
BH CV ECHO MEAS - SV(MOD-SP2): 54.5 ML
BH CV ECHO MEAS - SV(MOD-SP4): 56.9 ML
BH CV ECHO MEAS - TAPSE (>1.6): 1.1 CM
BH CV ECHO MEAS - TR MAX PG: 32.8 MMHG
BH CV ECHO MEAS - TR MAX VEL: 286 CM/SEC
BH CV ECHO MEASUREMENTS AVERAGE E/E' RATIO: 7.8
BH CV VAS BP LEFT ARM: NORMAL MMHG
BH CV XLRA - RV BASE: 4.1 CM
BH CV XLRA - RV LENGTH: 6.3 CM
BH CV XLRA - RV MID: 2.6 CM
BH CV XLRA - TDI S': 18.2 CM/SEC
BILIRUB UR QL STRIP: NEGATIVE
BUN BLDA-MCNC: 10 MG/DL (ref 8–26)
CA-I BLDA-SCNC: 1.2 MMOL/L (ref 1.2–1.32)
CHLORIDE BLDA-SCNC: 105 MMOL/L (ref 98–109)
CLARITY UR: CLEAR
CO2 BLDA-SCNC: 25 MMOL/L (ref 24–29)
COLOR UR: YELLOW
CREAT BLDA-MCNC: 0.8 MG/DL (ref 0.6–1.3)
DEPRECATED RDW RBC AUTO: 44.3 FL (ref 37–54)
EGFRCR SERPLBLD CKD-EPI 2021: 72.3 ML/MIN/1.73
EOSINOPHIL # BLD AUTO: 0.01 10*3/MM3 (ref 0–0.4)
EOSINOPHIL NFR BLD AUTO: 0.2 % (ref 0.3–6.2)
ERYTHROCYTE [DISTWIDTH] IN BLOOD BY AUTOMATED COUNT: 13.2 % (ref 12.3–15.4)
FLUAV RNA RESP QL NAA+PROBE: NOT DETECTED
FLUBV RNA RESP QL NAA+PROBE: NOT DETECTED
GLUCOSE BLDC GLUCOMTR-MCNC: 104 MG/DL (ref 70–130)
GLUCOSE BLDC GLUCOMTR-MCNC: 117 MG/DL (ref 70–130)
GLUCOSE BLDC GLUCOMTR-MCNC: 99 MG/DL (ref 70–130)
GLUCOSE UR STRIP-MCNC: NEGATIVE MG/DL
HCT VFR BLD AUTO: 39 % (ref 34–46.6)
HCT VFR BLDA CALC: 38 % (ref 38–51)
HGB BLD-MCNC: 13.4 G/DL (ref 12–15.9)
HGB BLDA-MCNC: 12.9 G/DL (ref 12–17)
HGB UR QL STRIP.AUTO: NEGATIVE
HOLD SPECIMEN: NORMAL
IMM GRANULOCYTES # BLD AUTO: 0.01 10*3/MM3 (ref 0–0.05)
IMM GRANULOCYTES NFR BLD AUTO: 0.2 % (ref 0–0.5)
KETONES UR QL STRIP: NEGATIVE
LEUKOCYTE ESTERASE UR QL STRIP.AUTO: NEGATIVE
LYMPHOCYTES # BLD AUTO: 2.2 10*3/MM3 (ref 0.7–3.1)
LYMPHOCYTES NFR BLD AUTO: 39.7 % (ref 19.6–45.3)
MAXIMAL PREDICTED HEART RATE: 135 BPM
MCH RBC QN AUTO: 31.5 PG (ref 26.6–33)
MCHC RBC AUTO-ENTMCNC: 34.4 G/DL (ref 31.5–35.7)
MCV RBC AUTO: 91.8 FL (ref 79–97)
MONOCYTES # BLD AUTO: 0.9 10*3/MM3 (ref 0.1–0.9)
MONOCYTES NFR BLD AUTO: 16.2 % (ref 5–12)
NEUTROPHILS NFR BLD AUTO: 2.4 10*3/MM3 (ref 1.7–7)
NEUTROPHILS NFR BLD AUTO: 43.3 % (ref 42.7–76)
NITRITE UR QL STRIP: NEGATIVE
NRBC BLD AUTO-RTO: 0 /100 WBC (ref 0–0.2)
PH UR STRIP.AUTO: 7 [PH] (ref 5–8)
PLATELET # BLD AUTO: 180 10*3/MM3 (ref 140–450)
PMV BLD AUTO: 11 FL (ref 6–12)
POTASSIUM BLDA-SCNC: 3.4 MMOL/L (ref 3.5–4.9)
PROT UR QL STRIP: NEGATIVE
QT INTERVAL: 394 MS
QTC INTERVAL: 439 MS
RBC # BLD AUTO: 4.25 10*6/MM3 (ref 3.77–5.28)
RSV RNA NPH QL NAA+NON-PROBE: NOT DETECTED
SARS-COV-2 RNA RESP QL NAA+PROBE: DETECTED
SODIUM BLD-SCNC: 141 MMOL/L (ref 138–146)
SP GR UR STRIP: 1.05 (ref 1–1.03)
STRESS TARGET HR: 115 BPM
TROPONIN T SERPL-MCNC: <0.01 NG/ML (ref 0–0.03)
UROBILINOGEN UR QL STRIP: ABNORMAL
WBC NRBC COR # BLD: 5.54 10*3/MM3 (ref 3.4–10.8)
WHOLE BLOOD HOLD COAG: NORMAL
WHOLE BLOOD HOLD SPECIMEN: NORMAL

## 2022-08-11 PROCEDURE — 96372 THER/PROPH/DIAG INJ SC/IM: CPT

## 2022-08-11 PROCEDURE — 85730 THROMBOPLASTIN TIME PARTIAL: CPT | Performed by: EMERGENCY MEDICINE

## 2022-08-11 PROCEDURE — 85014 HEMATOCRIT: CPT

## 2022-08-11 PROCEDURE — 70498 CT ANGIOGRAPHY NECK: CPT

## 2022-08-11 PROCEDURE — 71045 X-RAY EXAM CHEST 1 VIEW: CPT

## 2022-08-11 PROCEDURE — 70450 CT HEAD/BRAIN W/O DYE: CPT

## 2022-08-11 PROCEDURE — G0378 HOSPITAL OBSERVATION PER HR: HCPCS

## 2022-08-11 PROCEDURE — 97161 PT EVAL LOW COMPLEX 20 MIN: CPT

## 2022-08-11 PROCEDURE — 80047 BASIC METABLC PNL IONIZED CA: CPT

## 2022-08-11 PROCEDURE — 99220 PR INITIAL OBSERVATION CARE/DAY 70 MINUTES: CPT | Performed by: INTERNAL MEDICINE

## 2022-08-11 PROCEDURE — 81003 URINALYSIS AUTO W/O SCOPE: CPT

## 2022-08-11 PROCEDURE — 25010000002 ENOXAPARIN PER 10 MG: Performed by: INTERNAL MEDICINE

## 2022-08-11 PROCEDURE — 0 IOPAMIDOL PER 1 ML: Performed by: EMERGENCY MEDICINE

## 2022-08-11 PROCEDURE — 84460 ALANINE AMINO (ALT) (SGPT): CPT | Performed by: EMERGENCY MEDICINE

## 2022-08-11 PROCEDURE — 99284 EMERGENCY DEPT VISIT MOD MDM: CPT

## 2022-08-11 PROCEDURE — 82962 GLUCOSE BLOOD TEST: CPT

## 2022-08-11 PROCEDURE — 0042T HC CT CEREBRAL PERFUSION W/WO CONTRAST: CPT

## 2022-08-11 PROCEDURE — 93306 TTE W/DOPPLER COMPLETE: CPT

## 2022-08-11 PROCEDURE — C9803 HOPD COVID-19 SPEC COLLECT: HCPCS

## 2022-08-11 PROCEDURE — 93005 ELECTROCARDIOGRAM TRACING: CPT | Performed by: EMERGENCY MEDICINE

## 2022-08-11 PROCEDURE — 99204 OFFICE O/P NEW MOD 45 MIN: CPT | Performed by: STUDENT IN AN ORGANIZED HEALTH CARE EDUCATION/TRAINING PROGRAM

## 2022-08-11 PROCEDURE — 70496 CT ANGIOGRAPHY HEAD: CPT

## 2022-08-11 PROCEDURE — 84450 TRANSFERASE (AST) (SGOT): CPT | Performed by: EMERGENCY MEDICINE

## 2022-08-11 PROCEDURE — 87637 SARSCOV2&INF A&B&RSV AMP PRB: CPT | Performed by: INTERNAL MEDICINE

## 2022-08-11 PROCEDURE — 84484 ASSAY OF TROPONIN QUANT: CPT | Performed by: EMERGENCY MEDICINE

## 2022-08-11 PROCEDURE — 70551 MRI BRAIN STEM W/O DYE: CPT

## 2022-08-11 PROCEDURE — 92523 SPEECH SOUND LANG COMPREHEN: CPT

## 2022-08-11 PROCEDURE — 85025 COMPLETE CBC W/AUTO DIFF WBC: CPT | Performed by: EMERGENCY MEDICINE

## 2022-08-11 RX ORDER — EPINEPHRINE 1 MG/ML
0.3 INJECTION, SOLUTION INTRAMUSCULAR; SUBCUTANEOUS ONCE AS NEEDED
Status: DISCONTINUED | OUTPATIENT
Start: 2022-08-11 | End: 2022-08-12 | Stop reason: HOSPADM

## 2022-08-11 RX ORDER — SODIUM CHLORIDE 0.9 % (FLUSH) 0.9 %
10 SYRINGE (ML) INJECTION EVERY 12 HOURS SCHEDULED
Status: DISCONTINUED | OUTPATIENT
Start: 2022-08-11 | End: 2022-08-12 | Stop reason: HOSPADM

## 2022-08-11 RX ORDER — FLUTICASONE PROPIONATE 50 MCG
1 SPRAY, SUSPENSION (ML) NASAL 2 TIMES DAILY
Status: DISCONTINUED | OUTPATIENT
Start: 2022-08-11 | End: 2022-08-12 | Stop reason: HOSPADM

## 2022-08-11 RX ORDER — METHYLPREDNISOLONE SODIUM SUCCINATE 125 MG/2ML
125 INJECTION, POWDER, LYOPHILIZED, FOR SOLUTION INTRAMUSCULAR; INTRAVENOUS ONCE AS NEEDED
Status: DISCONTINUED | OUTPATIENT
Start: 2022-08-11 | End: 2022-08-12 | Stop reason: HOSPADM

## 2022-08-11 RX ORDER — BEBTELOVIMAB 87.5 MG/ML
175 INJECTION, SOLUTION INTRAVENOUS ONCE
Status: DISCONTINUED | OUTPATIENT
Start: 2022-08-11 | End: 2022-08-12

## 2022-08-11 RX ORDER — POLYETHYLENE GLYCOL 3350 17 G/17G
17 POWDER, FOR SOLUTION ORAL DAILY PRN
Status: DISCONTINUED | OUTPATIENT
Start: 2022-08-11 | End: 2022-08-12 | Stop reason: HOSPADM

## 2022-08-11 RX ORDER — SODIUM CHLORIDE 9 MG/ML
30 INJECTION, SOLUTION INTRAVENOUS ONCE
Status: DISCONTINUED | OUTPATIENT
Start: 2022-08-11 | End: 2022-08-12 | Stop reason: HOSPADM

## 2022-08-11 RX ORDER — ACETAMINOPHEN 325 MG/1
650 TABLET ORAL EVERY 4 HOURS PRN
Status: DISCONTINUED | OUTPATIENT
Start: 2022-08-11 | End: 2022-08-12 | Stop reason: HOSPADM

## 2022-08-11 RX ORDER — BISACODYL 5 MG/1
5 TABLET, DELAYED RELEASE ORAL DAILY PRN
Status: DISCONTINUED | OUTPATIENT
Start: 2022-08-11 | End: 2022-08-12 | Stop reason: HOSPADM

## 2022-08-11 RX ORDER — ACETAMINOPHEN 650 MG/1
650 SUPPOSITORY RECTAL EVERY 4 HOURS PRN
Status: DISCONTINUED | OUTPATIENT
Start: 2022-08-11 | End: 2022-08-12 | Stop reason: HOSPADM

## 2022-08-11 RX ORDER — ASPIRIN 300 MG/1
300 SUPPOSITORY RECTAL DAILY
Status: DISCONTINUED | OUTPATIENT
Start: 2022-08-11 | End: 2022-08-12

## 2022-08-11 RX ORDER — ATORVASTATIN CALCIUM 40 MG/1
80 TABLET, FILM COATED ORAL NIGHTLY
Status: DISCONTINUED | OUTPATIENT
Start: 2022-08-11 | End: 2022-08-12

## 2022-08-11 RX ORDER — SODIUM CHLORIDE 0.9 % (FLUSH) 0.9 %
10 SYRINGE (ML) INJECTION AS NEEDED
Status: DISCONTINUED | OUTPATIENT
Start: 2022-08-11 | End: 2022-08-12 | Stop reason: HOSPADM

## 2022-08-11 RX ORDER — L.ACID,PARA/B.BIFIDUM/S.THERM 8B CELL
1 CAPSULE ORAL DAILY
Status: DISCONTINUED | OUTPATIENT
Start: 2022-08-11 | End: 2022-08-12 | Stop reason: HOSPADM

## 2022-08-11 RX ORDER — CLOPIDOGREL BISULFATE 75 MG/1
300 TABLET ORAL ONCE
Status: COMPLETED | OUTPATIENT
Start: 2022-08-11 | End: 2022-08-11

## 2022-08-11 RX ORDER — DIPHENHYDRAMINE HYDROCHLORIDE 50 MG/ML
50 INJECTION INTRAMUSCULAR; INTRAVENOUS ONCE AS NEEDED
Status: DISCONTINUED | OUTPATIENT
Start: 2022-08-11 | End: 2022-08-12 | Stop reason: HOSPADM

## 2022-08-11 RX ORDER — ACETAMINOPHEN 160 MG/5ML
650 SOLUTION ORAL EVERY 4 HOURS PRN
Status: DISCONTINUED | OUTPATIENT
Start: 2022-08-11 | End: 2022-08-12 | Stop reason: HOSPADM

## 2022-08-11 RX ORDER — ONDANSETRON 4 MG/1
4 TABLET, FILM COATED ORAL EVERY 6 HOURS PRN
Status: DISCONTINUED | OUTPATIENT
Start: 2022-08-11 | End: 2022-08-12 | Stop reason: HOSPADM

## 2022-08-11 RX ORDER — LISINOPRIL 5 MG/1
5 TABLET ORAL DAILY
Status: DISCONTINUED | OUTPATIENT
Start: 2022-08-11 | End: 2022-08-11

## 2022-08-11 RX ORDER — AMOXICILLIN 250 MG
2 CAPSULE ORAL 2 TIMES DAILY
Status: DISCONTINUED | OUTPATIENT
Start: 2022-08-11 | End: 2022-08-12 | Stop reason: HOSPADM

## 2022-08-11 RX ORDER — ASPIRIN 81 MG/1
81 TABLET, CHEWABLE ORAL DAILY
Status: DISCONTINUED | OUTPATIENT
Start: 2022-08-11 | End: 2022-08-12

## 2022-08-11 RX ORDER — CLOPIDOGREL BISULFATE 75 MG/1
75 TABLET ORAL DAILY
Status: DISCONTINUED | OUTPATIENT
Start: 2022-08-12 | End: 2022-08-12

## 2022-08-11 RX ORDER — TEMAZEPAM 15 MG/1
15 CAPSULE ORAL NIGHTLY PRN
Status: DISCONTINUED | OUTPATIENT
Start: 2022-08-11 | End: 2022-08-12 | Stop reason: HOSPADM

## 2022-08-11 RX ORDER — FAMOTIDINE 20 MG/1
20 TABLET, FILM COATED ORAL NIGHTLY
Status: DISCONTINUED | OUTPATIENT
Start: 2022-08-11 | End: 2022-08-12 | Stop reason: HOSPADM

## 2022-08-11 RX ORDER — DIPHENHYDRAMINE HCL 50 MG
50 CAPSULE ORAL ONCE AS NEEDED
Status: DISCONTINUED | OUTPATIENT
Start: 2022-08-11 | End: 2022-08-12 | Stop reason: HOSPADM

## 2022-08-11 RX ORDER — BISACODYL 10 MG
10 SUPPOSITORY, RECTAL RECTAL DAILY PRN
Status: DISCONTINUED | OUTPATIENT
Start: 2022-08-11 | End: 2022-08-12 | Stop reason: HOSPADM

## 2022-08-11 RX ORDER — ONDANSETRON 2 MG/ML
4 INJECTION INTRAMUSCULAR; INTRAVENOUS EVERY 6 HOURS PRN
Status: DISCONTINUED | OUTPATIENT
Start: 2022-08-11 | End: 2022-08-12 | Stop reason: HOSPADM

## 2022-08-11 RX ORDER — ENOXAPARIN SODIUM 100 MG/ML
40 INJECTION SUBCUTANEOUS DAILY
Status: DISCONTINUED | OUTPATIENT
Start: 2022-08-11 | End: 2022-08-12 | Stop reason: HOSPADM

## 2022-08-11 RX ADMIN — Medication 1 CAPSULE: at 16:59

## 2022-08-11 RX ADMIN — ENOXAPARIN SODIUM 40 MG: 40 INJECTION SUBCUTANEOUS at 16:59

## 2022-08-11 RX ADMIN — ASPIRIN 81 MG CHEWABLE TABLET 81 MG: 81 TABLET CHEWABLE at 12:23

## 2022-08-11 RX ADMIN — FLUTICASONE PROPIONATE 1 SPRAY: 50 SPRAY, METERED NASAL at 21:10

## 2022-08-11 RX ADMIN — FAMOTIDINE 20 MG: 20 TABLET ORAL at 19:48

## 2022-08-11 RX ADMIN — Medication 10 ML: at 19:51

## 2022-08-11 RX ADMIN — CLOPIDOGREL BISULFATE 300 MG: 75 TABLET ORAL at 12:21

## 2022-08-11 RX ADMIN — TEMAZEPAM 15 MG: 15 CAPSULE ORAL at 22:38

## 2022-08-11 RX ADMIN — IOPAMIDOL 115 ML: 755 INJECTION, SOLUTION INTRAVENOUS at 06:51

## 2022-08-11 NOTE — CONSULTS
Stroke Consult Note    Patient Name: Kamille Miles   MRN: [id number]  Age: 87 y.o.  Sex: female  : 1935    Primary Care Physician: Brisa Lord MD  Referring Physician:  Zana Carl MD    TIME STROKE TEAM CALLED: 622 EST     TIME PATIENT SEEN: 630 EST    Handedness: Right  Race:     Chief Complaint/Reason for Consultation: Transient dysarthria    HPI: Kamille Miles is an 87 year old female with known medical history of hypertension and cervicogenic headache who experienced transient dysarthria yesterday evening between 2030 and 2200, she tells me that by the time she went to bed she felt that her speech had returned to normal, this morning she woke up at her neurologic baseline, however she decided to come to the emergency room for evaluation due to concern for TIA or stroke.  She tells me that her  has previously had a stroke and he was concerned about her dysarthria from overnight. /95.    She is met emergently in CT scan where she receives an NIHSS of 0.  Asked patient to ambulate around the CT room where she was able to ambulate without difficulty, no change neurologic exam after ambulation.  EOM intact, PERRL, speech clear and articulate.  Bilateral upper extremity and bilateral lower extremity equal strength.  Patient denies numbness or tingling.  Face symmetric at rest and with movement.     Patient denies use of anticoagulants, she does take ASA 81 mg daily.    She was diagnosed with COVID-19 yesterday, she tells me that she is experiencing mild symptoms of cough, congestion, sore throat.  She denies headache, dizziness, blurry vision, double vision, nausea, vomiting, diarrhea, or burning with urination.  She tells me that she does have urinary frequency but that this is not new for her.    CT head negative abnormality, no hemorrhage  CTA head and neck negative for LVO, aneurysmal dilation, or flow-limiting stenosis  CTP does not demonstrate any perfusion  abnormalities    Last Known Normal Date/Time: At time of exam (0630) EST     Review of Systems   Constitutional: Positive for fatigue.   HENT: Positive for sore throat. Negative for trouble swallowing.    Eyes: Negative for visual disturbance.   Respiratory: Positive for cough. Negative for shortness of breath.    Gastrointestinal: Negative for diarrhea, nausea and vomiting.   Genitourinary: Positive for frequency.   Musculoskeletal: Negative for gait problem.   Neurological: Positive for speech difficulty. Negative for dizziness, tremors, facial asymmetry and headaches.      Past Medical History:   Diagnosis Date   • Headache     cervicogenic -work-up by neurologist in [place], [place]   • Hypertension      No past surgical history on file.  Family History   Problem Relation Age of Onset   • Arrhythmia Mother         Pacemaker placed   • Lymphoma Father    • Breast cancer Neg Hx    • Ovarian cancer Neg Hx      Social History     Socioeconomic History   • Marital status:    Tobacco Use   • Smoking status: Never Smoker   • Smokeless tobacco: Never Used   Substance and Sexual Activity   • Alcohol use: Yes     Alcohol/week: 5.0 standard drinks     Types: 5 Glasses of wine per week     Comment: at dinner   • Drug use: No   • Sexual activity: Yes     Partners: Male     No Known Allergies  Prior to Admission medications    Medication Sig Start Date End Date Taking? Authorizing Provider   aspirin 81 MG tablet Take 1 tablet by mouth Daily. 9/3/14   Jg Worley MD   Calcium 500-100 MG-UNIT chewable tablet Chew 1 tablet Every 4 (Four) Hours As Needed.    ProviderJg MD   cholecalciferol (Vitamin D, Cholecalciferol,) 25 MCG (1000 UT) tablet Take 1 tablet by mouth Daily. 6/16/22   Brisa Lord MD   cyanocobalamin (VITAMIN B-12) 500 MCG tablet Take 1 tablet by mouth Daily. 6/16/22   Brisa Lord MD   famotidine (PEPCID) 20 MG tablet Take 1 tablet by mouth Every Night. 6/16/22   Pop  Brisa CHANG MD   fluticasone (FLONASE) 50 MCG/ACT nasal spray 1 spray into the nostril(s) as directed by provider 2 (Two) Times a Day. 6/16/22   Brisa Lord MD   hydrocortisone 1 % cream Apply  topically to the appropriate area as directed 2 (Two) Times a Day As Needed for Rash (itching). 5/16/19   Brisa Lord MD   Hydrocortisone, Perianal, (ANUSOL-HC) 2.5 % rectal cream Insert  into the rectum 2 (Two) Times a Day As Needed for Hemorrhoids. 2/10/22   Brisa Lord MD   ketoconazole (NIZORAL) 2 % cream Apply 1 application topically to the appropriate area as directed Daily. 7/25/22   Brisa Lord MD   lisinopril (PRINIVIL,ZESTRIL) 5 MG tablet Take 1 tablet by mouth Daily. 6/16/22   Brisa Lord MD   Nirmatrelvir & Ritonavir (PAXLOVID) 20 x 150 MG & 10 x 100MG tablet therapy pack tablet Take 3 tablets by mouth 2 (Two) Times a Day for 5 days. 8/10/22 8/15/22  Brisa Lord MD   Probiotic Product (TRUBIOTICS PO) Take 1 capsule by mouth Daily. Taking QOD    Jg Worley MD   simvastatin (ZOCOR) 10 MG tablet Take 1 tablet by mouth Every Evening. 6/16/22   Brisa Lord MD   zolpidem (AMBIEN) 10 MG tablet Take 1/2 to 1 tablet every evening as needed 8/24/21   Brisa Lord MD         Temp:  [98.6 °F (37 °C)] 98.6 °F (37 °C)  Heart Rate:  [82] 82  Resp:  [16] 16  BP: (167)/(95) 167/95     Neurological Exam  Mental Status  Awake, alert and oriented to person, place and time.Alert. Oriented to person, place, and time. Speech is normal. Language is fluent with no aphasia.    Cranial Nerves  CN II: Visual fields full to confrontation.  CN III, IV, VI: Extraocular movements intact bilaterally. Normal lids and orbits bilaterally. Pupils equal round and reactive to light bilaterally.  CN V: Facial sensation is normal.  CN VII: Full and symmetric facial movement.  CN VIII: Hearing appears intact.  CN IX, X: Palate elevates symmetrically  CN XI: Shoulder shrug strength is  normal.  CN XII: Tongue midline without atrophy or fasciculations.    Motor  Normal muscle bulk throughout. No fasciculations present. Normal muscle tone. Strength is 5/5 throughout all four extremities.    Sensory  Light touch is normal in upper and lower extremities.     Reflexes                                            Right                      Left  Plantar                           Downgoing                Downgoing    Coordination  Right: Finger-to-nose normal. Rapid alternating movement normal. Heel-to-shin normal.Left: Finger-to-nose normal. Rapid alternating movement normal. Heel-to-shin normal.    Gait  Casual gait is normal including stance, stride, and arm swing.      Physical Exam  Constitutional:       General: She is not in acute distress.  HENT:      Head: Normocephalic and atraumatic.      Mouth/Throat:      Mouth: Mucous membranes are moist.      Pharynx: Oropharynx is clear.   Eyes:      General: Lids are normal.      Extraocular Movements: Extraocular movements intact.      Pupils: Pupils are equal, round, and reactive to light.   Cardiovascular:      Rate and Rhythm: Normal rate and regular rhythm.   Pulmonary:      Effort: Pulmonary effort is normal. No respiratory distress.      Comments: Room air  Musculoskeletal:      Right lower leg: No edema.      Left lower leg: No edema.   Skin:     General: Skin is warm and dry.   Neurological:      General: No focal deficit present.      Mental Status: She is alert and oriented to person, place, and time. Mental status is at baseline.      Deep Tendon Reflexes: Strength normal.   Psychiatric:         Speech: Speech normal.         Acute Stroke Data    Thrombolytic Inclusion / Exclusion Criteria    Time: 06:51 EDT  Person Administering Scale: FABIANA Mccord    Inclusion Criteria  [x]   18 years of age or greater   []   Onset of symptoms < 4.5 hours before beginning treatment (stroke onset = time patient was last seen well or without  symptoms).   []   Diagnosis of acute ischemic stroke causing measurable disabling deficit (Complete Hemianopia, Any Aphasia, Visual or Sensory Extinction, Any weakness limiting sustained effort against gravity)   []   Any remaining deficit considered potentially disabling in view of patient and practitioner   Exclusion criteria (Do not proceed with Alteplase if any are checked under exclusion criteria)  []   Onset unknown or GREATER than 4.5 hours   []   ICH on CT/MRI   []   CT demonstrates hypodensity representing acute or subacute infarct   []   Significant head trauma or prior stroke in the previous 3 months   []   Symptoms suggestive of subarachnoid hemorrhage   []   History of un-ruptured intracranial aneurysm GREATER than 10 mm   []   Recent intracranial or intraspinal surgery within the last 3 months   []   Arterial puncture at a non-compressible site in the previous 7 days   []   Active internal bleeding   []   Acute bleeding tendency   []   Platelet count LESS than 100,000 for known hematological diseases such as leukemia, thrombocytopenia or chronic cirrhosis   []   Current use of anticoagulant with INR GREATER than 1.7 or PT GREATER than 15 seconds, aPTT GREATER than 40 seconds   []   Heparin received within 48 hours, resulting in abnormally elevated aPTT GREATER than upper limit of normal   []   Current use of direct thrombin inhibitors or direct factor Xa inhibitors in the past 48 hours   []   Elevated blood pressure refractory to treatment (systolic GREATER than 185 mm/Hg or diastolic  GREATER than 110 mm/Hg   []   Suspected infective endocarditis and aortic arch dissection   []   Current use of therapeutic treatment dose of low-molecular-weight heparin (LMWH) within the previous 24 hours   []   Structural GI malignancy or bleed   Relative exclusion for all patients  [x]   At neurologic baseline, no deficits   []   Pregnancy   []   Seizure at onset with postictal residual neurological impairments   []    Major surgery or previous trauma within past 14 days   []   History of previous spontaneous ICH, intracranial neoplasm, or AV malformation   []   Postpartum (within previous 14 days)   []   Recent GI or urinary tract hemorrhage (within previous 21 days)   []   Recent acute MI (within previous 3 months)   []   History of un-ruptured intracranial aneurysm LESS than 10 mm   []   History of ruptured intracranial aneurysm   []   Blood glucose LESS than 50 mg/dL (2.7 mmol/L)   []   Dural puncture within the last 7 days   []   Known GREATER than 10 cerebral microbleeds   Additional exclusions for patients with symptoms onset between 3 and 4.5 hours.  []   Age > 80.   []   On any anticoagulants regardless of INR  >>> Warfarin (Coumadin), Heparin, Enoxaparin (Lovenox), fondaparinux (Arixtra), bivalirudin (Angiomax), Argatroban, dabigatran (Pradaxa), rivaroxaban (Xarelto), or apixaban (Eliquis)   []   Severe stroke (NIHSS > 25).   []   History of BOTH diabetes and previous ischemic stroke.   []   The risks and benefits have been discussed with the patient or family related to the administration of IV thrombolytic therapy for stroke symptoms.   []   I have discussed and reviewed the patient's case and imaging with the attending prior to IV thrombolytic therapy.   N/A Time IV thrombolytic administered     Patient has returned to neurologic baseline so she is not a candidate for IV TNK.    [facility] Meds:  Scheduled-    Infusions-     PRNs- •  sodium chloride    Functional Status Prior to Current Stroke/[place] Score: 0     (Occasional use of walker for ambulation, but reports that she has not utilized this lately)    NIH Stroke Scale  Time: 06:51 EDT  Person Administering Scale: FABIANA Mccord    Interval: baseline  1a. Level of Consciousness: 0-->Alert, keenly responsive  1b. LOC Questions: 0-->Answers both questions correctly  1c. LOC Commands: 0-->Performs both tasks correctly  2. Best Gaze: 0-->Normal  3. Visual:  0-->No visual loss  4. Facial Palsy: 0-->Normal symmetrical movements  5a. Motor Arm, Left: 0-->No drift, limb holds 90 (or 45) degrees for full 10 secs  5b. Motor Arm, Right: 0-->No drift, limb holds 90 (or 45) degrees for full 10 secs  6a. Motor Leg, Left: 0-->No drift, leg holds 30 degree position for full 5 secs  6b. Motor Leg, Right: 0-->No drift, leg holds 30 degree position for full 5 secs  7. Limb Ataxia: 0-->Absent  8. Sensory: 0-->Normal, no sensory loss  9. Best Language: 0-->No aphasia, normal  10. Dysarthria: 0-->Normal  11. Extinction and Inattention (formerly Neglect): 0-->No abnormality    Total (NIH Stroke Scale): 0  Results Reviewed:  I have personally reviewed current lab, radiology, and data and agree with results.     CT Angiogram Neck    Result Date: 8/11/2022   No significant vascular abnormality. No significant stenosis, aneurysm or dissection.  This report was finalized on 8/11/2022 7:29 AM by Gumaro Ponce MD.      CT Head Without Contrast Stroke Protocol    Result Date: 8/11/2022   1. No acute intracranial abnormality. 2. Mild chronic small vessel ischemic change.  This report was finalized on 8/11/2022 7:17 AM by Gumaro Ponce MD.      CT Angiogram Head w AI Analysis of LVO    Result Date: 8/11/2022   No significant vascular abnormality. No significant stenosis, aneurysm or dissection.  This report was finalized on 8/11/2022 7:29 AM by Gumaro Ponce MD.      CT CEREBRAL PERFUSION WITH & WITHOUT CONTRAST    Result Date: 8/11/2022  No significant perfusion abnormalities in the brain.  This report was finalized on 8/11/2022 7:20 AM by Gumaro Ponce MD.      Glucose 104  Sodium 141  Creatinine 0.80  WBC 5.54  Hemoglobin 13.4  Hematocrit 39.0  Platelets 180    CT head negative abnormality, no hemorrhage  CTA head and neck negative for LVO, aneurysmal dilation, or flow-limiting stenosis  CTP does not demonstrate any perfusion abnormalities    Assessment/Plan:    Kamille Miles is an 87 year  old female with known medical history of hypertension and cervicogenic headache who experienced transient dysarthria yesterday evening between 2030 and 2200, she tells me that by the time she went to bed felt that her speech had returned to normal, this morning she woke up at her neurologic baseline, however she decided to come to the emergency room for evaluation due to concern for TIA.      Patient is not a candidate for IV TNK as she is currently at her neurologic baseline  Patient is not a candidate for emergent endovascular intervention d/t no LVO or flow limiting stenosis visualized on CTA/CTP.     Antiplatelet PTA: ASA 81 mg  Anticoagulant PTA: None        1. Transient dysarthria  1. Symptoms suspicious for TIA, will also check UA to r/o UTI  2. Will initiate TIA/Ischemic Stroke order set without thrombolytic therapy  3. Patient meets high risk TIA criteria per ABCD2 criteria, score 5, will initiate DAPT  4. ASA 81 mg  5. Plavix 300 mg load followed by Plavix 75 mg daily  6. Lipitor 80 mg  7. Fasting lipid panel  8. A1C  9. TTE, no bubble  10. MRI brain without contrast  11. NPO pending bedside dysphagia evaluation, when cleared recommend cardiac diet  12. Activity as tolerated, PT/OT to work with patient  13. UA  2. COVID-19  1. Patient reports that she tested positive for COVID-19 on 8/10/2022  2. Management per hospitalist  3. Hypertension  1. Please allow for permissive HTN, SBP <220  2. Hold home anti-hypertensive medications at this time    Plan of care discussed with ED physician, Dr. Carl, Dr. Sexton, patient's , patient, and bedside nursing staff.    Thank you for allowing us to participate in the care of this patient, stroke neurology will continue to follow.       Sonya Gtaica, APRN  August 11, 2022  06:51 EDT

## 2022-08-11 NOTE — H&P
[facility] Medicine Services  HISTORY AND PHYSICAL    Patient Name: Kamille Miles  : 1935  MRN: [id number]  Primary Care Physician: Brisa Lord MD  Date of admission: 2022      Subjective   Subjective     Chief Complaint:  dysarthria    HPI:  Kamille Miles is a 87 y.o. female with h/o HTN with c/o dysarthria last pm between 2030 and 2200.  Patient states that she does remember anything but states that her  said that she went upstairs to check on her foster cats and fell and had dysarthria but no other weakness, ect.  She reports that she did a home COVID test yesterday that was positive and started taking Paxlovid per her PCP yesterday.  She c/o nasal congestion and sore throat.  Denies any SOA or loss of taste.      Review of Systems   Gen- No fevers, chills  CV- No chest pain, palpitations  Resp- No cough, dyspnea  GI- No N/V/D, abd pain      All other systems reviewed and are negative.     Personal History     Past Medical History:   Diagnosis Date   • Headache     cervicogenic -work-up by neurologist in [place], [place]   • Hypertension              History reviewed. No pertinent surgical history.    Family History:  family history includes Arrhythmia in her mother; Lymphoma in her father. Otherwise pertinent FHx was reviewed and unremarkable.     Social History:  reports that she has never smoked. She has never used smokeless tobacco. She reports current alcohol use of about 5.0 standard drinks of alcohol per week. She reports that she does not use drugs.  Social History     Social History Narrative   • Not on file       Medications:  Available home medication information reviewed.  Medications Prior to Admission   Medication Sig Dispense Refill Last Dose   • aspirin 81 MG tablet Take 1 tablet by mouth Daily.      • Calcium 500-100 MG-UNIT chewable tablet Chew 1 tablet Every 4 (Four) Hours As Needed.      • cholecalciferol (Vitamin D,  Cholecalciferol,) 25 MCG (1000 UT) tablet Take 1 tablet by mouth Daily. 60 tablet 5    • cyanocobalamin (VITAMIN B-12) 500 MCG tablet Take 1 tablet by mouth Daily. 90 tablet 1    • famotidine (PEPCID) 20 MG tablet Take 1 tablet by mouth Every Night. 90 tablet 3    • fluticasone (FLONASE) 50 MCG/ACT nasal spray 1 spray into the nostril(s) as directed by provider 2 (Two) Times a Day. 16 g 5    • hydrocortisone 1 % cream Apply  topically to the appropriate area as directed 2 (Two) Times a Day As Needed for Rash (itching). 1 each 0    • Hydrocortisone, Perianal, (ANUSOL-HC) 2.5 % rectal cream Insert  into the rectum 2 (Two) Times a Day As Needed for Hemorrhoids. 28 g 0    • ketoconazole (NIZORAL) 2 % cream Apply 1 application topically to the appropriate area as directed Daily. 60 g 1    • lisinopril (PRINIVIL,ZESTRIL) 5 MG tablet Take 1 tablet by mouth Daily. 90 tablet 1    • Nirmatrelvir & Ritonavir (PAXLOVID) 20 x 150 MG & 10 x 100MG tablet therapy pack tablet Take 3 tablets by mouth 2 (Two) Times a Day for 5 days. 30 tablet 0    • Probiotic Product (TRUBIOTICS PO) Take 1 capsule by mouth Daily. Taking QOD      • simvastatin (ZOCOR) 10 MG tablet Take 1 tablet by mouth Every Evening. 90 tablet 1    • zolpidem (AMBIEN) 10 MG tablet Take 1/2 to 1 tablet every evening as needed 30 tablet 2        No Known Allergies    Objective   Objective     Vital Signs:   Temp:  [98.6 °F (37 °C)] 98.6 °F (37 °C)  Heart Rate:  [62-82] 63  Resp:  [16] 16  BP: (160-173)/(85-95) 160/86  Total (NIH Stroke Scale): 0    Physical Exam   Constitutional: Awake, alert, sitting up in bed  Eyes: PERRLA, sclerae anicteric, no conjunctival injection  HENT: NCAT, mucous membranes moist  Neck: Supple, no thyromegaly, no lymphadenopathy, trachea midline  Respiratory: Clear to auscultation bilaterally, nonlabored respirations   Cardiovascular: RRR, no murmurs, rubs, or gallops, palpable pedal pulses bilaterally  Gastrointestinal: Positive bowel sounds,  soft, nontender, nondistended  Musculoskeletal: No bilateral ankle edema, no clubbing or cyanosis to extremities  Psychiatric: Appropriate affect, cooperative  Neurologic: Oriented x 3, strength symmetric in all extremities, Cranial Nerves grossly intact to confrontation, speech clear  Skin: No rashes      Result Review:  I have personally reviewed the results from the time of this admission to 8/11/2022 12:20 EDT and agree with these findings:  []  Laboratory list / accordion  []  Microbiology  [x]  Radiology  [x]  EKG/Telemetry   []  Cardiology/Vascular   []  Pathology  []  Old records  []  Other:  Most notable findings include:         LAB RESULTS:      Lab 08/11/22  0741 08/11/22  0658 08/11/22  0637   WBC  --  5.54  --    HEMOGLOBIN  --  13.4  --    HEMOGLOBIN, POC  --   --  12.9   HEMATOCRIT  --  39.0  --    HEMATOCRIT POC  --   --  38   PLATELETS  --  180  --    NEUTROS ABS  --  2.40  --    IMMATURE GRANS (ABS)  --  0.01  --    LYMPHS ABS  --  2.20  --    MONOS ABS  --  0.90  --    EOS ABS  --  0.01  --    MCV  --  91.8  --    APTT 30.3  --   --          Lab 08/11/22  0637   CREATININE 0.80   EGFR 72.3         Lab 08/11/22  0741   ALT (SGPT) 11   AST (SGOT) 20         Lab 08/11/22  0741   TROPONIN T <0.010                 UA    Urinalysis 9/13/21 6/8/22 6/8/22 8/11/22     0914 0914    Squamous Epithelial Cells, UA   0-2    Specific Gravity, UA  <=1.005  1.046 (A)   Ketones, UA Negative Negative  Negative   Blood, UA  Negative  Negative   Leukocytes, UA Negative Negative  Negative   Nitrite, UA  Negative  Negative   RBC, UA   0-2    WBC, UA   0-2    Bacteria, UA   None Seen    (A) Abnormal value              Microbiology Results (last 10 days)     ** No results found for the last 240 hours. **          CT Angiogram Neck    Result Date: 8/11/2022  Examination: CT ANGIOGRAM HEAD W AI ANALYSIS OF LVO-, CT ANGIOGRAM NECK-  Date of Exam: 8/11/2022 6:49 AM  Indication: Dysarthria.  Comparison: None available.   Technique: Axial volumetric contrast-enhanced CT angiographic images of the head and neck were acquired utilizing 115 mL Isovue-370  IV contrast. 3-D reconstructions were created for interpretation. Automated exposure control and iterative reconstruction methods were used. AI analysis of LVO was utilized for the CTA Head imaging portion of the study.   Findings: Aortic arch: The aortic arch is unremarkable.  There is conventional 3 vessel arch anatomy.  The right brachiocephalic and visualized bilateral subclavian arteries are within normal limits.  Right carotid: The right CCA arises as expected from the brachiocephalic trunk.  The CCA follows a normal course and appears normal caliber. There is mild calcified plaque at the carotid bifurcation. The external carotid artery and distal branches appear within normal limits.  The cervical internal carotid artery follows a normal course and appears normal caliber throughout the neck and into the head.  The intracranial ICA segments appear within normal limits.  The ophthalmic artery origin is normal.  The A1 and M1 segments appear within normal limits.  The visualized distal MARIANN and MCA branches appear patent.  There is  a patent  anterior communicating artery. There is a small patent posterior communicating artery.  Left carotid: The left CCA arises as expected from the aortic arch.  The CCA follows a normal course and appears normal caliber.  There is mild calcified plaque at the carotid bifurcation without stenosis. The external carotid artery and distal branches appear within normal limits.  The cervical internal carotid artery follows a normal course and appears normal caliber throughout the neck and into the head.  The intracranial ICA segments appear within normal limits.  The ophthalmic artery origin is normal.  The A1 and M1 segments appear within normal limits.  The visualized distal MARIANN and MCA branches appear patent. There is a large patent posterior  communicating artery.  Posterior circulation: Vertebral arteries arise as expected from ipsilateral subclavian arteries.  The vertebral arteries are codominant.  The vertebral arteries follow a normal course and appear normal caliber throughout the neck and into the head.  The V4 segments are patent. Visualized posterior inferior cerebellar arteries are within normal limits.  The basilar artery is normal caliber.  Superior cerebellar arteries are patent.  Bilateral P1 segments and posterior cerebral arteries appear within normal limits.  Nonvascular findings: Intracranial structures appear unremarkable. There is thinning of the orbital lenses bilaterally. Paranasal sinuses and mastoid air cells appear well aerated.  Superficial soft tissues and underlying musculature appear within normal limits.  There is dense scarring and parenchymal calcification and pleural thickening in the lung apices. There is mild right upper lobe bronchiectasis. The thyroid and salivary glands appear unremarkable.  The suprahyoid and infrahyoid spaces of the neck appear unremarkable.  There is no evidence of cervical lymphadenopathy or a neck mass.  There are no acute osseous abnormalities or destructive bone lesions.      Impression:  No significant vascular abnormality. No significant stenosis, aneurysm or dissection.  This report was finalized on 8/11/2022 7:29 AM by Gumaro Ponce MD.      XR Chest 1 View    Result Date: 8/11/2022  Examination: XR CHEST 1 VW-  Date of Exam: 8/11/2022 7:28 AM  Indication: Acute Stroke Protocol (onset < 12 hrs).  Comparison: 6/1/2021.  Technique: Single radiographic view of the chest was obtained.  Findings: Lungs remain hyperexpanded. There is no pneumothorax, pleural effusion or focal airspace consolidation. Cardiomediastinal silhouette is unremarkable. Pulmonary vasculature appears within normal limits. Regional bones appear grossly intact.      Impression: No acute cardiopulmonary abnormality.  This  report was finalized on 8/11/2022 7:44 AM by Gumaro Ponce MD.      CT Head Without Contrast Stroke Protocol    Result Date: 8/11/2022  Examination: CT HEAD WO CONTRAST STROKE PROTOCOL-  Date of Exam: 8/11/2022 6:32 AM  Indication: Neuro deficit, acute, stroke suspected.  Comparison: None available.  Technique: Axial noncontrast CT imaging of the head was performed. Automated exposure control and iterative reconstruction methods were used.  Findings: There are prominent perivascular spaces in the inferior basal ganglia. There is mild patchy periventricular white matter hypoattenuation. Superficial soft tissues appear within normal limits. The calvarium is intact.  Paranasal sinuses and mastoid air cells appear well aerated. Orbits are unremarkable.  There is no acute intracranial hemorrhage.  No mass effect or midline shift.  No abnormal extra-axial collections. Gray-white differentiation is within normal limits.  There are no focal hypoattenuating lesions.  Ventricular size and configuration is normal for age.      Impression:  1. No acute intracranial abnormality. 2. Mild chronic small vessel ischemic change.  This report was finalized on 8/11/2022 7:17 AM by Gumaro Ponce MD.      CT Angiogram Head w AI Analysis of LVO    Result Date: 8/11/2022  Examination: CT ANGIOGRAM HEAD W AI ANALYSIS OF LVO-, CT ANGIOGRAM NECK-  Date of Exam: 8/11/2022 6:49 AM  Indication: Dysarthria.  Comparison: None available.  Technique: Axial volumetric contrast-enhanced CT angiographic images of the head and neck were acquired utilizing 115 mL Isovue-370  IV contrast. 3-D reconstructions were created for interpretation. Automated exposure control and iterative reconstruction methods were used. AI analysis of LVO was utilized for the CTA Head imaging portion of the study.   Findings: Aortic arch: The aortic arch is unremarkable.  There is conventional 3 vessel arch anatomy.  The right brachiocephalic and visualized bilateral  subclavian arteries are within normal limits.  Right carotid: The right CCA arises as expected from the brachiocephalic trunk.  The CCA follows a normal course and appears normal caliber. There is mild calcified plaque at the carotid bifurcation. The external carotid artery and distal branches appear within normal limits.  The cervical internal carotid artery follows a normal course and appears normal caliber throughout the neck and into the head.  The intracranial ICA segments appear within normal limits.  The ophthalmic artery origin is normal.  The A1 and M1 segments appear within normal limits.  The visualized distal MARIANN and MCA branches appear patent.  There is  a patent  anterior communicating artery. There is a small patent posterior communicating artery.  Left carotid: The left CCA arises as expected from the aortic arch.  The CCA follows a normal course and appears normal caliber.  There is mild calcified plaque at the carotid bifurcation without stenosis. The external carotid artery and distal branches appear within normal limits.  The cervical internal carotid artery follows a normal course and appears normal caliber throughout the neck and into the head.  The intracranial ICA segments appear within normal limits.  The ophthalmic artery origin is normal.  The A1 and M1 segments appear within normal limits.  The visualized distal MARIANN and MCA branches appear patent. There is a large patent posterior communicating artery.  Posterior circulation: Vertebral arteries arise as expected from ipsilateral subclavian arteries.  The vertebral arteries are codominant.  The vertebral arteries follow a normal course and appear normal caliber throughout the neck and into the head.  The V4 segments are patent. Visualized posterior inferior cerebellar arteries are within normal limits.  The basilar artery is normal caliber.  Superior cerebellar arteries are patent.  Bilateral P1 segments and posterior cerebral arteries  appear within normal limits.  Nonvascular findings: Intracranial structures appear unremarkable. There is thinning of the orbital lenses bilaterally. Paranasal sinuses and mastoid air cells appear well aerated.  Superficial soft tissues and underlying musculature appear within normal limits.  There is dense scarring and parenchymal calcification and pleural thickening in the lung apices. There is mild right upper lobe bronchiectasis. The thyroid and salivary glands appear unremarkable.  The suprahyoid and infrahyoid spaces of the neck appear unremarkable.  There is no evidence of cervical lymphadenopathy or a neck mass.  There are no acute osseous abnormalities or destructive bone lesions.      Impression:  No significant vascular abnormality. No significant stenosis, aneurysm or dissection.  This report was finalized on 8/11/2022 7:29 AM by Gumaro Ponce MD.      CT CEREBRAL PERFUSION WITH & WITHOUT CONTRAST    Result Date: 8/11/2022  DATE OF EXAM: 8/11/2022 6:49 AM  PROCEDURE: CT CEREBRAL PERFUSION W WO CONTRAST-  INDICATIONS: Neuro deficit, acute, stroke suspected  COMPARISON: No comparisons available.  TECHNIQUE: Routine transaxial cuts were obtained through the head without administration of contrast. Routine transaxial cuts were then obtained through the head following the intravenous administration of 115 mL of Isovue 370. Core blood volume, core blood flow, mean transit time, and Tmax images were obtained utilizing the Rapid software protocol. A limited CT angiogram of the head was also performed to measure the blood vessel density.  The radiation dose reduction device was turned on for each scan per the ALARA (As Low as Reasonably Achievable) protocol.  FINDINGS: There are no perfusion abnormalities in the brain. No evidence of core infarct or brain embolus.      Impression: No significant perfusion abnormalities in the brain.  This report was finalized on 8/11/2022 7:20 AM by Gumaro Ponce MD.             Assessment & Plan   Assessment & Plan     Active Hospital Problems    Diagnosis  POA   • TIA (transient ischemic attack) [G45.9]  Yes       86yo with h/o HTN and tested positive on 8/10 for COVID on home test with dysarthria for about 1 1/2 hours last pm after taking paxlovid    Dysarthria  R/o TIA  --symptoms resolved  --no acute on CTA head/neck/CT perfusion  --neuro following.  Recs asa, plavix, high dose lipitor  --MRI head pending  --TTE  Pending  --passed dysphagia screen so diet ordered    COVID+  --mild symptoms.  Will repeat test here per patient request since was home test  --continue paxlovid day 2  --chloraseptic spray for sore throat    HTN  --hold home lisinopril for now to allow permissive HTN    DVT prophylaxis:  lovenox      CODE STATUS:  FULL CODE  Code Status and Medical Interventions:   Ordered at: 08/11/22 1220     Level Of Support Discussed With:    Patient     Code Status (Patient has no pulse and is not breathing):    CPR (Attempt to Resuscitate)     Medical Interventions (Patient has pulse or is breathing):    Full Support         Micheal Montoya MD  08/11/22

## 2022-08-11 NOTE — ED PROVIDER NOTES
"Subjective   This patient is a very nice and pleasant 87-year-old  female who appears much younger than her stated age.  She comes in today stating that she had a speech issue yesterday.  She has no speech issues currently.  She tells me that her  has a history of TIA and therefore she felt fairly comfortable that he was able to identify symptoms and others that were similar.  More specifically, the patient indicates that last night at approximately 8 PM Eastern time, her  stated to her that she was having some difficulty with speech and that she was slurring her words slightly.  She tells me she did not think much about it, but looking back, does believe he was right.  She believes the symptoms lasted from roughly 8 PM to 10 PM Eastern last night.  She tells me she ultimately went to sleep at 10 PM and when she woke up was completely back to normal.  She decided to present to the emergency department today, because, upon awakening, she thought that perhaps she should get this worked up and that it could have been a \"mini stroke.\"  She currently denies any headache or vision changes.  Denies any difficulty with cognition.  Denies any weakness last night or today.  She has never had symptoms like this before.  She tells me her only medications include antihypertensive medication in the form of lisinopril.  Past medical documentation also indicates that she takes Zocor.  She does not take anticoagulation.  In summary, we have an 87-year-old female who had what sounds like dysarthria last night with symptoms resolving upon awakening this morning at 530 or so.  Patient is here unaccompanied.  Of interesting note, she was recently diagnosed with COVID yesterday and was started on Paxlovid.    Past medical history  Hypertension, dyslipidemia.  Negative for CAD, CVA, or diabetes.    Family history  Positive for stroke          Review of Systems   Constitutional: Negative.  Negative for chills, " fatigue, fever and unexpected weight change.   HENT: Negative for dental problem, ear pain, hearing loss and sinus pressure.    Eyes: Negative for pain and visual disturbance.   Respiratory: Negative for chest tightness and shortness of breath.    Cardiovascular: Negative for chest pain, palpitations and leg swelling.   Gastrointestinal: Negative for blood in stool, diarrhea, nausea and vomiting.   Genitourinary: Negative for difficulty urinating, dysuria, frequency, hematuria and urgency.   Musculoskeletal: Negative for myalgias, neck pain and neck stiffness.   Neurological: Positive for speech difficulty. Negative for seizures, syncope, light-headedness and headaches.   Psychiatric/Behavioral: Negative for confusion.   All other systems reviewed and are negative.      Past Medical History:   Diagnosis Date   • Headache     cervicogenic -work-up by neurologist in [place], [place]   • Hypertension        No Known Allergies    History reviewed. No pertinent surgical history.    Family History   Problem Relation Age of Onset   • Arrhythmia Mother         Pacemaker placed   • Lymphoma Father    • Breast cancer Neg Hx    • Ovarian cancer Neg Hx        Social History     Socioeconomic History   • Marital status:    Tobacco Use   • Smoking status: Never Smoker   • Smokeless tobacco: Never Used   Substance and Sexual Activity   • Alcohol use: Yes     Alcohol/week: 5.0 standard drinks     Types: 5 Glasses of wine per week     Comment: at dinner   • Drug use: No   • Sexual activity: Yes     Partners: Male           Objective   Physical Exam  Vitals and nursing note reviewed.   Constitutional:       General: She is not in acute distress.     Appearance: She is well-developed. She is not toxic-appearing.   HENT:      Head: Normocephalic and atraumatic.      Jaw: No trismus.      Right Ear: External ear normal.      Left Ear: Ear canal and external ear normal.      Nose: Nose normal.      Mouth/Throat:      Mouth:  Mucous membranes are moist. Mucous membranes are not dry. No oral lesions.      Dentition: No dental abscesses.      Pharynx: Oropharynx is clear. No posterior oropharyngeal erythema or uvula swelling.      Tonsils: No tonsillar exudate or tonsillar abscesses.   Eyes:      Extraocular Movements: Extraocular movements intact.      Conjunctiva/sclera: Conjunctivae normal.      Right eye: Right conjunctiva is not injected.      Left eye: Left conjunctiva is not injected.      Pupils: Pupils are equal, round, and reactive to light.   Neck:      Vascular: No JVD.      Trachea: No tracheal tenderness.   Cardiovascular:      Rate and Rhythm: Normal rate and regular rhythm.      Heart sounds: Normal heart sounds.     No friction rub. No gallop.   Pulmonary:      Effort: Pulmonary effort is normal.      Breath sounds: Normal breath sounds. No wheezing or rales.   Chest:      Chest wall: No tenderness.   Abdominal:      General: Bowel sounds are normal. There is no distension.      Palpations: Abdomen is soft. Abdomen is not rigid. There is no mass or pulsatile mass.      Tenderness: There is no abdominal tenderness. There is no guarding or rebound. Negative signs include McBurney's sign.      Comments: No signs of acute abdomen.  No pain at McBurney's point.  No pulsatile abdominal mass.   Musculoskeletal:         General: No tenderness or deformity. Normal range of motion.      Cervical back: Normal range of motion and neck supple. No rigidity. Normal range of motion.   Lymphadenopathy:      Cervical: No cervical adenopathy.   Skin:     General: Skin is warm and dry.      Capillary Refill: Capillary refill takes less than 2 seconds.      Findings: No erythema or rash.      Comments: No diaphoresis, lesions, nevi, petechia, purpura   Neurological:      Mental Status: She is alert and oriented to person, place, and time.      Cranial Nerves: No cranial nerve deficit.      Sensory: No sensory deficit.      Motor: No tremor or  abnormal muscle tone.      Comments: 5/5 strength bilaterally with flexion and extension of fingers, wrist, elbows, knees and hips as well as plantar and dorsiflexion of the foot.   Psychiatric:         Attention and Perception: She is attentive.         Speech: Speech normal.         Behavior: Behavior normal.         Thought Content: Thought content normal.         Judgment: Judgment normal.         Critical Care  Performed by: Zana Carl MD  Authorized by: Zana Carl MD     Critical care provider statement:     Critical care time (minutes):  75    Critical care start time:  8/11/2022 6:42 AM    Critical care end time:  8/11/2022 7:57 AM    Critical care was necessary to treat or prevent imminent or life-threatening deterioration of the following conditions:  CNS failure or compromise    Critical care was time spent personally by me on the following activities:  Development of treatment plan with patient or surrogate, discussions with consultants, evaluation of patient's response to treatment, examination of patient, ordering and performing treatments and interventions, ordering and review of laboratory studies, ordering and review of radiographic studies, pulse oximetry, re-evaluation of patient's condition and review of old charts    I assumed direction of critical care for this patient from another provider in my specialty: no      Care discussed with: admitting provider                 ED Course  ED Course as of 08/11/22 1602   Thu Aug 11, 2022   0645 I had a great conversation with the patient at the bedside.  She is at baseline here and has no evidence of dysarthria, facial droop or neurologic deficit.  I reviewed CT of the head without contrast personally in the CT scanner.  No radiologist available at this time.  Stroke coordinator at the bedside.  NIH stroke scale 0 at this time.  Stroke team will be collaborating on the patient.  We will come up with ultimate disposition and plan  collaboratively.  Patient has no questions or complaints.  She has not a tPA candidate as she has no symptoms now and the symptoms that did occur, occurred roughly 10 to 12 hours ago.  Final impression and plan pending.  Critical care time will be documented downstream. [PADMINI]   9188 Patient's labs and diagnostic studies have started to result.  CT head and CT perfusion studies have been reviewed independently and also starting to be resulted from a radiologic standpoint.  CBC is unremarkable.  Point-of-care Chem-8 also unremarkable.  Chest x-ray unremarkable.  I have reevaluated the patient twice for a total of 3 evaluations.  I have stayed in close contact with the stroke coordinator/neurology team.  Attending neurologist would like to bring the patient into the hospitalist for further care and work-up and I have let the patient know about this.  Although she is little disappointed she certainly understands and was very verbally appreciative for the time spent the bedside in the cautious approach we were taking here.  I will contact the hospitalist, , for admission.  Impression will include TIA, dysarthria, resolved.  Plan will include admission to the hospitalist with further care by neurology.  Critical care time on this patient 75 minutes. [PADMINI]   0801 I discussed the case with the hospitalist at approximately 8 AM Eastern time.  We discussed the patient's recent COVID-19 diagnosis, symptoms last night, and work-up here.  Patient will be admitted accordingly.  I made sure the patient was comfortable with this.  We have also confirmed the plan for admission with the patient's  as well and patient will be admitted accordingly. [PADMINI]      ED Course User Index  [PADMINI] Zaan Carl MD      Recent Results (from the past 24 hour(s))   POC CHEM 8    Collection Time: 08/11/22  6:37 AM    Specimen: Blood   Result Value Ref Range    Glucose 104 70 - 130 mg/dL    BUN 10 8 - 26 mg/dL    Creatinine 0.80 0.60 -  1.30 mg/dL    Sodium 141 138 - 146 mmol/L    POC Potassium 3.4 (L) 3.5 - 4.9 mmol/L    Chloride 105 98 - 109 mmol/L    Total CO2 25 24 - 29 mmol/L    Hemoglobin 12.9 12.0 - 17.0 g/dL    Hematocrit 38 38 - 51 %    Ionized Calcium 1.20 1.20 - 1.32 mmol/L    eGFR 72.3 >60.0 mL/min/1.73   Lavender Top    Collection Time: 08/11/22  6:58 AM   Result Value Ref Range    Extra Tube hold for add-on    Gold Top - SST    Collection Time: 08/11/22  6:58 AM   Result Value Ref Range    Extra Tube Hold for add-ons.    Gray Top    Collection Time: 08/11/22  6:58 AM   Result Value Ref Range    Extra Tube Hold for add-ons.    CBC Auto Differential    Collection Time: 08/11/22  6:58 AM    Specimen: Blood   Result Value Ref Range    WBC 5.54 3.40 - 10.80 10*3/mm3    RBC 4.25 3.77 - 5.28 10*6/mm3    Hemoglobin 13.4 12.0 - 15.9 g/dL    Hematocrit 39.0 34.0 - 46.6 %    MCV 91.8 79.0 - 97.0 fL    MCH 31.5 26.6 - 33.0 pg    MCHC 34.4 31.5 - 35.7 g/dL    RDW 13.2 12.3 - 15.4 %    RDW-SD 44.3 37.0 - 54.0 fl    MPV 11.0 6.0 - 12.0 fL    Platelets 180 140 - 450 10*3/mm3    Neutrophil % 43.3 42.7 - 76.0 %    Lymphocyte % 39.7 19.6 - 45.3 %    Monocyte % 16.2 (H) 5.0 - 12.0 %    Eosinophil % 0.2 (L) 0.3 - 6.2 %    Basophil % 0.4 0.0 - 1.5 %    Immature Grans % 0.2 0.0 - 0.5 %    Neutrophils, Absolute 2.40 1.70 - 7.00 10*3/mm3    Lymphocytes, Absolute 2.20 0.70 - 3.10 10*3/mm3    Monocytes, Absolute 0.90 0.10 - 0.90 10*3/mm3    Eosinophils, Absolute 0.01 0.00 - 0.40 10*3/mm3    Basophils, Absolute 0.02 0.00 - 0.20 10*3/mm3    Immature Grans, Absolute 0.01 0.00 - 0.05 10*3/mm3    nRBC 0.0 0.0 - 0.2 /100 WBC   ECG 12 Lead    Collection Time: 08/11/22  7:14 AM   Result Value Ref Range    QT Interval 394 ms    QTC Interval 439 ms   Troponin    Collection Time: 08/11/22  7:41 AM    Specimen: Blood   Result Value Ref Range    Troponin T <0.010 0.000 - 0.030 ng/mL   aPTT    Collection Time: 08/11/22  7:41 AM    Specimen: Blood   Result Value Ref Range     PTT 30.3 22.0 - 39.0 seconds   AST    Collection Time: 08/11/22  7:41 AM    Specimen: Blood   Result Value Ref Range    AST (SGOT) 20 1 - 32 U/L   ALT    Collection Time: 08/11/22  7:41 AM    Specimen: Blood   Result Value Ref Range    ALT (SGPT) 11 1 - 33 U/L   Green Top (Gel)    Collection Time: 08/11/22  7:41 AM   Result Value Ref Range    Extra Tube Hold for add-ons.    Light Blue Top    Collection Time: 08/11/22  7:41 AM   Result Value Ref Range    Extra Tube Hold for add-ons.    Urinalysis With Culture If Indicated - Urine, Clean Catch    Collection Time: 08/11/22  7:43 AM    Specimen: Urine, Clean Catch   Result Value Ref Range    Color, UA Yellow Yellow, Straw    Appearance, UA Clear Clear    pH, UA 7.0 5.0 - 8.0    Specific Gravity, UA 1.046 (H) 1.001 - 1.030    Glucose, UA Negative Negative    Ketones, UA Negative Negative    Bilirubin, UA Negative Negative    Blood, UA Negative Negative    Protein, UA Negative Negative    Leuk Esterase, UA Negative Negative    Nitrite, UA Negative Negative    Urobilinogen, UA 1.0 E.U./dL 0.2 - 1.0 E.U./dL   Adult Transthoracic Echo Complete W/ Cont if Necessary Per Protocol (With Agitated Saline)    Collection Time: 08/11/22  3:07 PM   Result Value Ref Range    Target HR (85%) 115 bpm    Max. Pred. HR (100%) 135 bpm    BH CV VAS BP LEFT /86 mmHg    RV S' 18.2 cm/sec    RV Base 4.1 cm    RV Length 6.3 cm    RV Mid 2.6 cm    Avg E/e' ratio 7.80     Ao root diam 3.1 cm    Ao pk minor 152.0 cm/sec    Ao V2 VTI 27.6 cm    MARLEE(I,D) 2.06 cm2    EDV(cubed) 83.5 ml    EDV(MOD-sp2) 96.4 ml    EDV(MOD-sp4) 91.1 ml    EF(MOD-bp) 58.1 %    EF(MOD-sp2) 56.5 %    EF(MOD-sp4) 62.5 %    ESV(cubed) 44.3 ml    ESV(MOD-sp2) 41.9 ml    ESV(MOD-sp4) 34.2 ml    IVS/LVPW 1.20 cm    Lat Peak E' Minor 10.6 cm/sec    LV mass(C)d 124.5 grams    LV V1 max PG 4.1 mmHg    LV V1 mean PG 1.89 mmHg    LV V1 max 101.5 cm/sec    LVPWd 0.81 cm    Med Peak E' Minor 6.1 cm/sec    MV dec slope 295.7 cm/sec2     MV dec time 0.19 msec    MV P1/2t 83.3 msec    MV V2 VTI 26.9 cm    MVA(VTI) 2.11 cm2    PA acc time 0.13 sec    PA pr(Accel) 20.4 mmHg    SV(LVOT) 56.7 ml    SV(MOD-sp2) 54.5 ml    SV(MOD-sp4) 56.9 ml    TR max PG 32.8 mmHg    Ao max PG 9.2 mmHg    Ao mean PG 4.1 mmHg    FS 19.0 %    IVSd 0.97 cm    LA dimension (2D)  2.9 cm    LV V1 VTI 23.0 cm    LVIDd 4.4 cm    LVIDs 3.5 cm    LVOT area 2.47 cm2    LVOT diam 1.77 cm    MV E/A 0.86     MV max PG 3.1 mmHg    MV mean PG 1.57 mmHg    MVA(P1/2t) 2.6 cm2    MV A max rosmery 75.8 cm/sec    MV E max rosmery 65.1 cm/sec    TR max rosmery 286.0 cm/sec    TAPSE (>1.6) 1.10 cm     Note: In addition to lab results from this visit, the labs listed above may include labs taken at another facility or during a different encounter within the last 24 hours. Please correlate lab times with ED admission and discharge times for further clarification of the services performed during this visit.    XR Chest 1 View   Final Result   No acute cardiopulmonary abnormality.       This report was finalized on 8/11/2022 7:44 AM by Gumaro Ponce MD.          CT Angiogram Head w AI Analysis of LVO   Final Result       No significant vascular abnormality. No significant stenosis, aneurysm   or dissection.       This report was finalized on 8/11/2022 7:29 AM by Gumaro Ponce MD.          CT Angiogram Neck   Final Result       No significant vascular abnormality. No significant stenosis, aneurysm   or dissection.       This report was finalized on 8/11/2022 7:29 AM by Gumaro Ponce MD.          CT CEREBRAL PERFUSION WITH & WITHOUT CONTRAST   Final Result   No significant perfusion abnormalities in the brain.       This report was finalized on 8/11/2022 7:20 AM by Gumaro Ponce MD.          CT Head Without Contrast Stroke Protocol   Final Result       1. No acute intracranial abnormality.   2. Mild chronic small vessel ischemic change.       This report was finalized on 8/11/2022 7:17 AM by Gumaro Ponce  "MD.          MRI Brain Without Contrast    (Results Pending)     Vitals:    08/11/22 1000 08/11/22 1200 08/11/22 1400 08/11/22 1506   BP:       Pulse: 76 67 72    Resp:       Temp:       SpO2: 99%      Weight:    54.4 kg (120 lb)   Height:    167.6 cm (66\")     Medications   sodium chloride 0.9 % flush 10 mL (has no administration in time range)   sodium chloride 0.9 % flush 10 mL ( Intravenous Canceled Entry 8/11/22 1258)   sodium chloride 0.9 % flush 10 mL (has no administration in time range)   atorvastatin (LIPITOR) tablet 80 mg (has no administration in time range)   aspirin chewable tablet 81 mg (81 mg Oral Given 8/11/22 1223)     Or   aspirin suppository 300 mg ( Rectal Not Given:  See Alt 8/11/22 1223)   clopidogrel (PLAVIX) tablet 300 mg (300 mg Oral Given 8/11/22 1221)     And   clopidogrel (PLAVIX) tablet 75 mg (has no administration in time range)   famotidine (PEPCID) tablet 20 mg (has no administration in time range)   fluticasone (FLONASE) 50 MCG/ACT nasal spray 1 spray (has no administration in time range)   lactobacillus acidophilus (RISAQUAD) capsule 1 capsule (has no administration in time range)   temazepam (RESTORIL) capsule 15 mg (has no administration in time range)   sodium chloride 0.9 % flush 10 mL (has no administration in time range)   sodium chloride 0.9 % flush 10 mL (has no administration in time range)   acetaminophen (TYLENOL) tablet 650 mg (has no administration in time range)     Or   acetaminophen (TYLENOL) 160 MG/5ML solution 650 mg (has no administration in time range)     Or   acetaminophen (TYLENOL) suppository 650 mg (has no administration in time range)   sennosides-docusate (PERICOLACE) 8.6-50 MG per tablet 2 tablet (2 tablets Oral Not Given 8/11/22 1434)     And   polyethylene glycol (MIRALAX) packet 17 g (has no administration in time range)     And   bisacodyl (DULCOLAX) EC tablet 5 mg (has no administration in time range)     And   bisacodyl (DULCOLAX) suppository 10 mg " (has no administration in time range)   ondansetron (ZOFRAN) tablet 4 mg (has no administration in time range)     Or   ondansetron (ZOFRAN) injection 4 mg (has no administration in time range)   Enoxaparin Sodium (LOVENOX) syringe 40 mg (has no administration in time range)   phenol (CHLORASEPTIC) 1.4 % liquid 1 spray (has no administration in time range)   EPINEPHrine (Anaphylaxis) (ADRENALIN) injection 0.3 mg (has no administration in time range)   diphenhydrAMINE (BENADRYL) capsule 50 mg (has no administration in time range)     Or   diphenhydrAMINE (BENADRYL) injection 50 mg (has no administration in time range)   methylPREDNISolone sodium succinate (SOLU-Medrol) injection 125 mg (has no administration in time range)   sodium chloride 0.9 % infusion 30 mL (has no administration in time range)   bebtelovimab injection 175 mg (has no administration in time range)   iopamidol (ISOVUE-370) 76 % injection 150 mL (115 mL Intravenous Given 8/11/22 0651)     ECG/EMG Results (last 24 hours)     Procedure Component Value Units Date/Time    ECG 12 Lead [[id number]] Collected: 08/11/22 0714     Updated: 08/11/22 0728     QT Interval 394 ms      QTC Interval 439 ms     Narrative:      Test Reason : Acute Stroke Protocol (onset < 12 hrs)  Blood Pressure :   */*   mmHG  Vent. Rate :  75 BPM     Atrial Rate :  75 BPM     P-R Int : 170 ms          QRS Dur :  76 ms      QT Int : 394 ms       P-R-T Axes :  91  42 -58 degrees     QTc Int : 439 ms    Undetermined rhythm  Septal infarct , age undetermined  ST & T wave abnormality, consider inferior ischemia  Abnormal ECG  No previous ECGs available    Referred By: EDMD           Confirmed By:     Adult Transthoracic Echo Complete W/ Cont if Necessary Per Protocol (With Agitated Saline) [[id number]] Resulted: 08/11/22 1527     Updated: 08/11/22 1533     Target HR (85%) 115 bpm      Max. Pred. HR (100%) 135 bpm      BH CV VAS BP LEFT /86 mmHg      RV S' 18.2 cm/sec      RV Base  4.1 cm      RV Length 6.3 cm      RV Mid 2.6 cm      Avg E/e' ratio 7.80     Ao root diam 3.1 cm      Ao pk minor 152.0 cm/sec      Ao V2 VTI 27.6 cm      MARLEE(I,D) 2.06 cm2      EDV(cubed) 83.5 ml      EDV(MOD-sp2) 96.4 ml      EDV(MOD-sp4) 91.1 ml      EF(MOD-bp) 58.1 %      EF(MOD-sp2) 56.5 %      EF(MOD-sp4) 62.5 %      ESV(cubed) 44.3 ml      ESV(MOD-sp2) 41.9 ml      ESV(MOD-sp4) 34.2 ml      IVS/LVPW 1.20 cm      Lat Peak E' Minor 10.6 cm/sec      LV mass(C)d 124.5 grams      LV V1 max PG 4.1 mmHg      LV V1 mean PG 1.89 mmHg      LV V1 max 101.5 cm/sec      LVPWd 0.81 cm      Med Peak E' Minor 6.1 cm/sec      MV dec slope 295.7 cm/sec2      MV dec time 0.19 msec      MV P1/2t 83.3 msec      MV V2 VTI 26.9 cm      MVA(VTI) 2.11 cm2      PA acc time 0.13 sec      PA pr(Accel) 20.4 mmHg      SV(LVOT) 56.7 ml      SV(MOD-sp2) 54.5 ml      SV(MOD-sp4) 56.9 ml      TR max PG 32.8 mmHg      Ao max PG 9.2 mmHg      Ao mean PG 4.1 mmHg      FS 19.0 %      IVSd 0.97 cm      LA dimension (2D)  2.9 cm      LV V1 VTI 23.0 cm      LVIDd 4.4 cm      LVIDs 3.5 cm      LVOT area 2.47 cm2      LVOT diam 1.77 cm      MV E/A 0.86     MV max PG 3.1 mmHg      MV mean PG 1.57 mmHg      MVA(P1/2t) 2.6 cm2      MV A max minor 75.8 cm/sec      MV E max minor 65.1 cm/sec      TR max minor 286.0 cm/sec      TAPSE (>1.6) 1.10 cm     Narrative:      · Left ventricular ejection fraction appears to be 56 - 60%.  · Left ventricular diastolic function is consistent with (grade I)   impaired relaxation.  · No significant valvular disease           ECG 12 Lead   Preliminary Result   Test Reason : Acute Stroke Protocol (onset < 12 hrs)   Blood Pressure :   */*   mmHG   Vent. Rate :  75 BPM     Atrial Rate :  75 BPM      P-R Int : 170 ms          QRS Dur :  76 ms       QT Int : 394 ms       P-R-T Axes :  91  42 -58 degrees      QTc Int : 439 ms      Undetermined rhythm   Septal infarct , age undetermined   ST & T wave abnormality, consider inferior ischemia    Abnormal ECG   No previous ECGs available      Referred By: EDMD           Confirmed By:                                                  MDM    Final diagnoses:   TIA (transient ischemic attack)   Dysarthria       ED Disposition  ED Disposition     ED Disposition   Decision to Admit    Condition   --    Comment   Level of Care: Telemetry [5]   Diagnosis: TIA (transient ischemic attack) [684094]   Bed Request Comments: COVID positive               No follow-up provider specified.       Medication List      No changes were made to your prescriptions during this visit.          Zana Carl MD  08/11/22 3267

## 2022-08-11 NOTE — THERAPY EVALUATION
Acute Care - Speech Language Pathology Initial Evaluation  [facility]   Cognitive-Communication Evaluation       Patient Name: Kamille Miles  : 1935  MRN: [id number]  Today's Date: 2022               Admit Date: 2022     Visit Dx:    ICD-10-CM ICD-9-CM   1. TIA (transient ischemic attack)  G45.9 435.9   2. Dysarthria  R47.1 784.51     Patient Active Problem List   Diagnosis   • Urinary frequency   • Light-headed feeling   • Mixed hyperlipidemia   • Senile osteoporosis   • Primary insomnia   • Nocturia   • Status post carpal tunnel release   • Perennial allergic rhinitis with seasonal variation   • Ankle edema, bilateral   • External hemorrhoid   • Osteoarthritis   • Sinus headache   • Benign essential hypertension   • Other fatigue   • Bilateral hearing loss due to cerumen impaction   • Heartburn   • Chronic pain of right knee   • Numbness and tingling of both feet   • Abnormal weight loss   • Gastroesophageal reflux disease without esophagitis   • TIA (transient ischemic attack)     Past Medical History:   Diagnosis Date   • Headache     cervicogenic -work-up by neurologist in [place], [place]   • Hypertension      History reviewed. No pertinent surgical history.    SLP Recommendation and Plan  SLP Diagnosis: Speech, language, and cognitive communication abilities are grossly functional per this eval. Inital concerns for dysarthria have resolved, motor speech fx is WFL. No acute needs at this time, SLP will sign off for SLC. Please re-consult if further concerns arise. (22)        SLC Criteria for Skilled Therapy Interventions Met: no problems identified which require skilled intervention (22)  Anticipated Discharge Disposition (SLP): unknown (22)                                    SLP EVALUATION (last 72 hours)     SLP SLC Evaluation     Row Name 22                   Communication Assessment/Intervention    Document Type evaluation  -         Subjective Information no complaints  -        Patient Observations alert;cooperative  -        Patient/Family/Caregiver Comments/Observations No family present  -        Patient Effort good  -        Symptoms Noted During/After Treatment none  -                  General Information    Patient Profile Reviewed yes  -        Pertinent History Of Current Problem Adm for further evaluation of transient dysarthria. Pt found to be COVID + 8/10. CTA of head negative for acute intracranial abnormalities. Hx: COVID, hypertension, cervivogenic headache, GERD, nocturia, OA  -        Precautions/Limitations, Vision WFL;for purposes of eval  -        Precautions/Limitations, Hearing WFL;for purposes of eval  -        Prior Level of Function-Communication unknown  -        Plans/Goals Discussed with patient;agreed upon  -        Barriers to Rehab none identified  -        Patient's Goals for Discharge patient did not state  -                  Pain    Additional Documentation Pain Scale: FACES Pre/Post-Treatment (Group)  -                  Pain Scale: FACES Pre/Post-Treatment    Pain: FACES Scale, Pretreatment 0-->no hurt  -        Posttreatment Pain Rating 0-->no hurt  -                  Comprehension Assessment/Intervention    Comprehension Assessment/Intervention Auditory Comprehension;Reading Comprehension  -                  Auditory Comprehension Assessment/Intervention    Auditory Comprehension (Communication) [facility]  -                  Reading Comprehension Assessment/Intervention    Reading Comprehension (Communication) [facility]                  Expression Assessment/Intervention    Expression Assessment/Intervention verbal expression;graphic expression  -                  Verbal Expression Assessment/Intervention    Verbal Expression [facility]                  Graphic Expression Assessment/Intervention    Graphic Expression [facility]                  Oral Musculature and Cranial Nerve  Assessment    Oral Motor General Assessment WFL  -                  Cognitive Assessment Intervention- SLP    Cognitive Function (Cognition) WFL  -                  SLP Evaluation Clinical Impressions    SLP Diagnosis Speech, language, and cognitive communication abilities are grossly functional per this eval. Inital concerns for dysarthria have resolved, motor speech fx is WFL. No acute needs at this time, SLP will sign off for SLC. Please re-consult if further concerns arise.  -        Rehab Potential/Prognosis good  -        SLC Criteria for Skilled Therapy Interventions Met no problems identified which require skilled intervention  -        Functional Impact no impact on function  -                  Recommendations    Therapy Frequency (SLP SLC) evaluation only  -        Anticipated Discharge Disposition (SLP) unknown  -              User Key  (r) = Recorded By, (t) = Taken By, (c) = Cosigned By    Initials Name Effective Dates    Lashonda Salazar MS, RADHA-SLP 06/22/22 -                    EDUCATION  The patient has been educated in the following areas:     Cognitive Impairment Communication Impairment.                      Time Calculation:      Time Calculation- SLP     Row Name 08/11/22 1543             Time Calculation- [facility]    SLP Start Time 1115  -      SLP Received On 08/11/22  -              Untimed Charges    [id number] Eval Speech and Production w/ Language Minutes 45  -MH              Total Minutes    Untimed Charges Total Minutes 45  -MH       Total Minutes 45  -MH            User Key  (r) = Recorded By, (t) = Taken By, (c) = Cosigned By    Initials Name Provider Type     Lashonda Fuchs MS, RADHA-SLP Speech and Language Pathologist                Therapy Charges for Today     Code Description Service Date Service Provider Modifiers Qty    56195452303 HC ST EVAL SPEECH AND PROD W LANG  3 8/11/2022 Lashonda Fuchs MS, RADHA-SLP GN 1                Patient was not wearing a  face mask and did not exhibit coughing during this therapy encounter.  Procedure performed was not aerosolizing, did not involve close contact (within 6 feet for at least 15 minutes or longer), and did not involve contact with infectious secretions or specimens.  Therapist used appropriate personal protective equipment including gloves, gown and CAPR.  Appropriate PPE was worn during the entire therapy session.  Hand hygiene was completed before and after therapy session.         Lashonda Fuchs MS, CFY-SLP  8/11/2022

## 2022-08-11 NOTE — PLAN OF CARE
Goal Outcome Evaluation:  Plan of Care Reviewed With: patient           Outcome Evaluation: Physical therapy consult received. PT evaluation complete. The patient independent with supine to sit, sit to stand and level ground ambulation in room. Patient presents near baseline for functional mobility. No acute PT needs identified. At hospital discharge recommend patient return home.

## 2022-08-11 NOTE — PLAN OF CARE
Goal Outcome Evaluation:   SLP evaluation completed. Will sign-off for speech, language, and cognitive communication. Please see note for further details and recommendations.

## 2022-08-11 NOTE — THERAPY EVALUATION
Patient Name: Kamille Miles  : 1935    MRN: [id number]                              Today's Date: 2022       Admit Date: 2022    Visit Dx:     ICD-10-CM ICD-9-CM   1. TIA (transient ischemic attack)  G45.9 435.9   2. Dysarthria  R47.1 784.51     Patient Active Problem List   Diagnosis   • Urinary frequency   • Light-headed feeling   • Mixed hyperlipidemia   • Senile osteoporosis   • Primary insomnia   • Nocturia   • Status post carpal tunnel release   • Perennial allergic rhinitis with seasonal variation   • Ankle edema, bilateral   • External hemorrhoid   • Osteoarthritis   • Sinus headache   • Benign essential hypertension   • Other fatigue   • Bilateral hearing loss due to cerumen impaction   • Heartburn   • Chronic pain of right knee   • Numbness and tingling of both feet   • Abnormal weight loss   • Gastroesophageal reflux disease without esophagitis   • TIA (transient ischemic attack)     Past Medical History:   Diagnosis Date   • Headache     cervicogenic -work-up by neurologist in [place], [place]   • Hypertension      History reviewed. No pertinent surgical history.   General Information     Row Name 22 1540          Physical Therapy Time and Intention    Document Type evaluation  -ML     Mode of Treatment physical therapy  -ML     Row Name 22 1540          General Information    Patient Profile Reviewed yes  -ML     Prior Level of Function independent:;all household mobility;community mobility;gait;transfer;ADL's;home management;driving  -ML     Existing Precautions/Restrictions no known precautions/restrictions  -ML     Barriers to Rehab none identified  -ML     Row Name 22 1540          Living Environment    People in Home spouse  -ML     Row Name 22 1540          Home Main Entrance    Number of Stairs, Main Entrance one  -ML     Row Name 22 1540          Stairs Within Home, Primary    Number of Stairs, Within Home, Primary none  -ML     Row Name  08/11/22 1540          Cognition    Orientation Status (Cognition) oriented x 4  -ML           User Key  (r) = Recorded By, (t) = Taken By, (c) = Cosigned By    Initials Name Provider Type    ML Colleen Hernadez Physical Therapist               Mobility     Row Name 08/11/22 1542          Bed Mobility    Bed Mobility supine-sit;sit-supine  -ML     Supine-Sit [place] (Bed Mobility) independent  -ML     Sit-Supine [place] (Bed Mobility) independent  -ML     Row Name 08/11/22 1542          Sit-Stand Transfer    Sit-Stand [place] (Transfers) independent  -ML     Comment, (Sit-Stand Transfer) Patient completes sit to stand transfer without BUE support.  -ML     Row Name 08/11/22 1542          Gait/Stairs (Locomotion)    [place] Level (Gait) independent  -ML     Distance in Feet (Gait) 80  -ML     Bilateral Gait Deviations forward flexed posture  -ML           User Key  (r) = Recorded By, (t) = Taken By, (c) = Cosigned By    Initials Name Provider Type    Colleen West Physical Therapist               Obj/Interventions     Row Name 08/11/22 1543          Range of Motion Comprehensive    General Range of Motion no range of motion deficits identified  -ML     [facility] Name 08/11/22 1543          Strength Comprehensive (MMT)    General Manual Muscle Testing (MMT) Assessment no strength deficits identified  -ML     Row Name 08/11/22 1543          Balance    Balance Assessment sitting static balance;sit to stand dynamic balance;standing static balance;sitting dynamic balance;standing dynamic balance  -ML     Static Sitting Balance independent  -ML     Dynamic Sitting Balance independent  -ML     Position, Sitting Balance unsupported;sitting edge of bed  -ML     Sit to Stand Dynamic Balance independent  -ML     Static Standing Balance independent  -ML     Dynamic Standing Balance independent  -ML     Position/Device Used, Standing Balance unsupported  -ML     Balance Interventions sitting;standing;sit to  stand;supported;static;dynamic  -ML     Row Name 08/11/22 1543          Sensory Assessment (Somatosensory)    Sensory Assessment (Somatosensory) sensation intact  -ML           User Key  (r) = Recorded By, (t) = Taken By, (c) = Cosigned By    Initials Name Provider Type    Colleen West Physical Therapist               Goals/Plan    No documentation.                Clinical Impression     Row Name 08/11/22 1544          Pain    Pretreatment Pain Rating 0/10 - no pain  -ML     Posttreatment Pain Rating 0/10 - no pain  -ML     Row Name 08/11/22 1544          Plan of Care Review    Plan of Care Reviewed With patient  -ML     Outcome Evaluation Physical therapy consult received. PT evaluation complete. The patient independent with supine to sit, sit to stand and level ground ambulation in room. Patient presents near baseline for functional mobility. No acute PT needs identified. At hospital discharge recommend patient return home.  -ML     Row Name 08/11/22 1544          Therapy Assessment/Plan (PT)    Patient/Family Therapy Goals Statement (PT) return home  -     Criteria for Skilled Interventions Met (PT) no;no problems identified which require skilled intervention  -     Therapy Frequency (PT) evaluation only  -ML     Row Name 08/11/22 1544          Vital Signs    Pre Patient Position Sitting  -ML     Intra Patient Position Standing  -ML     Post Patient Position Sitting  -ML     Row Name 08/11/22 1544          Positioning and Restraints    Pre-Treatment Position in bed  -ML     Post Treatment Position bed  -ML     In Bed notified nsg;call light within reach;encouraged to call for assist;sitting EOB  -ML           User Key  (r) = Recorded By, (t) = Taken By, (c) = Cosigned By    Initials Name Provider Type    Colleen West Physical Therapist               Outcome Measures     Row Name 08/11/22 1548          How much help from another person do you currently need...    Turning from your back to your side while  in flat bed without using bedrails? 4  -ML     Moving from lying on back to sitting on the side of a flat bed without bedrails? 4  -ML     Moving to and from a bed to a chair (including a wheelchair)? 4  -ML     Standing up from a chair using your arms (e.g., wheelchair, bedside chair)? 4  -ML     Climbing 3-5 steps with a railing? 4  -ML     To walk in hospital room? 4  -ML     AM-PAC 6 Clicks Score (PT) 24  -ML     Highest level of mobility 8 --> Walked 250 feet or more  -ML     Row Name 08/11/22 1548          Modified [place] Scale    Modified [place] Scale 0 - No Symptoms at all.  -ML     Row Name 08/11/22 1548          Functional Assessment    Outcome Measure Options AM-PAC 6 Clicks Basic Mobility (PT);Modified [place]  -ML           User Key  (r) = Recorded By, (t) = Taken By, (c) = Cosigned By    Initials Name Provider Type     Colleen Hernadez Physical Therapist                             Physical Therapy Education                 Title: PT OT SLP Therapies (Done)     Topic: Physical Therapy (Done)     Point: Mobility training (Done)     Learning Progress Summary           Patient Acceptance, E, VU by  at 8/11/2022 1548                   Point: Body mechanics (Done)     Learning Progress Summary           Patient Acceptance, E, VU by  at 8/11/2022 1548                   Point: Precautions (Done)     Learning Progress Summary           Patient Acceptance, E, VU by  at 8/11/2022 1548                               User Key     Initials Effective Dates Name Provider Type [facility] 04/22/21 -  Colleen Hernadez Physical Therapist PT              PT Recommendation and Plan     Plan of Care Reviewed With: patient  Outcome Evaluation: Physical therapy consult received. PT evaluation complete. The patient independent with supine to sit, sit to stand and level ground ambulation in room. Patient presents near baseline for functional mobility. No acute PT needs identified. At hospital discharge recommend patient  return home.     Time Calculation:    PT Charges     Row Name 08/11/22 1549             Time Calculation    Start Time 1525  -ML      PT Received On 08/11/22  -ML              Untimed Charges    PT Eval/Re-eval Minutes 46  -ML              Total Minutes    Untimed Charges Total Minutes 46  -ML       Total Minutes 46  -ML            User Key  (r) = Recorded By, (t) = Taken By, (c) = Cosigned By    Initials Name Provider Type    Colleen West Physical Therapist              Therapy Charges for Today     Code Description Service Date Service Provider Modifiers Qty    86103807283 HC PT EVAL LOW COMPLEXITY 4 8/11/2022 Colleen Hernadez GP 1          PT G-Codes  Outcome Measure Options: AM-PAC 6 Clicks Basic Mobility (PT), Modified [place]  AM-PAC 6 Clicks Score (PT): 24  Modified [place] Scale: 0 - No Symptoms at all.    Colleen Hernadez  8/11/2022

## 2022-08-12 ENCOUNTER — READMISSION MANAGEMENT (OUTPATIENT)
Dept: CALL CENTER | Facility: HOSPITAL | Age: 87
End: 2022-08-12

## 2022-08-12 VITALS
HEIGHT: 66 IN | DIASTOLIC BLOOD PRESSURE: 79 MMHG | BODY MASS INDEX: 19.29 KG/M2 | HEART RATE: 78 BPM | OXYGEN SATURATION: 95 % | RESPIRATION RATE: 18 BRPM | SYSTOLIC BLOOD PRESSURE: 152 MMHG | TEMPERATURE: 98 F | WEIGHT: 120 LBS

## 2022-08-12 PROBLEM — U07.1 COVID-19 VIRUS DETECTED: Status: ACTIVE | Noted: 2022-08-12

## 2022-08-12 PROBLEM — G45.9 TIA (TRANSIENT ISCHEMIC ATTACK): Status: RESOLVED | Noted: 2022-08-11 | Resolved: 2022-08-12

## 2022-08-12 LAB
ANION GAP SERPL CALCULATED.3IONS-SCNC: 9 MMOL/L (ref 5–15)
BUN SERPL-MCNC: 11 MG/DL (ref 8–23)
BUN/CREAT SERPL: 11.7 (ref 7–25)
CALCIUM SPEC-SCNC: 8.9 MG/DL (ref 8.6–10.5)
CHLORIDE SERPL-SCNC: 104 MMOL/L (ref 98–107)
CHOLEST SERPL-MCNC: 178 MG/DL (ref 0–200)
CO2 SERPL-SCNC: 28 MMOL/L (ref 22–29)
CREAT SERPL-MCNC: 0.94 MG/DL (ref 0.57–1)
DEPRECATED RDW RBC AUTO: 44.2 FL (ref 37–54)
EGFRCR SERPLBLD CKD-EPI 2021: 59.6 ML/MIN/1.73
ERYTHROCYTE [DISTWIDTH] IN BLOOD BY AUTOMATED COUNT: 13.2 % (ref 12.3–15.4)
GLUCOSE BLDC GLUCOMTR-MCNC: 82 MG/DL (ref 70–130)
GLUCOSE BLDC GLUCOMTR-MCNC: 95 MG/DL (ref 70–130)
GLUCOSE SERPL-MCNC: 91 MG/DL (ref 65–99)
HBA1C MFR BLD: 5.5 % (ref 4.8–5.6)
HCT VFR BLD AUTO: 38.8 % (ref 34–46.6)
HDLC SERPL-MCNC: 78 MG/DL (ref 40–60)
HGB BLD-MCNC: 13.3 G/DL (ref 12–15.9)
LDLC SERPL CALC-MCNC: 88 MG/DL (ref 0–100)
LDLC/HDLC SERPL: 1.12 {RATIO}
MCH RBC QN AUTO: 31.2 PG (ref 26.6–33)
MCHC RBC AUTO-ENTMCNC: 34.3 G/DL (ref 31.5–35.7)
MCV RBC AUTO: 91.1 FL (ref 79–97)
PLATELET # BLD AUTO: 171 10*3/MM3 (ref 140–450)
PMV BLD AUTO: 10.4 FL (ref 6–12)
POTASSIUM SERPL-SCNC: 3.8 MMOL/L (ref 3.5–5.2)
RBC # BLD AUTO: 4.26 10*6/MM3 (ref 3.77–5.28)
SODIUM SERPL-SCNC: 141 MMOL/L (ref 136–145)
TRIGL SERPL-MCNC: 65 MG/DL (ref 0–150)
VLDLC SERPL-MCNC: 12 MG/DL (ref 5–40)
WBC NRBC COR # BLD: 3.31 10*3/MM3 (ref 3.4–10.8)

## 2022-08-12 PROCEDURE — 96372 THER/PROPH/DIAG INJ SC/IM: CPT

## 2022-08-12 PROCEDURE — 99217 PR OBSERVATION CARE DISCHARGE MANAGEMENT: CPT | Performed by: FAMILY MEDICINE

## 2022-08-12 PROCEDURE — 97165 OT EVAL LOW COMPLEX 30 MIN: CPT

## 2022-08-12 PROCEDURE — 97110 THERAPEUTIC EXERCISES: CPT

## 2022-08-12 PROCEDURE — 85027 COMPLETE CBC AUTOMATED: CPT | Performed by: INTERNAL MEDICINE

## 2022-08-12 PROCEDURE — 99213 OFFICE O/P EST LOW 20 MIN: CPT

## 2022-08-12 PROCEDURE — 80048 BASIC METABOLIC PNL TOTAL CA: CPT | Performed by: INTERNAL MEDICINE

## 2022-08-12 PROCEDURE — 80061 LIPID PANEL: CPT

## 2022-08-12 PROCEDURE — 25010000002 ENOXAPARIN PER 10 MG: Performed by: INTERNAL MEDICINE

## 2022-08-12 PROCEDURE — 83036 HEMOGLOBIN GLYCOSYLATED A1C: CPT

## 2022-08-12 PROCEDURE — 82962 GLUCOSE BLOOD TEST: CPT

## 2022-08-12 PROCEDURE — G0378 HOSPITAL OBSERVATION PER HR: HCPCS

## 2022-08-12 RX ORDER — ATORVASTATIN CALCIUM 40 MG/1
40 TABLET, FILM COATED ORAL NIGHTLY
Status: DISCONTINUED | OUTPATIENT
Start: 2022-08-12 | End: 2022-08-12 | Stop reason: HOSPADM

## 2022-08-12 RX ORDER — ASPIRIN 325 MG
325 TABLET ORAL DAILY
Status: DISCONTINUED | OUTPATIENT
Start: 2022-08-12 | End: 2022-08-12 | Stop reason: HOSPADM

## 2022-08-12 RX ORDER — ASPIRIN 325 MG
325 TABLET ORAL DAILY
Qty: 90 TABLET | Refills: 1 | Status: SHIPPED | OUTPATIENT
Start: 2022-08-13

## 2022-08-12 RX ADMIN — Medication 10 ML: at 08:20

## 2022-08-12 RX ADMIN — ENOXAPARIN SODIUM 40 MG: 40 INJECTION SUBCUTANEOUS at 08:20

## 2022-08-12 RX ADMIN — Medication 10 ML: at 08:21

## 2022-08-12 RX ADMIN — ASPIRIN 325 MG ORAL TABLET 325 MG: 325 PILL ORAL at 08:20

## 2022-08-12 RX ADMIN — Medication 1 CAPSULE: at 08:20

## 2022-08-12 NOTE — DISCHARGE SUMMARY
[facility] Medicine Services  DISCHARGE SUMMARY    Patient Name: Kamille Miles  : 1935  MRN: [id number]    Date of Admission: 2022  6:22 AM  Date of Discharge:  22    Primary Care Physician: Brisa Lord MD    Consults     Date and Time Order Name Status Description    2022  9:50 AM Inpatient Neurology Consult Stroke Completed     2022  6:31 AM Inpatient Neurology Consult Stroke Completed           Hospital Course     Presenting Problem:   TIA (transient ischemic attack) [G45.9]    Active Hospital Problems    Diagnosis  POA   • COVID-19 virus detected [U07.1]  Yes      Resolved Hospital Problems    Diagnosis Date Resolved POA   • TIA (transient ischemic attack) [G45.9] 2022 Yes          Hospital Course:  Kamille Miles is a 87 y.o. female presented to [facility] ED due to acute onset dysarthria, fall and limited memory around events.   witnessed events, stated no focal weaknesses noted.  Patient known COVID+ and started Paxlovid 8/10/22 prescribed by PCP, her sx's have been only congestion and cough without hypoxia or concerning features.     Her evaluation by Stroke Neurology was consistent with TIA.  Her home ASA 81mg increased to 325mg, she will remain on simvistatin 10mg with plans for Neurology f/u in 6 weeks.        Day of Discharge     HPI:   Resolved dysarthria, no new deficients.       Vital Signs:   Temp:  [98 °F (36.7 °C)-98.4 °F (36.9 °C)] 98 °F (36.7 °C)  Heart Rate:  [] 75  Resp:  [18] 18  BP: (137-173)/(75-99) 152/82      Physical Exam:  Visual telemedicine encounter due to patient's current isolation requirements in the interest of PPE.    Constitutional: No acute distress, awake, alert, nontoxic, normal body habitus  Respiratory: Good effort, nonlabored respirations RA  Cardiovascular:  tele with NSR  Musculoskeletal: No edema, normal muscle tone and mass for age  Psychiatric: Appropriate affect, good insight and  judgement, cooperative  Neurologic: Oriented x 3, movements symmetric BUE and BLE, speech clear and fluent        Pertinent  and/or Most Recent Results     LAB RESULTS:      Lab 08/12/22  0346 08/11/22  0741 08/11/22  0658 08/11/22  0637   WBC 3.31*  --  5.54  --    HEMOGLOBIN 13.3  --  13.4  --    HEMOGLOBIN, POC  --   --   --  12.9   HEMATOCRIT 38.8  --  39.0  --    HEMATOCRIT POC  --   --   --  38   PLATELETS 171  --  180  --    NEUTROS ABS  --   --  2.40  --    IMMATURE GRANS (ABS)  --   --  0.01  --    LYMPHS ABS  --   --  2.20  --    MONOS ABS  --   --  0.90  --    EOS ABS  --   --  0.01  --    MCV 91.1  --  91.8  --    APTT  --  30.3  --   --          Lab 08/12/22  0346 08/11/22  0637   SODIUM 141  --    POTASSIUM 3.8  --    CHLORIDE 104  --    CO2 28.0  --    ANION GAP 9.0  --    BUN 11  --    CREATININE 0.94 0.80   EGFR 59.6* 72.3   GLUCOSE 91  --    CALCIUM 8.9  --    HEMOGLOBIN A1C 5.50  --          Lab 08/11/22  0741   ALT (SGPT) 11   AST (SGOT) 20         Lab 08/11/22  0741   TROPONIN T <0.010         Lab 08/12/22  0346   CHOLESTEROL 178   LDL CHOL 88   HDL CHOL 78*   TRIGLYCERIDES 65             Brief Urine Lab Results  (Last result in the past 365 days)      Color   Clarity   Blood   Leuk Est   Nitrite   Protein   CREAT   Urine HCG        08/11/22 0743 Yellow   Clear   Negative   Negative   Negative   Negative               Microbiology Results (last 10 days)     Procedure Component Value - Date/Time    COVID PRE-OP / PRE-PROCEDURE SCREENING ORDER (NO ISOLATION) - Swab, Nasopharynx [[id number]]  (Abnormal) Collected: 08/11/22 1519    Lab Status: Final result Specimen: Swab from Nasopharynx Updated: 08/11/22 1643    Narrative:      The following orders were created for panel order COVID PRE-OP / PRE-PROCEDURE SCREENING ORDER (NO ISOLATION) - Swab, Nasopharynx.  Procedure                               Abnormality         Status                     ---------                               -----------          ------                     COVID-19, FLU A/B, RSV P...[[id number]]  Abnormal            Final result                 Please view results for these tests on the individual orders.    COVID-19, FLU A/B, RSV PCR - Swab, Nasopharynx [[id number]]  (Abnormal) Collected: 08/11/22 4852    Lab Status: Final result Specimen: Swab from Nasopharynx Updated: 08/11/22 1643     COVID19 Detected     Influenza A PCR Not Detected     Influenza B PCR Not Detected     RSV, PCR Not Detected    Narrative:      Fact sheet for providers: https://www.fda.gov/media/362775/download    Fact sheet for patients: https://www.fda.gov/media/219284/download    Test performed by PCR.          Adult Transthoracic Echo Complete W/ Cont if Necessary Per Protocol (With Agitated Saline)    Result Date: 8/11/2022  · Left ventricular ejection fraction appears to be 56 - 60%. · Left ventricular diastolic function is consistent with (grade I) impaired relaxation. · No significant valvular disease      CT Angiogram Neck    Result Date: 8/11/2022  Examination: CT ANGIOGRAM HEAD W AI ANALYSIS OF LVO-, CT ANGIOGRAM NECK-  Date of Exam: 8/11/2022 6:49 AM  Indication: Dysarthria.  Comparison: None available.  Technique: Axial volumetric contrast-enhanced CT angiographic images of the head and neck were acquired utilizing 115 mL Isovue-370  IV contrast. 3-D reconstructions were created for interpretation. Automated exposure control and iterative reconstruction methods were used. AI analysis of LVO was utilized for the CTA Head imaging portion of the study.   Findings: Aortic arch: The aortic arch is unremarkable.  There is conventional 3 vessel arch anatomy.  The right brachiocephalic and visualized bilateral subclavian arteries are within normal limits.  Right carotid: The right CCA arises as expected from the brachiocephalic trunk.  The CCA follows a normal course and appears normal caliber. There is mild calcified plaque at the carotid bifurcation. The external  carotid artery and distal branches appear within normal limits.  The cervical internal carotid artery follows a normal course and appears normal caliber throughout the neck and into the head.  The intracranial ICA segments appear within normal limits.  The ophthalmic artery origin is normal.  The A1 and M1 segments appear within normal limits.  The visualized distal MARIANN and MCA branches appear patent.  There is  a patent  anterior communicating artery. There is a small patent posterior communicating artery.  Left carotid: The left CCA arises as expected from the aortic arch.  The CCA follows a normal course and appears normal caliber.  There is mild calcified plaque at the carotid bifurcation without stenosis. The external carotid artery and distal branches appear within normal limits.  The cervical internal carotid artery follows a normal course and appears normal caliber throughout the neck and into the head.  The intracranial ICA segments appear within normal limits.  The ophthalmic artery origin is normal.  The A1 and M1 segments appear within normal limits.  The visualized distal MARIANN and MCA branches appear patent. There is a large patent posterior communicating artery.  Posterior circulation: Vertebral arteries arise as expected from ipsilateral subclavian arteries.  The vertebral arteries are codominant.  The vertebral arteries follow a normal course and appear normal caliber throughout the neck and into the head.  The V4 segments are patent. Visualized posterior inferior cerebellar arteries are within normal limits.  The basilar artery is normal caliber.  Superior cerebellar arteries are patent.  Bilateral P1 segments and posterior cerebral arteries appear within normal limits.  Nonvascular findings: Intracranial structures appear unremarkable. There is thinning of the orbital lenses bilaterally. Paranasal sinuses and mastoid air cells appear well aerated.  Superficial soft tissues and underlying musculature  appear within normal limits.  There is dense scarring and parenchymal calcification and pleural thickening in the lung apices. There is mild right upper lobe bronchiectasis. The thyroid and salivary glands appear unremarkable.  The suprahyoid and infrahyoid spaces of the neck appear unremarkable.  There is no evidence of cervical lymphadenopathy or a neck mass.  There are no acute osseous abnormalities or destructive bone lesions.       No significant vascular abnormality. No significant stenosis, aneurysm or dissection.  This report was finalized on 8/11/2022 7:29 AM by Gumaro Ponce MD.      MRI Brain Without Contrast    Result Date: 8/11/2022  DATE OF EXAM: 8/11/2022 9:25 PM  PROCEDURE: MRI BRAIN WO CONTRAST-  INDICATIONS: Stroke, follow up; G45.9-Transient cerebral ischemic attack, unspecified; R47.1-Dysarthria and anarthria  COMPARISON: No comparisons available.  TECHNIQUE: Multiplanar multisequence images of the brain were performed without contrast according to routine brain MRI protocol.  FINDINGS: No acute infarct is present on diffusion weighted sequences. Midline structures are unremarkable and the craniocervical junction is satisfactory in appearance. Mild age-related changes are present, with some generalized volume loss in addition to typical white matter sequela of chronic small vessel ischemia. There is otherwise no evidence of intracranial hemorrhage, mass or mass effect. Prominent bilateral perivascular spaces are present, incidental finding. The ventricles are normal in size and configuration. The orbits are unremarkable and the paranasal sinuses are grossly clear.      Age-related changes are present, otherwise without evidence of acute infarct, hemorrhage, mass or mass effect.  This report was finalized on 8/11/2022 9:54 PM by Yuval Matthews.      XR Chest 1 View    Result Date: 8/11/2022  Examination: XR CHEST 1 VW-  Date of Exam: 8/11/2022 7:28 AM  Indication: Acute Stroke Protocol (onset <  12 hrs).  Comparison: 6/1/2021.  Technique: Single radiographic view of the chest was obtained.  Findings: Lungs remain hyperexpanded. There is no pneumothorax, pleural effusion or focal airspace consolidation. Cardiomediastinal silhouette is unremarkable. Pulmonary vasculature appears within normal limits. Regional bones appear grossly intact.      No acute cardiopulmonary abnormality.  This report was finalized on 8/11/2022 7:44 AM by Gumaro Ponce MD.      CT Head Without Contrast Stroke Protocol    Result Date: 8/11/2022  Examination: CT HEAD WO CONTRAST STROKE PROTOCOL-  Date of Exam: 8/11/2022 6:32 AM  Indication: Neuro deficit, acute, stroke suspected.  Comparison: None available.  Technique: Axial noncontrast CT imaging of the head was performed. Automated exposure control and iterative reconstruction methods were used.  Findings: There are prominent perivascular spaces in the inferior basal ganglia. There is mild patchy periventricular white matter hypoattenuation. Superficial soft tissues appear within normal limits. The calvarium is intact.  Paranasal sinuses and mastoid air cells appear well aerated. Orbits are unremarkable.  There is no acute intracranial hemorrhage.  No mass effect or midline shift.  No abnormal extra-axial collections. Gray-white differentiation is within normal limits.  There are no focal hypoattenuating lesions.  Ventricular size and configuration is normal for age.       1. No acute intracranial abnormality. 2. Mild chronic small vessel ischemic change.  This report was finalized on 8/11/2022 7:17 AM by Gumaro Ponce MD.      CT Angiogram Head w AI Analysis of LVO    Result Date: 8/11/2022  Examination: CT ANGIOGRAM HEAD W AI ANALYSIS OF LVO-, CT ANGIOGRAM NECK-  Date of Exam: 8/11/2022 6:49 AM  Indication: Dysarthria.  Comparison: None available.  Technique: Axial volumetric contrast-enhanced CT angiographic images of the head and neck were acquired utilizing 115 mL Isovue-370   IV contrast. 3-D reconstructions were created for interpretation. Automated exposure control and iterative reconstruction methods were used. AI analysis of LVO was utilized for the CTA Head imaging portion of the study.   Findings: Aortic arch: The aortic arch is unremarkable.  There is conventional 3 vessel arch anatomy.  The right brachiocephalic and visualized bilateral subclavian arteries are within normal limits.  Right carotid: The right CCA arises as expected from the brachiocephalic trunk.  The CCA follows a normal course and appears normal caliber. There is mild calcified plaque at the carotid bifurcation. The external carotid artery and distal branches appear within normal limits.  The cervical internal carotid artery follows a normal course and appears normal caliber throughout the neck and into the head.  The intracranial ICA segments appear within normal limits.  The ophthalmic artery origin is normal.  The A1 and M1 segments appear within normal limits.  The visualized distal MARIANN and MCA branches appear patent.  There is  a patent  anterior communicating artery. There is a small patent posterior communicating artery.  Left carotid: The left CCA arises as expected from the aortic arch.  The CCA follows a normal course and appears normal caliber.  There is mild calcified plaque at the carotid bifurcation without stenosis. The external carotid artery and distal branches appear within normal limits.  The cervical internal carotid artery follows a normal course and appears normal caliber throughout the neck and into the head.  The intracranial ICA segments appear within normal limits.  The ophthalmic artery origin is normal.  The A1 and M1 segments appear within normal limits.  The visualized distal MARIANN and MCA branches appear patent. There is a large patent posterior communicating artery.  Posterior circulation: Vertebral arteries arise as expected from ipsilateral subclavian arteries.  The vertebral  arteries are codominant.  The vertebral arteries follow a normal course and appear normal caliber throughout the neck and into the head.  The V4 segments are patent. Visualized posterior inferior cerebellar arteries are within normal limits.  The basilar artery is normal caliber.  Superior cerebellar arteries are patent.  Bilateral P1 segments and posterior cerebral arteries appear within normal limits.  Nonvascular findings: Intracranial structures appear unremarkable. There is thinning of the orbital lenses bilaterally. Paranasal sinuses and mastoid air cells appear well aerated.  Superficial soft tissues and underlying musculature appear within normal limits.  There is dense scarring and parenchymal calcification and pleural thickening in the lung apices. There is mild right upper lobe bronchiectasis. The thyroid and salivary glands appear unremarkable.  The suprahyoid and infrahyoid spaces of the neck appear unremarkable.  There is no evidence of cervical lymphadenopathy or a neck mass.  There are no acute osseous abnormalities or destructive bone lesions.       No significant vascular abnormality. No significant stenosis, aneurysm or dissection.  This report was finalized on 8/11/2022 7:29 AM by Gumaro Ponce MD.      CT CEREBRAL PERFUSION WITH & WITHOUT CONTRAST    Result Date: 8/11/2022  DATE OF EXAM: 8/11/2022 6:49 AM  PROCEDURE: CT CEREBRAL PERFUSION W WO CONTRAST-  INDICATIONS: Neuro deficit, acute, stroke suspected  COMPARISON: No comparisons available.  TECHNIQUE: Routine transaxial cuts were obtained through the head without administration of contrast. Routine transaxial cuts were then obtained through the head following the intravenous administration of 115 mL of Isovue 370. Core blood volume, core blood flow, mean transit time, and Tmax images were obtained utilizing the Rapid software protocol. A limited CT angiogram of the head was also performed to measure the blood vessel density.  The radiation  dose reduction device was turned on for each scan per the ALARA (As Low as Reasonably Achievable) protocol.  FINDINGS: There are no perfusion abnormalities in the brain. No evidence of core infarct or brain embolus.      No significant perfusion abnormalities in the brain.  This report was finalized on 8/11/2022 7:20 AM by Gumaro Ponce MD.                Results for orders placed during the hospital encounter of 08/11/22    Adult Transthoracic Echo Complete W/ Cont if Necessary Per Protocol (With Agitated Saline)    Interpretation Summary  · Left ventricular ejection fraction appears to be 56 - 60%.  · Left ventricular diastolic function is consistent with (grade I) impaired relaxation.  · No significant valvular disease      Discharge Details        Discharge Medications      Changes to Medications      Instructions Start Date   aspirin 325 MG tablet  What changed:   medication strength  how much to take   325 mg, Oral, Daily   Start Date: August 13, 2022        Continue These Medications      Instructions Start Date   Calcium 500-100 MG-UNIT chewable tablet   1 tablet, Oral, Every 4 Hours PRN      cholecalciferol 25 MCG (1000 UT) tablet  Commonly known as: VITAMIN D3   1,000 Units, Oral, Daily      cyanocobalamin 500 MCG tablet  Commonly known as: VITAMIN B-12   500 mcg, Oral, Daily      famotidine 20 MG tablet  Commonly known as: PEPCID   20 mg, Oral, Nightly      fluticasone 50 MCG/ACT nasal spray  Commonly known as: FLONASE   1 spray, Nasal, 2 Times Daily      Hydrocortisone (Perianal) 2.5 % rectal cream  Commonly known as: ANUSOL-HC   Rectal, 2 Times Daily PRN      hydrocortisone 1 % cream   Topical, 2 Times Daily PRN      ketoconazole 2 % cream  Commonly known as: NIZORAL   1 application, Topical, Daily      lisinopril 5 MG tablet  Commonly known as: PRINIVIL,ZESTRIL   5 mg, Oral, Daily      Nirmatrelvir & Ritonavir 20 x 150 MG & 10 x 100MG tablet therapy pack tablet  Commonly known as: PAXLOVID   3 each,  Oral, 2 Times Daily      simvastatin 10 MG tablet  Commonly known as: ZOCOR   10 mg, Oral, Every Evening      TRUBIOTICS PO   1 capsule, Oral, Daily, Taking QOD      zolpidem 10 MG tablet  Commonly known as: AMBIEN   Take 1/2 to 1 tablet every evening as needed             No Known Allergies      Discharge Disposition:  Home or Self Care    Diet:  Hospital:  Diet Order   Procedures   • Diet Regular; Cardiac            CODE STATUS:    Code Status and Medical Interventions:   Ordered at: 08/11/22 1220     Level Of Support Discussed With:    Patient     Code Status (Patient has no pulse and is not breathing):    CPR (Attempt to Resuscitate)     Medical Interventions (Patient has pulse or is breathing):    Full Support       No future appointments.    Additional Instructions for the Follow-ups that You Need to Schedule     Discharge Follow-up with PCP   As directed       Currently Documented PCP:    Brisa Lord MD    PCP Phone Number:    [phone]     Follow Up Details: within 2 weeks - new dx TIA's         Discharge Follow-up with Specified Provider: [facility]; 6 Weeks   As directed      To: [facility]    Follow Up: 6 Weeks                     Skyla Madison MD  08/12/22      Time Spent on Discharge:  I spent  35  minutes on this discharge activity which included: face-to-face encounter with the patient, reviewing the data in the system, coordination of the care with the nursing staff as well as consultants, documentation, and entering orders.

## 2022-08-12 NOTE — PLAN OF CARE
Goal Outcome Evaluation:  Plan of Care Reviewed With: patient           Outcome Evaluation: OT eval completed. Pt presents at baseline for ADL performance with no problems identified warranting IP OT services. Pt performed toilet tx/toileting tasks and grooming standing sink side independently, ambulated in room with no AD. Pt demonstrated no coordination or unilateral strength deficits with bimanual tasks, no LOB with dynamic standing activity. Education provided regarding pulmonary TE with pt demonstrating good teachback. Recommend home at discharge.

## 2022-08-12 NOTE — PLAN OF CARE
Goal Outcome Evaluation:              Outcome Evaluation: Pt has rested well in bed throughout shift. Went for MRI this shift. Pt alert and oriented x4. VSS. Up ad mariela. Pt voices no needs at this times, call light within reach.

## 2022-08-12 NOTE — OUTREACH NOTE
Prep Survey    Flowsheet Row Responses   [facility] patient discharged from? [place]   Is LACE score < 7 ? Yes   Emergency Room discharge w/ pulse ox? No   Eligibility [facility]   Date of Admission 08/11/22   Date of Discharge 08/12/22   Discharge Disposition Home or Self Care   Discharge diagnosis Covid and TIA   Does the patient have one of the following disease processes/diagnoses(primary or secondary)? Stroke (TIA)   Does the patient have Home health ordered? No   Is there a DME ordered? No   Prep survey completed? Yes          BUNNY GREGORY - Registered Nurse

## 2022-08-12 NOTE — CASE MANAGEMENT/SOCIAL WORK
Discharge Planning Assessment  [facility]     Patient Name: Kamille Miles  MRN: [id number]  Today's Date: 8/12/2022    Admit Date: 8/11/2022     Discharge Needs Assessment     Row Name 08/12/22 0947       Living Environment    People in Home spouse    Current Living Arrangements home    Living Arrangement Comments Lives in [facility] w/ spouse in a home.       Discharge Needs Assessment    Equipment Currently Used at Home none               Discharge Plan     Row Name 08/12/22 0948       Plan    Plan Home    Patient/Family in Agreement with Plan yes    Plan Comments Attempted to call pt on hospital phone, no answer. CM spoke w/spouse. Insurance confirmed. Fills scripts @ [facility] on [facility][note 3]. Not current w/HH or DME. Spouse will transport home. No other d/c needs verbalized @ this time.    Final Discharge Disposition Code 01 - home or self-care    Row Name 08/12/22 0806       Plan    Final Discharge Disposition Code 01 - home or self-care              Continued Care and Services - Admitted Since 8/11/2022    Coordination has not been started for this encounter.       Expected Discharge Date and Time     Expected Discharge Date Expected Discharge Time    Aug 13, 2022          Demographic Summary     Row Name 08/12/22 0946       General Information    Admission Type observation    Arrived From home    Referral Source emergency department    Preferred Language English    General Information Comments POA and LW paperwork, PCP is Dr Brisa Lord               Functional Status     Row Name 08/12/22 0947       Functional Status    Usual Activity Tolerance good       Functional Status, IADL    Medications independent    Meal Preparation independent    Housekeeping independent    Laundry independent    Shopping independent               Psychosocial    No documentation.                Abuse/Neglect    No documentation.                Legal    No documentation.                Substance Abuse    No  documentation.                Patient Forms    No documentation.                   Micheal Gonzalez RN

## 2022-08-12 NOTE — THERAPY DISCHARGE NOTE
Acute Care - Occupational Therapy Discharge  [facility]    Patient Name: Kamille Miles  : 1935    MRN: [id number]                              Today's Date: 2022       Admit Date: 2022    Visit Dx:     ICD-10-CM ICD-9-CM   1. TIA (transient ischemic attack)  G45.9 435.9   2. Dysarthria  R47.1 784.51     Patient Active Problem List   Diagnosis   • Urinary frequency   • Light-headed feeling   • Mixed hyperlipidemia   • Senile osteoporosis   • Primary insomnia   • Nocturia   • Status post carpal tunnel release   • Perennial allergic rhinitis with seasonal variation   • Ankle edema, bilateral   • External hemorrhoid   • Osteoarthritis   • Sinus headache   • Benign essential hypertension   • Other fatigue   • Bilateral hearing loss due to cerumen impaction   • Heartburn   • Chronic pain of right knee   • Numbness and tingling of both feet   • Abnormal weight loss   • Gastroesophageal reflux disease without esophagitis   • TIA (transient ischemic attack)     Past Medical History:   Diagnosis Date   • Headache     cervicogenic -work-up by neurologist in [place], [place]   • Hypertension      History reviewed. No pertinent surgical history.   General Information     Row Name 22 1017          OT Time and Intention    Document Type discharge evaluation/summary  -LOUISE     Mode of Treatment occupational therapy  -LOUISE     Row Name 22 1017          General Information    Patient Profile Reviewed yes  -LOUISE     Prior Level of Function independent:;ADL's;bed mobility;transfer;all household mobility;community mobility;home management;driving;shopping  -LOUISE     Existing Precautions/Restrictions other (see comments)  Contact and airborne (COVID)  -LOUISE     Barriers to Rehab none identified  -LOUISE     Row Name 22 1017          Occupational Profile    Environmental Supports and Barriers (Occupational Profile) Lives with spouse in multi-level home, master bedroom/bath on 1st floor, ambulates without AD at  baseline, has walk-in shower with grab bars.  -     Row Name 08/12/22 1017          Living Environment    People in Home spouse  -     Row Name 08/12/22 1017          Home Main Entrance    Number of Stairs, Main Entrance one  -LOUISE     Stair Railings, Main Entrance none  -     Row Name 08/12/22 1017          Stairs Within Home, Primary    Stairs, Within Home, Primary 1st floor bedroom/bathroom  -     Number of Stairs, Within Home, Primary twelve  -LOUISE     Stair Railings, Within Home, Primary railings on both sides of stairs  -LOUISE     Row Name 08/12/22 1017          Cognition    Orientation Status (Cognition) oriented x 4  -     Row Name 08/12/22 1017          Safety Issues, Functional Mobility    Comment, Safety Issues/Impairments (Mobility) no safety concerns noted  -           User Key  (r) = Recorded By, (t) = Taken By, (c) = Cosigned By    Initials Name Provider Type    Caitlyn Crawford OT Occupational Therapist               Mobility/ADL's     Row Name 08/12/22 1020          Bed Mobility    Bed Mobility supine-sit;sit-supine  -LOUISE     Supine-Sit Jones (Bed Mobility) modified independence  -LOUISE     Sit-Supine Jones (Bed Mobility) modified independence  -LOUISE     Assistive Device (Bed Mobility) head of bed elevated  -     Comment, (Bed Mobility) completed without difficulty  -     Row Name 08/12/22 1020          Transfers    Transfers sit-stand transfer;stand-sit transfer;toilet transfer  -     Sit-Stand Jones (Transfers) independent  -LOUISE     Stand-Sit Jones (Transfers) independent  -LOUISE     Jones Level (Toilet Transfer) modified independence  -     Assistive Device (Toilet Transfer) commode;grab bars/safety frame  -     Row Name 08/12/22 1020          Sit-Stand Transfer    Comment, (Sit-Stand Transfer) No AD  -     Row Name 08/12/22 1020          Toilet Transfer    Type (Toilet Transfer) stand-sit;sit-stand;stand pivot/stand step  -     Comment, (Toilet  Transfer) completed without difficulty  -[facility] Name 08/12/22 1020          Functional Mobility    Functional Mobility- Ind. Level conditional independence  -LOUISE     Functional Mobility-Distance (Feet) 25  -LOUISE     Functional Mobility- Comment Pt ambulated to/from bathroom without unsteadiness or LOB  -[facility] Name 08/12/22 1020          Activities of Daily Living    BADL Assessment/Intervention lower body dressing;grooming;toileting  -LOUISE     Row Name 08/12/22 1020          Lower Body Dressing Assessment/Training    [place] Level (Lower Body Dressing) don;shoes/slippers;independent  -LOUISE     Position (Lower Body Dressing) edge of bed sitting  -[facility] Name 08/12/22 1020          Grooming Assessment/Training    [place] Level (Grooming) oral care regimen;wash face, hands;independent  -LOUISE     Position (Grooming) sink side;unsupported standing  -LOUISE     Row Name 08/12/22 1020          Toileting Assessment/Training    [place] Level (Toileting) adjust/manage clothing;perform perineal hygiene;independent  -     Assistive Devices (Toileting) commode;grab bar/safety frame  -     Position (Toileting) unsupported standing  -           User Key  (r) = Recorded By, (t) = Taken By, (c) = Cosigned By    Initials Name Provider Type    Caitlyn Crawford OT Occupational Therapist               Obj/Interventions     [facility] Name 08/12/22 1023          Sensory Assessment (Somatosensory)    Sensory Assessment (Somatosensory) UE sensation intact  -LOUISE     Row Name 08/12/22 1023          Vision Assessment/Intervention    Visual Impairment/Limitations WFL  -LOUISE     Row Name 08/12/22 1023          Range of Motion Comprehensive    General Range of Motion bilateral upper extremity ROM WFL  -[facility] Name 08/12/22 1023          Strength Comprehensive (MMT)    General Manual Muscle Testing (MMT) Assessment no strength deficits identified  -LOUISE     Row Name 08/12/22 1023          Shoulder (Therapeutic Exercise)    Shoulder  (Therapeutic Exercise) AROM (active range of motion)  -     Shoulder AROM (Therapeutic Exercise) bilateral;flexion;extension;scapular protraction;scapular retraction;standing;10 repetitions;other (see comments)  lateral trunk stretches  -[facility] Name 08/12/22 1023          Motor Skills    Therapeutic Exercise shoulder  -           User Key  (r) = Recorded By, (t) = Taken By, (c) = Cosigned By    Initials Name Provider Type    Caitlyn Crawford OT Occupational Therapist               Goals/Plan    No documentation.                Clinical Impression     [facility] Name 08/12/22 1024          Pain Assessment    Pretreatment Pain Rating 0/10 - no pain  -LOUISE     Posttreatment Pain Rating 0/10 - no pain  -[facility] Name 08/12/22 1024          Plan of Care Review    Plan of Care Reviewed With patient  -     Outcome Evaluation OT eval completed. Pt presents at baseline for ADL performance with no problems identified warranting IP OT services. Pt performed toilet tx/toileting tasks and grooming standing sink side independently, ambulated in room with no AD. Pt demonstrated no coordination or unilateral strength deficits with bimanual tasks, no LOB with dynamic standing activity. Education provided regarding pulmonary TE with pt demonstrating good teachback. Recommend home at discharge.  -[facility] Name 08/12/22 1024          Therapy Assessment/Plan (OT)    Therapy Frequency (OT) evaluation only  -[facility] Name 08/12/22 1024          Therapy Plan Review/Discharge Plan (OT)    Anticipated Discharge Disposition (OT) home  -[facility] Name 08/12/22 1024          Vital Signs    Post Systolic BP Rehab 135  -LOUISE     Post Treatment Diastolic BP 86  -LOUISE     Pretreatment Heart Rate (beats/min) 78  -LOUISE     Posttreatment Heart Rate (beats/min) 87  -LOUISE     Pre SpO2 (%) 98  -LOUISE     O2 Delivery Pre Treatment room air  -LOUISE     O2 Delivery Intra Treatment room air  -LOUISE     Post SpO2 (%) 97  -LOUISE     O2 Delivery Post Treatment room air   -LOUISE     Pre Patient Position Supine  -LOUISE     Intra Patient Position Standing  -LOUISE     Post Patient Position Sitting  -LOUISE     Row Name 08/12/22 1024          Positioning and Restraints    Pre-Treatment Position in bed  -LOUISE     Post Treatment Position bed  -LOUISE     In Bed notified nsg;sitting EOB;call light within reach  -LOUISE           User Key  (r) = Recorded By, (t) = Taken By, (c) = Cosigned By    Initials Name Provider Type    Caitlyn Crawford OT Occupational Therapist               Outcome Measures     Row Name 08/12/22 1034          How much help from another is currently needed...    Putting on and taking off regular lower body clothing? 4  -LOUISE     Bathing (including washing, rinsing, and drying) 4  -LOUISE     Toileting (which includes using toilet bed pan or urinal) 4  -LOUISE     Putting on and taking off regular upper body clothing 4  -LOUISE     Taking care of personal grooming (such as brushing teeth) 4  -LOUISE     Eating meals 4  -LOUISE     AM-PAC 6 Clicks Score (OT) 24  -LOUISE     Row Name 08/12/22 0805          How much help from another person do you currently need...    Turning from your back to your side while in flat bed without using bedrails? 4  -EM     Moving from lying on back to sitting on the side of a flat bed without bedrails? 4  -EM     Moving to and from a bed to a chair (including a wheelchair)? 4  -EM     Standing up from a chair using your arms (e.g., wheelchair, bedside chair)? 4  -EM     Climbing 3-5 steps with a railing? 4  -EM     To walk in hospital room? 4  -EM     AM-PAC 6 Clicks Score (PT) 24  -EM     Highest level of mobility 8 --> Walked 250 feet or more  -EM     Row Name 08/12/22 1034          Modified Caldwell Scale    Pre-Stroke Modified Caldwell Scale 0 - No Symptoms at all.  -LOUISE     Modified Enoc Scale 0 - No Symptoms at all.  -LOUISE     Row Name 08/12/22 1034          Functional Assessment    Outcome Measure Options AM-PAC 6 Clicks Daily Activity (OT)  -LOUISE           User Key  (r) = Recorded By,  (t) = Taken By, (c) = Cosigned By    Initials Name Provider Type    EM Mindi Carreon, RN Registered Nurse    Caitlyn Crawford OT Occupational Therapist              Occupational Therapy Education                 Title: PT OT SLP Therapies (In Progress)     Topic: Occupational Therapy (In Progress)     Point: ADL training (Done)     Description:   Instruct learner(s) on proper safety adaptation and remediation techniques during self care or transfers.   Instruct in proper use of assistive devices.              Learning Progress Summary           Patient Acceptance, E,TB,D, VU,DU by LOUISE at 8/12/2022 1034    Comment: Role of OT; pulmonary TE                   Point: Home exercise program (Done)     Description:   Instruct learner(s) on appropriate technique for monitoring, assisting and/or progressing therapeutic exercises/activities.              Learning Progress Summary           Patient Acceptance, E,TB,D, VU,DU by LOUISE at 8/12/2022 1034    Comment: Role of OT; pulmonary TE                   Point: Precautions (Not Started)     Description:   Instruct learner(s) on prescribed precautions during self-care and functional transfers.              Learner Progress:  Not documented in this visit.          Point: Body mechanics (Not Started)     Description:   Instruct learner(s) on proper positioning and spine alignment during self-care, functional mobility activities and/or exercises.              Learner Progress:  Not documented in this visit.                      User Key     Initials Effective Dates Name Provider Type Discipline    LOUISE 06/16/21 -  Caitlyn Gonzales OT Occupational Therapist OT              OT Recommendation and Plan  Therapy Frequency (OT): evaluation only  Plan of Care Review  Plan of Care Reviewed With: patient  Outcome Evaluation: OT eval completed. Pt presents at baseline for ADL performance with no problems identified warranting IP OT services. Pt performed toilet tx/toileting tasks and grooming  standing sink side independently, ambulated in room with no AD. Pt demonstrated no coordination or unilateral strength deficits with bimanual tasks, no LOB with dynamic standing activity. Education provided regarding pulmonary TE with pt demonstrating good teachback. Recommend home at discharge.  Plan of Care Reviewed With: patient  Outcome Evaluation: OT eval completed. Pt presents at baseline for ADL performance with no problems identified warranting IP OT services. Pt performed toilet tx/toileting tasks and grooming standing sink side independently, ambulated in room with no AD. Pt demonstrated no coordination or unilateral strength deficits with bimanual tasks, no LOB with dynamic standing activity. Education provided regarding pulmonary TE with pt demonstrating good teachback. Recommend home at discharge.     Time Calculation:    Time Calculation- OT     Row Name 08/12/22 0945             Time Calculation- OT    OT Start Time 0945  -LOUISE      OT Received On 08/12/22  -LOUISE      OT Goal Re-Cert Due Date 08/22/22  -LOUISE              Timed Charges    68919 - OT Therapeutic Exercise Minutes 10  -LOUISE              Untimed Charges    OT Eval/Re-eval Minutes 30  -LOUISE              Total Minutes    Timed Charges Total Minutes 10  -LOUISE      Untimed Charges Total Minutes 30  -LOUISE       Total Minutes 40  -LOUISE            User Key  (r) = Recorded By, (t) = Taken By, (c) = Cosigned By    Initials Name Provider Type    Caitlyn Crawford OT Occupational Therapist              Therapy Charges for Today     Code Description Service Date Service Provider Modifiers Qty    91911646660  OT THER PROC EA 15 MIN 8/12/2022 Caitlyn Gonzales OT GO 1    [id number]  OT EVAL LOW COMPLEXITY 2 8/12/2022 Caitlyn Gonzales OT GO 1             OT Discharge Summary  Anticipated Discharge Disposition (OT): home    Caitlyn Gonzales OT  8/12/2022

## 2022-08-12 NOTE — PROGRESS NOTES
Stroke Progress Note       Chief Complaint:  Transient dysarthria    Subjective    Subjective     Subjective:  No acute events overnight.  The patient denies recurrent symptoms.  She feels at her neurological baseline and is ready to go home.  She tells me that she does not remember the symptoms that she had.  After speaking with her  on the telephone he states that she had been outside reading a book and when he went to check on her she was in a deep sleep.  When he awoke her she had difficulty with her balance and garbled speech.  He states that she had difficulty repeating phrases after him but was able to get her words out.  He did not notice any unilateral weakness or facial droop.  He reports that her speech had improved prior to going to bed however not completely resolved although when she awoke the next morning her speech was completely at its baseline.    Review of Systems   Constitutional: Positive for fatigue. Negative for activity change, chills and fever.   HENT: Positive for congestion, rhinorrhea and sneezing. Negative for trouble swallowing.    Eyes: Negative.    Respiratory: Positive for cough. Negative for chest tightness and shortness of breath.    Cardiovascular: Negative.  Negative for palpitations.   Gastrointestinal: Negative for blood in stool, diarrhea, nausea and vomiting.   Genitourinary: Negative.  Negative for hematuria.   Musculoskeletal: Negative.  Negative for gait problem.   Skin: Negative.    Neurological: Negative.  Negative for dizziness, weakness and numbness.   Hematological: Negative.    Psychiatric/Behavioral: Negative.             Objective    Objective      Temp:  [98 °F (36.7 °C)-98.4 °F (36.9 °C)] 98 °F (36.7 °C)  Heart Rate:  [63-80] 65  Resp:  [18] 18  BP: (137-173)/(75-99) 152/82        Neurological Exam  Mental Status  Awake and alert. Oriented to person, place, time and situation. Oriented to person, place, and time. Speech is normal. Language is fluent with  no aphasia. Attention and concentration are normal. Fund of knowledge is appropriate for level of education.    Cranial Nerves  CN II: Visual fields full to confrontation.  CN III, IV, VI: Extraocular movements intact bilaterally. Pupils equal round and reactive to light bilaterally.  CN V: Facial sensation is normal.  CN VII: Full and symmetric facial movement.  CN VIII: Hearing appears to be intact bilaterally.  CN IX, X: Palate elevates symmetrically  CN XI: Shoulder shrug strength is normal.  CN XII: Tongue midline without atrophy or fasciculations.    Motor  Normal muscle bulk throughout. No fasciculations present. Normal muscle tone. Strength is 5/5 throughout all four extremities.    Sensory  Light touch is normal in upper and lower extremities.     Coordination    Finger-to-nose, rapid alternating movements and heel-to-shin normal bilaterally without dysmetria.    Gait    Steady.      Physical Exam  Vitals and nursing note reviewed.   Constitutional:       General: She is awake. She is not in acute distress.     Appearance: Normal appearance. She is not ill-appearing.   HENT:      Head: Normocephalic and atraumatic.      Mouth/Throat:      Mouth: Mucous membranes are moist.   Eyes:      Extraocular Movements: Extraocular movements intact.      Pupils: Pupils are equal, round, and reactive to light.   Cardiovascular:      Rate and Rhythm: Normal rate and regular rhythm.   Pulmonary:      Effort: Pulmonary effort is normal. No respiratory distress.      Comments: On room air.  Occasional nonproductive cough  Musculoskeletal:      Cervical back: Normal range of motion and neck supple.      Right lower leg: No edema.      Left lower leg: No edema.   Skin:     General: Skin is warm and dry.   Neurological:      General: No focal deficit present.      Mental Status: She is alert and oriented to person, place, and time.      Coordination: Coordination is intact.      Deep Tendon Reflexes: Strength normal.    Psychiatric:         Mood and Affect: Mood normal.         Speech: Speech normal.         Behavior: Behavior normal.         Results Review:    I reviewed the patient's new clinical results.    MRI brain without contrast is negative for acute stroke.    CTA head/neck are negative for flow-limiting stenosis/occulsion.    A1c 5.5  LDL 88  WBC 3.31  H/H13.3/38.8  Platelets 171  Creatinine 0.94, BUN 11  Sodium 141  AST 20  ALT 11  Urinalysis negative for infection    Results for orders placed during the hospital encounter of 08/11/22    Adult Transthoracic Echo Complete W/ Cont if Necessary Per Protocol (With Agitated Saline)    Interpretation Summary  · Left ventricular ejection fraction appears to be 56 - 60%.  · Left ventricular diastolic function is consistent with (grade I) impaired relaxation.  · No significant valvular disease  Left atrial cavity normal size          Assessment/Plan     Assessment/Plan:    This is an 87 year old female with known medical history of essential hypertension and cervicogenic headache who experienced transient dysarthria on 8/9/2020 between 2030 and 2200.  The patient reports that her symptoms had resolved prior to going to bed however due to to concern of possible TIA/CVA she presented to the emergency department yesterday morning for further evaluation.  She was not a candidate for IV thrombolytic therapy as she is currently at her neurologic baseline and was not a candidate for endovascular intervention secondary to no evidence of LVO or flow limiting stenosis visualized on CTA/CTP.   Of note patient was noted to be COVID-positive on admission.  She was admitted to the hospital service for further TIA work-up.     Antiplatelet PTA: ASA 81 mg  Anticoagulant PTA: None          1. Transient dysarthria  -stroke work-up is complete, MRI of the brain without contrast is negative for acute stroke however cannot completely rule out TIA given patient's diagnosis of COVID and description  of symptoms from .  -Continue  mg daily  -Continue lipitor 40 mg, LDL 88, goal <70  -TTE is negative for cardioembolic source  -PT/OT has evaluated patient and signed off, no DC needs identified  -Follow-up in stroke clinic in 6 weeks    2. COVID-19  -Patient reports that she tested positive for COVID-19 on 8/10/2022  -Management per hospitalist  -Currently asymptomatic, on room air  -Patient has decided not to take monoclonal antibody treatment    3. Essential hypertension  -OK to normalize patients blood pressure, goal <130/80  -OK to resume home BP medications  -Management per hospitalist    Plan of care was discussed with the patient and her spouse (via telephone) as well as primary team.  From a neurological standpoint the patient can be discharged home when cleared from primary team.    Discussed the importance of medication compliance ( mg and atorvastatin 40 mg) and lifestyle modifications (adequate blood pressure control, adequate control of hyperlipidemia, adequate glycemic control, increase physical activity as tolerated, and healthy diet) to help reduce the risk of future cerebrovascular events.  Also discussed the signs symptoms that would warrant the patient return back to the emergency department including unilateral weakness, unilateral numbness, visual disturbances, loss of balance, speech difficulties, and/or a sudden severe headache.  Patient voices her understanding.  She is been encouraged to contact our office should she have any questions or concerns prior to her follow-up appointment.    FABIANA Isaac  08/12/22  08:48 EDT

## 2022-08-15 ENCOUNTER — TRANSITIONAL CARE MANAGEMENT TELEPHONE ENCOUNTER (OUTPATIENT)
Dept: CALL CENTER | Facility: HOSPITAL | Age: 87
End: 2022-08-15

## 2022-08-15 NOTE — OUTREACH NOTE
Call Center TCM Note    Flowsheet Row Responses   [facility] patient discharged from? [place]   Does the patient have one of the following disease processes/diagnoses(primary or secondary)? Stroke (TIA)   TCM attempt successful? Yes   Call start time 0918   Call end time 0921   Discharge diagnosis Covid and TIA   Person spoke with today (if not patient) and relationship Patient   Meds reviewed with patient/caregiver? Yes   Is the patient having any side effects they believe may be caused by any medication additions or changes? No   Does the patient have all medications ordered at discharge? Yes   Is the patient taking all medications as directed (includes completed medication regime)? Yes   Medication comments Patient asked if she will need to continue to take the 325mg of asa. Did advise with a TIA its important to take the blood thinner daily to prevent another stroke in the future.   Comments regarding appointments Has PCP follow up 08/16 @ 124   Does the patient have a primary care provider?  Yes   Does the patient have an appointment with their PCP within 7 days of discharge? Yes   Has the patient kept scheduled appointments due by today? N/A   Has home health visited the patient within 72 hours of discharge? N/A   Did the patient receive a copy of their discharge instructions? Yes   Nursing interventions Reviewed instructions with patient   What is the patient's perception of their health status since discharge? Improving   Nursing interventions Nurse provided patient education  [Provided education on stroke and stroke s/s and the improtance of moving around with COVID to prevent complications such as pneumonia.]   Is the patient able to teach back FAST for Stroke? Yes   Is the patient/caregiver able to teach back the risk factors for a stroke? High blood pressure-goal below 120/80, Carotid or other artery disease, History of TIAs   Is the patient/caregiver able to teach back signs and symptoms  related to disease process for when to call PCP? Yes   Is the patient/caregiver able to teach back signs and symptoms related to disease process for when to call 911? Yes   Is the patient/caregiver able to teach back the hierarchy of who to call/visit for symptoms/problems? PCP, Specialist, Home health nurse, Urgent Care, ED, 911 Yes   TCM call completed? Yes          Karlie Hagan RN    8/15/2022, 09:24 EDT

## 2022-08-16 ENCOUNTER — OFFICE VISIT (OUTPATIENT)
Dept: INTERNAL MEDICINE | Facility: CLINIC | Age: 87
End: 2022-08-16

## 2022-08-16 VITALS
BODY MASS INDEX: 18.93 KG/M2 | DIASTOLIC BLOOD PRESSURE: 80 MMHG | TEMPERATURE: 98.6 F | SYSTOLIC BLOOD PRESSURE: 132 MMHG | HEIGHT: 66 IN | HEART RATE: 64 BPM | WEIGHT: 117.8 LBS

## 2022-08-16 DIAGNOSIS — G45.9 TIA (TRANSIENT ISCHEMIC ATTACK): Primary | ICD-10-CM

## 2022-08-16 DIAGNOSIS — U07.1 COVID-19 VIRUS INFECTION: ICD-10-CM

## 2022-08-16 PROCEDURE — 1111F DSCHRG MED/CURRENT MED MERGE: CPT | Performed by: STUDENT IN AN ORGANIZED HEALTH CARE EDUCATION/TRAINING PROGRAM

## 2022-08-16 PROCEDURE — 99495 TRANSJ CARE MGMT MOD F2F 14D: CPT | Performed by: STUDENT IN AN ORGANIZED HEALTH CARE EDUCATION/TRAINING PROGRAM

## 2022-08-16 NOTE — PROGRESS NOTES
Transitional Care Follow Up Visit      Patient has a past medical history of hypertension, hyperlipidemia, allergic rhinitis, weight loss, GERD, hemorrhoids, osteoporosis, osteoarthritis, TIA (8/2022), carpal tunnel syndrome, insomnia, numbness and tingling of both feet and COVID-19.      Subjective     Kamille Miles is a 87 y.o. female who presents for a transitional care management visit.    Within 48 business hours after discharge our office contacted her via telephone to coordinate her care and needs.      I reviewed and discussed the details of that call along with the discharge summary, hospital problems, inpatient lab results, inpatient diagnostic studies, and consultation reports with Kamille.     Current outpatient and discharge medications have been reconciled for the patient.  Reviewed by: Fredis Pérez MD      Date of TCM Phone Call 8/12/2022   [facility]   Date of Admission 8/11/2022   Date of Discharge 8/12/2022   Discharge Disposition Home or Self Care     Risk for Readmission (LACE) Score: 2 (8/12/2022  6:01 AM)      History of Present Illness   Course During Hospital Stay: Patient was admitted to [facility] in setting of recently diagnosed COVID and started Paxlovid on 8/10/2022.  Discharge summary is not completed, partial information reviewed.  She had been experiencing congestion and cough without any concerning symptoms.  On day of admission 8/11/22 she was noted with acute onset dysarthria, fall and partial amnesia related to current events.  This was witnessed by her , but no focal weakness was noted.  She was evaluated by stroke team and they concluded this was consistent with TIA.  MRI of the brain showed age-related changes, no evidence of acute infarct, hemorrhage or mass.  CT and CTA of head and neck did not reveal any acute vascular abnormalities.  They recommended to increase her home aspirin from 81 mg to 325 mg and continue on simvastatin 10 mg.  Plan  "follow-up with neurology 6 weeks later.    Patient is overall doing well since she was discharged.  Her COVID symptoms have pretty much resolved, she only has mild cough lingering.  She does have follow-up appointment with neurology stroke at the end of September.  She also is planning to go to [place] with her  in the middle of September, he will try to get an earlier appointment with neurology if possible.  She continues to take increased dose of aspirin.     The following portions of the patient's history were reviewed and updated as appropriate: allergies, current medications, past family history, past medical history, past social history, past surgical history and problem list.    Review of Systems    Objective      Vitals:    08/16/22 1240   BP: 132/80   BP Location: Left arm   Patient Position: Sitting   Cuff Size: Adult   Pulse: 64   Temp: 98.6 °F (37 °C)   TempSrc: Temporal   Weight: 53.4 kg (117 lb 12.8 oz)   Height: 167.6 cm (65.98\")   PainSc: 0-No pain       Physical Exam  Vitals reviewed.   Constitutional:       Appearance: Normal appearance.   HENT:      Head: Normocephalic and atraumatic.      Nose: No congestion.   Eyes:      Extraocular Movements: Extraocular movements intact.      Conjunctiva/sclera: Conjunctivae normal.   Cardiovascular:      Rate and Rhythm: Normal rate and regular rhythm.      Heart sounds: Normal heart sounds. No murmur heard.  Pulmonary:      Effort: Pulmonary effort is normal.      Breath sounds: Normal breath sounds.   Musculoskeletal:      Cervical back: Neck supple.      Right lower leg: No edema.      Left lower leg: No edema.   Skin:     General: Skin is warm and dry.   Neurological:      Mental Status: She is alert and oriented to person, place, and time. Mental status is at baseline.      Motor: No weakness.      Gait: Gait normal.   Psychiatric:         Behavior: Behavior normal.         Thought Content: Thought content normal.         Assessment & Plan "       1. TIA (transient ischemic attack)  Stable and doing well since discharge.  Imaging does not show any significant vessel disease.  TTE performed in the hospital.  Continue on aspirin 325 mg and simvastatin 10 mg.  Continue follow-up with neurology stroke.    2. COVID-19 virus infection  Symptoms almost resolved.    Return as needed.         Future Appointments       Provider Department Center    9/28/2022 9:00 AM [facility] NEURO STROKE [facility]LL[facility] NEUROLOGY BELLO Pérez MD

## 2022-08-17 ENCOUNTER — TELEPHONE (OUTPATIENT)
Dept: NEUROLOGY | Facility: CLINIC | Age: 87
End: 2022-08-17

## 2022-08-17 NOTE — TELEPHONE ENCOUNTER
Caller: Kamille Miles    Relationship: Self    Best call back number: [phone]    What was the call regarding: PT CALLED NEEDING TO RESCHEDULE HER HOSPITAL F/U APPT ON 9/28/22 TO A SOONER DATE AS SHE IS LEAVING [facility] AND GOING TO [place] FOR SEVERAL MONTHS AROUND September 15/16TH. HUB UNABLE TO WORK PT IN FOR SOONER APPT THAT WOULD MEET THAT TIMEFRAME FOR F/U.    PLEASE ADVISE IF OFFICE WOULD LIKE TO WORK PT IN FOR SOONER APPT OR IF OKAY TO RESCHEDULE FOR SEVERAL MONTHS OUT WHEN PT RETURNS TO [place].    Do you require a callback: YES, PLEASE.    PLEASE REVIEW AND ADVISE.

## 2022-08-17 NOTE — TELEPHONE ENCOUNTER
Changed MsKd Ginger appointment to 9/6/22 at 11:00AM.  She verbalized understanding and had no further questions.

## 2022-08-31 DIAGNOSIS — F51.01 PRIMARY INSOMNIA: ICD-10-CM

## 2022-08-31 RX ORDER — ZOLPIDEM TARTRATE 10 MG/1
TABLET ORAL
Qty: 30 TABLET | Refills: 2 | Status: CANCELLED | OUTPATIENT
Start: 2022-08-31

## 2022-08-31 NOTE — TELEPHONE ENCOUNTER
Please advise patient it is better to stay off of it.  Take melatonin instead.  Practice good sleep habits.    sleep hygiene including going to bed at the same time and getting up at the same time every day, going to bed early enough to get 7 or 8 hours in bed, reading and relaxing before bedtime, and avoiding the TV, computer, phone, iPad close to bedtime.

## 2022-08-31 NOTE — TELEPHONE ENCOUNTER
Rx Refill Note  Requested Prescriptions     Pending Prescriptions Disp Refills   • zolpidem (AMBIEN) 10 MG tablet 30 tablet 2     Sig: Take 1/2 to 1 tablet every evening as needed     Last refill:   8/24/21 #30/2  Patient comment: I never renewed this after my appt Jose.  Last office visit with prescribing clinician: 6/16/2022      Next office visit with prescribing clinician: Visit date not found            Kristin Gatica RN  08/31/22, 13:14 EDT

## 2022-09-03 NOTE — PROGRESS NOTES
"  New Patient Office Visit      Encounter Date: 2022   Patient Name: Kamille Miles  : 1935   MRN: [id number]   PCP: Brisa Lord MD    Referring Provider: No ref. provider found     Chief Complaint:    Chief Complaint   Patient presents with   • Follow-up   • Transient Ischemic Attack       History of Present Illness: Kamille Miles is a 87 y.o. female with known medical diagnoses of hypertension and cervicogenic headache who presented to [facility] ED on 2022 with concerns of transient nonsensical speech and gait imbalance.  Patient was found to be COVID-positive on this admission.  MRI at at the time was negative for acute infarct patient was diagnosed with a TIA and discharged on Aspirin 325mg daily. She presents to clinic today for hospital follow up and to establish care.     Patient states that since her hospitalization she feels that she is back to her baseline. She continues to take her medications as prescribed and without issue. She denies any new onset of stroke-like symptoms or neurological deficits. She states that her and her  are \"snowbirds\" and will be leaving for [place] in 3 weeks and will not return until May.     Stroke Risk Factors: Hypertension, TIA      Subjective      Review of Systems:   Review of Systems   All other systems reviewed and are negative.      Past Medical History:   Past Medical History:   Diagnosis Date   • Headache     cervicogenic -work-up by neurologist in [place], [place]   • Hypertension    • Stroke (HCC) 8/10/22   • TIA (transient ischemic attack) 2022    Admission [facility] 2022. Increase ASA to 325 mg. Cont simvastatin 10 mg       Past Surgical History:   Past Surgical History:   Procedure Laterality Date   • CARPAL TUNNEL RELEASE  ?       Family History:   Family History   Problem Relation Age of Onset   • Arrhythmia Mother         Pacemaker placed   • Stroke Mother         Age 91   • Lymphoma Father    • Breast cancer Neg Hx    • " Ovarian cancer Neg Hx        Social History:   Social History     Socioeconomic History   • Marital status:    Tobacco Use   • Smoking status: Never Smoker   • Smokeless tobacco: Never Used   Vaping Use   • Vaping Use: Never used   Substance and Sexual Activity   • Alcohol use: Yes     Alcohol/week: 3.0 standard drinks     Types: 3 Glasses of wine per week     Comment: small glass of wine at dinner   • Drug use: Never   • Sexual activity: Not Currently     Partners: Male       Medications:     Current Outpatient Medications:   •  aspirin 325 MG tablet, Take 1 tablet by mouth Daily., Disp: 90 tablet, Rfl: 1  •  Calcium 500-100 MG-UNIT chewable tablet, Chew 1 tablet Every 4 (Four) Hours As Needed., Disp: , Rfl:   •  cholecalciferol (Vitamin D, Cholecalciferol,) 25 MCG (1000 UT) tablet, Take 1 tablet by mouth Daily., Disp: 60 tablet, Rfl: 5  •  cyanocobalamin (VITAMIN B-12) 500 MCG tablet, Take 1 tablet by mouth Daily., Disp: 90 tablet, Rfl: 1  •  famotidine (PEPCID) 20 MG tablet, Take 1 tablet by mouth Every Night., Disp: 90 tablet, Rfl: 3  •  fluticasone (FLONASE) 50 MCG/ACT nasal spray, 1 spray into the nostril(s) as directed by provider 2 (Two) Times a Day. (Patient taking differently: 1 spray into the nostril(s) as directed by provider 2 (Two) Times a Day. PRN), Disp: 16 g, Rfl: 5  •  hydrocortisone 1 % cream, Apply  topically to the appropriate area as directed 2 (Two) Times a Day As Needed for Rash (itching)., Disp: 1 each, Rfl: 0  •  Hydrocortisone, Perianal, (ANUSOL-HC) 2.5 % rectal cream, Insert  into the rectum 2 (Two) Times a Day As Needed for Hemorrhoids., Disp: 28 g, Rfl: 0  •  ketoconazole (NIZORAL) 2 % cream, Apply 1 application topically to the appropriate area as directed Daily., Disp: 60 g, Rfl: 1  •  lisinopril (PRINIVIL,ZESTRIL) 5 MG tablet, Take 1 tablet by mouth Daily., Disp: 90 tablet, Rfl: 1  •  Probiotic Product (TRUBIOTICS PO), Take 1 capsule by mouth Daily., Disp: , Rfl:   •   simvastatin (ZOCOR) 10 MG tablet, Take 1 tablet by mouth Every Evening., Disp: 90 tablet, Rfl: 1  •  zolpidem (AMBIEN) 10 MG tablet, Take 1/2 to 1 tablet every evening as needed, Disp: 30 tablet, Rfl: 2    Allergies:   No Known Allergies    Objective     Physical Exam:  Vital Signs:   Vitals:    09/06/22 1119   BP: 124/66   Pulse: 72   Temp: 98.4 °F (36.9 °C)   SpO2: 99%   Weight: 55.8 kg (123 lb)     Body mass index is 19.86 kg/m².     Physical Exam  Vitals reviewed.   Constitutional:       General: She is not in acute distress.     Appearance: She is not ill-appearing.   HENT:      Head: Normocephalic and atraumatic.      Mouth/Throat:      Mouth: Mucous membranes are moist.      Pharynx: Oropharynx is clear.   Eyes:      Extraocular Movements: Extraocular movements intact.      Pupils: Pupils are equal, round, and reactive to light.   Cardiovascular:      Rate and Rhythm: Normal rate and regular rhythm.      Pulses: Normal pulses.      Heart sounds: No murmur heard.  Pulmonary:      Effort: Pulmonary effort is normal. No respiratory distress.   Abdominal:      Palpations: Abdomen is soft.   Musculoskeletal:      Right lower leg: No edema.      Left lower leg: No edema.   Skin:     General: Skin is warm and dry.   Neurological:      General: No focal deficit present.      Mental Status: She is oriented to person, place, and time. Mental status is at baseline.      Cranial Nerves: No cranial nerve deficit.      Sensory: No sensory deficit.      Motor: No weakness.      Coordination: Coordination is intact. Coordination normal.      Gait: Gait normal.      Deep Tendon Reflexes: Strength normal.   Psychiatric:         Mood and Affect: Mood normal.         Speech: Speech normal.         Behavior: Behavior normal.         Thought Content: Thought content normal.         Judgment: Judgment normal.       Neurological Exam  Mental Status  Awake, alert and oriented to person, place and time. Oriented to person, place, time  and situation. Oriented to person, place, and time. Speech is normal. Language is fluent with no aphasia. Fund of knowledge is appropriate for level of education.    Cranial Nerves  CN II: Right normal visual field. Left normal visual field.  CN III, IV, VI: Extraocular movements intact bilaterally. Pupils equal round and reactive to light bilaterally.  CN V:  Right: Facial sensation is normal.  Left: Facial sensation is normal on the left.  CN VII: Full and symmetric facial movement.  CN IX, X: Palate elevates symmetrically  CN XII: Tongue midline without atrophy or fasciculations.    Motor   Normal muscle tone. Strength is 5/5 throughout all four extremities.    Sensory  Light touch is normal in upper and lower extremities.     Coordination    Finger-to-nose, rapid alternating movements and heel-to-shin normal bilaterally without dysmetria.    Gait   Normal gait.Casual gait is normal including stance, stride, and arm swing.       NIH Stroke Scale    1a  Level of consciousness: 0=alert; keenly responsive   1b. LOC questions:  0=Answers both questions correctly    1c. LOC commands: 0=Performs both tasks correctly   2.  Best Gaze: 0=normal   3. Visual: 0=No visual loss   4. Facial Palsy: 0=Normal symmetric movement   5a. Motor left arm: 0=No drift, limb holds 90 (or 45) degrees for full 10 seconds   5b.  Motor right arm: 0=No drift, limb holds 90 (or 45) degrees for full 10 seconds   6a. Motor left le=No drift, limb holds 90 (or 45) degrees for full 10 seconds   6b  Motor right le=No drift, limb holds 90 (or 45) degrees for full 10 seconds   7. Limb Ataxia: 0=Absent   8.  Sensory: 0=Normal; no sensory loss   9. Best Language:  0=No aphasia, normal   10. Dysarthria: 0=Normal   11. Extinction and Inattention: 0=No abnormality    Total:   0         Modified Sharon Score: 0        0  No Symptoms    1 No significant disability. Able to carry out all usual activities, despite some symptoms.    2 Slight disability.  Able to look after own affairs without assistance, but unable to carry out all previous activities.    3 Moderate disability. Requires some help, but able to walk unassisted.    4 Moderately severe disability. Unable to attend to own bodily needs without assistance, and unable to walk unassisted.    5 Severe disability. Requires constant nursing care and attention, bedridden, incontinent.    6 Dead        PHQ-9 Depression Screening  Little interest or pleasure in doing things? 0-->not at all   Feeling down, depressed, or hopeless? 0-->not at all   Trouble falling or staying asleep, or sleeping too much?     Feeling tired or having little energy?     Poor appetite or overeating?     Feeling bad about yourself - or that you are a failure or have let yourself or your family down?     Trouble concentrating on things, such as reading the newspaper or watching television?     Moving or speaking so slowly that other people could have noticed? Or the opposite - being so fidgety or restless that you have been moving around a lot more than usual?     Thoughts that you would be better off dead, or of hurting yourself in some way?     PHQ-9 Total Score 0   If you checked off any problems, how difficult have these problems made it for you to do your work, take care of things at home, or get along with other people?             Imaging Reviewed:   CT head without contrast  IMPRESSION:     1. No acute intracranial abnormality.  2. Mild chronic small vessel ischemic change.    CTA head and neck  IMPRESSION:     No significant vascular abnormality. No significant stenosis, aneurysm  or dissection.    CT perfusion  IMPRESSION:  No significant perfusion abnormalities in the brain.    MRI brain without contrast  IMPRESSION:  Age-related changes are present, otherwise without evidence of acute  infarct, hemorrhage, mass or mass effect.    TTE  ·  Left ventricular ejection fraction appears to be 56 - 60%.  · Left ventricular diastolic  function is consistent with (grade I) impaired relaxation.  · No significant valvular disease    Laboratory Results:     triglycerides 65 HDL 78 LDL 88  Hemoglobin A1c 5.5  Hemoglobin 13.3/hematocrit 38.8  Platelets 171  ALT 11/AST 20    Assessment / Plan      Assessment/Plan:   Diagnoses and all orders for this visit:    1. History of TIA (transient ischemic attack) (Primary)       - Continue Aspirin 325mg daily       - Patient will be living in [place] from October-May, she states that she will follow up when she returns, she does state that if she feels she needs to be seen sooner she will schedule a telehealth visit for this    2. Essential (primary) hypertension       - BP in clinic today 124/66, goal <130/80       - Patient states BP is well controlled at home       - Continue home antihypertensive as prescribed       - Continued management per PCP    Discussed the importance of medication compliance and lifestyle modifications (adequate blood pressure control, adequate control of hyperlipidemia, adequate glycemic control, increase physical activity, and healthy diet) to help reduce the risk of future cerebrovascular events.  Also discussed the signs symptoms that would warrant the patient return back to the emergency department including unilateral weakness, unilateral numbness, visual disturbances, loss of balance, speech difficulties, and/or a sudden severe headache.  Patient agrees and verbalized understanding.       Follow Up:   Return in about 8 months (around 5/6/2023).      FABIANA Malave  [facility]

## 2022-09-06 ENCOUNTER — OFFICE VISIT (OUTPATIENT)
Dept: NEUROLOGY | Facility: CLINIC | Age: 87
End: 2022-09-06

## 2022-09-06 VITALS
OXYGEN SATURATION: 99 % | SYSTOLIC BLOOD PRESSURE: 124 MMHG | BODY MASS INDEX: 19.86 KG/M2 | WEIGHT: 123 LBS | HEART RATE: 72 BPM | TEMPERATURE: 98.4 F | DIASTOLIC BLOOD PRESSURE: 66 MMHG

## 2022-09-06 DIAGNOSIS — Z86.73 HISTORY OF TIA (TRANSIENT ISCHEMIC ATTACK): Primary | ICD-10-CM

## 2022-09-06 DIAGNOSIS — I10 ESSENTIAL (PRIMARY) HYPERTENSION: ICD-10-CM

## 2022-09-06 PROCEDURE — 99214 OFFICE O/P EST MOD 30 MIN: CPT

## 2022-10-21 DIAGNOSIS — E78.2 MIXED HYPERLIPIDEMIA: ICD-10-CM

## 2022-10-24 RX ORDER — SIMVASTATIN 10 MG
10 TABLET ORAL EVERY EVENING
Qty: 90 TABLET | Refills: 1 | Status: SHIPPED | OUTPATIENT
Start: 2022-10-24

## 2023-02-17 DIAGNOSIS — F51.01 PRIMARY INSOMNIA: ICD-10-CM

## 2023-02-17 RX ORDER — ZOLPIDEM TARTRATE 10 MG/1
TABLET ORAL
Qty: 30 TABLET | Refills: 0 | Status: SHIPPED | OUTPATIENT
Start: 2023-02-17

## 2023-04-11 RX ORDER — TOLTERODINE 4 MG/1
4 CAPSULE, EXTENDED RELEASE ORAL DAILY
Qty: 30 CAPSULE | Refills: 5 | Status: SHIPPED | OUTPATIENT
Start: 2023-04-11

## 2023-04-15 DIAGNOSIS — I10 BENIGN ESSENTIAL HYPERTENSION: Chronic | ICD-10-CM

## 2023-04-17 RX ORDER — LISINOPRIL 5 MG/1
5 TABLET ORAL DAILY
Qty: 90 TABLET | Refills: 1 | Status: SHIPPED | OUTPATIENT
Start: 2023-04-17

## 2023-05-05 ENCOUNTER — CLINICAL SUPPORT (OUTPATIENT)
Dept: INTERNAL MEDICINE | Facility: CLINIC | Age: 88
End: 2023-05-05
Payer: MEDICARE

## 2023-05-05 DIAGNOSIS — R30.0 DYSURIA: Primary | ICD-10-CM

## 2023-05-05 LAB
BILIRUB BLD-MCNC: NEGATIVE MG/DL
CLARITY, POC: CLEAR
COLOR UR: YELLOW
EXPIRATION DATE: NORMAL
GLUCOSE UR STRIP-MCNC: NEGATIVE MG/DL
KETONES UR QL: NEGATIVE
LEUKOCYTE EST, POC: NEGATIVE
Lab: NORMAL
NITRITE UR-MCNC: NEGATIVE MG/ML
PH UR: 5.5 [PH] (ref 5–8)
PROT UR STRIP-MCNC: NEGATIVE MG/DL
RBC # UR STRIP: NEGATIVE /UL
SP GR UR: 1.01 (ref 1–1.03)
UROBILINOGEN UR QL: NORMAL

## 2023-05-05 PROCEDURE — 81003 URINALYSIS AUTO W/O SCOPE: CPT | Performed by: INTERNAL MEDICINE

## 2023-05-11 DIAGNOSIS — E78.2 MIXED HYPERLIPIDEMIA: ICD-10-CM

## 2023-05-12 RX ORDER — SIMVASTATIN 10 MG
10 TABLET ORAL EVERY EVENING
Qty: 90 TABLET | Refills: 1 | Status: SHIPPED | OUTPATIENT
Start: 2023-05-12

## 2023-05-12 NOTE — TELEPHONE ENCOUNTER
Rx Refill Note  Requested Prescriptions     Pending Prescriptions Disp Refills   • simvastatin (ZOCOR) 10 MG tablet [Pharmacy Med Name: SIMVASTATIN 10MG TABLETS] 90 tablet 1     Sig: TAKE 1 TABLET BY MOUTH EVERY EVENING      Last office visit with prescribing clinician: 08/16/22  Next office visit with prescribing clinician: 6/19/2023                         Would you like a call back once the refill request has been completed: [] Yes [] No    If the office needs to give you a call back, can they leave a voicemail: [] Yes [] No    Blanquita Loja LPN  05/12/23, 09:46 EDT

## 2023-06-01 PROBLEM — Z86.73 HISTORY OF TIA (TRANSIENT ISCHEMIC ATTACK): Status: ACTIVE | Noted: 2022-08-11

## 2023-06-01 NOTE — PROGRESS NOTES
"    Follow Up Office Visit      Encounter Date: 2023   Patient Name: Kamille Miles  : 1935   MRN: [id number]   PCP: Brisa Lord MD    Chief Complaint:    Chief Complaint   Patient presents with   • Follow-up       History of Present Illness: Kamille Miles is a 87 y.o. female, life-long non-smoker, with known medical diagnoses of essential hypertension and hyperlipidemia who presents to stroke clinic today for 10 month follow-up s/p TIA.    Clinic visit 2022; per Yenny Dave APRN: Patient presented to [facility] ED on 2022 with concerns of transient nonsensical speech and gait imbalance.  Patient was found to be COVID-positive on this admission.  MRI at at the time was negative for acute infarct patient was diagnosed with a TIA and discharged on Aspirin 325mg daily. She presents to clinic today for hospital follow up and to establish care. Patient states that since her hospitalization she feels that she is back to her baseline. She continues to take her medications as prescribed and without issue. She denies any new onset of stroke-like symptoms or neurological deficits. She states that her and her  are \"snowbirds\" and will be leaving for [place] in 3 weeks and will not return until May.     Clinic visit 2023: Since her last follow-up the patient denies any new or recurrent strokelike symptoms.  She tells me she has remained compliant with her  mg and simvastatin 10 mg daily.  She does note increased bruising with a full-strength aspirin and would like to decrease this to a baby aspirin if at all possible.  She tells me that she is independent of all of her ADLs and is able to ambulate without an assistive device.  She denies any recent falls.    Subjective        I have reviewed and the following portions of the patient's history were updated as appropriate: past family history, past medical history, past social history, past surgical history and problem " list.    Medications:     Current Outpatient Medications:   •  Calcium 500-100 MG-UNIT chewable tablet, Chew 1 tablet Every 4 (Four) Hours As Needed., Disp: , Rfl:   •  cholecalciferol (Vitamin D, Cholecalciferol,) 25 MCG (1000 UT) tablet, Take 1 tablet by mouth Daily., Disp: 60 tablet, Rfl: 5  •  cyanocobalamin (VITAMIN B-12) 500 MCG tablet, Take 1 tablet by mouth Daily., Disp: 90 tablet, Rfl: 1  •  famotidine (PEPCID) 20 MG tablet, Take 1 tablet by mouth Every Night., Disp: 90 tablet, Rfl: 3  •  fluticasone (FLONASE) 50 MCG/ACT nasal spray, 1 spray into the nostril(s) as directed by provider 2 (Two) Times a Day. (Patient taking differently: 1 spray into the nostril(s) as directed by provider 2 (Two) Times a Day. PRN), Disp: 16 g, Rfl: 5  •  hydrocortisone 1 % cream, Apply  topically to the appropriate area as directed 2 (Two) Times a Day As Needed for Rash (itching)., Disp: 1 each, Rfl: 0  •  Hydrocortisone, Perianal, (ANUSOL-HC) 2.5 % rectal cream, Insert  into the rectum 2 (Two) Times a Day As Needed for Hemorrhoids., Disp: 28 g, Rfl: 0  •  lisinopril (PRINIVIL,ZESTRIL) 5 MG tablet, TAKE 1 TABLET BY MOUTH DAILY, Disp: 90 tablet, Rfl: 1  •  Probiotic Product (TRUBIOTICS PO), Take 1 capsule by mouth Daily., Disp: , Rfl:   •  simvastatin (ZOCOR) 10 MG tablet, TAKE 1 TABLET BY MOUTH EVERY EVENING, Disp: 90 tablet, Rfl: 1    Current Facility-Administered Medications:   •  aspirin chewable tablet 81 mg, 81 mg, Oral, Once, Sis Wasserman, FABIANA    Allergies:   No Known Allergies    Review of Systems   Constitutional: Negative.    HENT: Positive for hearing loss. Negative for nosebleeds.    Eyes: Negative.  Negative for visual disturbance.   Respiratory: Negative.  Negative for chest tightness and shortness of breath.    Cardiovascular: Negative.  Negative for chest pain and palpitations.   Gastrointestinal: Negative.  Negative for blood in stool.   Genitourinary: Negative.  Negative for hematuria.  "  Musculoskeletal: Negative.  Negative for gait problem.   Skin: Negative.    Neurological: Negative.    Hematological: Bruises/bleeds easily.   Psychiatric/Behavioral: Negative.         Objective     Physical Exam:   Vital Signs:   Vitals:    06/02/23 1007   BP: 122/76   Pulse: 78   Temp: 98.5 °F (36.9 °C)   SpO2: 95%   Weight: 55.8 kg (123 lb)   Height: 167.6 cm (65.98\")     Body mass index is 19.86 kg/m².    Physical Exam  Vitals and nursing note reviewed.   Constitutional:       General: She is not in acute distress.     Appearance: Normal appearance. She is not ill-appearing.   HENT:      Head: Normocephalic and atraumatic.      Mouth/Throat:      Mouth: Mucous membranes are moist.   Eyes:      Extraocular Movements: Extraocular movements intact.      Pupils: Pupils are equal, round, and reactive to light.   Cardiovascular:      Rate and Rhythm: Normal rate and regular rhythm.      Heart sounds: Normal heart sounds.   Pulmonary:      Effort: Pulmonary effort is normal. No respiratory distress.      Breath sounds: Normal breath sounds.      Comments: On room air  Abdominal:      General: Bowel sounds are normal.   Musculoskeletal:      Right lower leg: No edema.      Left lower leg: No edema.   Skin:     General: Skin is warm and dry.      Findings: Bruising present.   Neurological:      General: No focal deficit present.      Mental Status: She is alert and oriented to person, place, and time.      Motor: Motor strength is normal.      Coordination: Coordination is intact.   Psychiatric:         Mood and Affect: Mood normal.         Speech: Speech normal.         Behavior: Behavior normal.       Neurological Exam  Mental Status  Alert. Oriented to person, place, time and situation. Oriented to person, place, and time. Speech is normal. Language is fluent with no aphasia. Attention and concentration are normal. Fund of knowledge is appropriate for level of education.    Cranial Nerves  CN II: Visual fields full " to confrontation.  CN III, IV, VI: Extraocular movements intact bilaterally. Pupils equal round and reactive to light bilaterally.  CN V: Facial sensation is normal.  CN VII: Full and symmetric facial movement.  CN VIII: Hearing intact bilaterally.  CN IX, X: Palate elevates symmetrically  CN XII: Tongue midline without atrophy or fasciculations.    Motor  Normal muscle bulk throughout. No fasciculations present. Normal muscle tone. Strength is 5/5 throughout all four extremities.    Sensory  Sensation is intact to light touch, pinprick, vibration and proprioception in all four extremities.    Coordination    Finger-to-nose, rapid alternating movements and heel-to-shin normal bilaterally without dysmetria.    Gait  Casual gait is normal including stance, stride, and arm swing.       NIH Stroke Scale    1a  Level of consciousness: 0=alert; keenly responsive   1b. LOC questions:  0=Answers both questions correctly    1c. LOC commands: 0=Performs both tasks correctly   2.  Best Gaze: 0=normal   3. Visual: 0=No visual loss   4. Facial Palsy: 0=Normal symmetric movement   5a. Motor left arm: 0=No drift, limb holds 90 (or 45) degrees for full 10 seconds   5b.  Motor right arm: 0=No drift, limb holds 90 (or 45) degrees for full 10 seconds   6a. Motor left le=No drift, limb holds 90 (or 45) degrees for full 10 seconds   6b  Motor right le=No drift, limb holds 90 (or 45) degrees for full 10 seconds   7. Limb Ataxia: 0=Absent   8.  Sensory: 0=Normal; no sensory loss   9. Best Language:  0=No aphasia, normal   10. Dysarthria: 0=Normal   11. Extinction and Inattention: 0=No abnormality    Total:   0         Modified Enoc Scale: 0          0  No Symptoms    1 No significant disability. Able to carry out all usual activities, despite some symptoms.    2 Slight disability. Able to look after own affairs without assistance, but unable to carry out all previous activities.    3 Moderate disability. Requires some help, but  able to walk unassisted.    4 Moderately severe disability. Unable to attend to own bodily needs without assistance, and unable to walk unassisted.    5 Severe disability. Requires constant nursing care and attention, bedridden, incontinent.    6 Dead      PHQ-9 Depression Screening  Little interest or pleasure in doing things? 0-->not at all   Feeling down, depressed, or hopeless? 0-->not at all   Trouble falling or staying asleep, or sleeping too much?     Feeling tired or having little energy?     Poor appetite or overeating?     Feeling bad about yourself - or that you are a failure or have let yourself or your family down?     Trouble concentrating on things, such as reading the newspaper or watching television?     Moving or speaking so slowly that other people could have noticed? Or the opposite - being so fidgety or restless that you have been moving around a lot more than usual?     Thoughts that you would be better off dead, or of hurting yourself in some way?     PHQ-9 Total Score 0   If you checked off any problems, how difficult have these problems made it for you to do your work, take care of things at home, or get along with other people?         Results Reviewed    No new results to review    Assessment / Plan      Assessment/Plan:   Diagnoses and all orders for this visit:    1. History of TIA (transient ischemic attack) (Primary), etiology likely secondary to hypercoagulable state during COVID-19 infection  -Okay to decrease aspirin to 81 mg  -Continue risk factor modification strategies   -Continue exercise regimen  -911 for strokelike symptoms  -Patient had yearly labs with primary care physician drawn this morning.  She has a yearly follow-up scheduled later this month.    2. Benign essential hypertension  -Patient tells me her blood pressures been adequately controlled, 120-130/60-70  -Continue antihypertensive medications per primary care recommendations    3. Mixed hyperlipidemia  -Last LDL  88, goal <70  -Continue simvastatin 10 mg daily       Discussed the importance of medication compliance (ASA 81 mg and simvastatin 10 mg) and lifestyle modifications (adequate blood pressure control, adequate control of hyperlipidemia, and continued physical activity regimen) to help reduce the risk of future cerebrovascular events.  Also discussed the signs symptoms that would warrant the patient return back to the emergency department including unilateral weakness, unilateral numbness, visual disturbances, loss of balance, speech difficulties, and/or a sudden severe headache.  Patient voices understanding.  She has been encouraged to contact our office should she have any questions or concerns in the future.    Follow Up:   Return if symptoms worsen or fail to improve.    Time spent 15 minutes    FABIANA Luz  [facility]

## 2023-06-02 ENCOUNTER — LAB (OUTPATIENT)
Dept: LAB | Facility: HOSPITAL | Age: 88
End: 2023-06-02
Payer: MEDICARE

## 2023-06-02 ENCOUNTER — OFFICE VISIT (OUTPATIENT)
Dept: NEUROLOGY | Facility: CLINIC | Age: 88
End: 2023-06-02

## 2023-06-02 VITALS
WEIGHT: 123 LBS | OXYGEN SATURATION: 95 % | DIASTOLIC BLOOD PRESSURE: 76 MMHG | BODY MASS INDEX: 19.77 KG/M2 | TEMPERATURE: 98.5 F | HEART RATE: 78 BPM | HEIGHT: 66 IN | SYSTOLIC BLOOD PRESSURE: 122 MMHG

## 2023-06-02 DIAGNOSIS — I10 BENIGN ESSENTIAL HYPERTENSION: Chronic | ICD-10-CM

## 2023-06-02 DIAGNOSIS — I10 BENIGN ESSENTIAL HYPERTENSION: ICD-10-CM

## 2023-06-02 DIAGNOSIS — M81.0 SENILE OSTEOPOROSIS: ICD-10-CM

## 2023-06-02 DIAGNOSIS — Z86.73 HISTORY OF TIA (TRANSIENT ISCHEMIC ATTACK): Primary | ICD-10-CM

## 2023-06-02 DIAGNOSIS — E78.2 MIXED HYPERLIPIDEMIA: ICD-10-CM

## 2023-06-02 LAB
25(OH)D3 SERPL-MCNC: 50.3 NG/ML (ref 30–100)
ALBUMIN SERPL-MCNC: 4.1 G/DL (ref 3.5–5.2)
ALBUMIN UR-MCNC: 2 MG/DL
ALBUMIN/GLOB SERPL: 1.5 G/DL
ALP SERPL-CCNC: 59 U/L (ref 39–117)
ALT SERPL W P-5'-P-CCNC: 14 U/L (ref 1–33)
ANION GAP SERPL CALCULATED.3IONS-SCNC: 5.1 MMOL/L (ref 5–15)
AST SERPL-CCNC: 19 U/L (ref 1–32)
BACTERIA UR QL AUTO: ABNORMAL /HPF
BASOPHILS # BLD AUTO: 0.02 10*3/MM3 (ref 0–0.2)
BASOPHILS NFR BLD AUTO: 0.5 % (ref 0–1.5)
BILIRUB SERPL-MCNC: 0.6 MG/DL (ref 0–1.2)
BILIRUB UR QL STRIP: NEGATIVE
BUN SERPL-MCNC: 16 MG/DL (ref 8–23)
BUN/CREAT SERPL: 14.4 (ref 7–25)
CALCIUM SPEC-SCNC: 9.9 MG/DL (ref 8.6–10.5)
CHLORIDE SERPL-SCNC: 107 MMOL/L (ref 98–107)
CHOLEST SERPL-MCNC: 190 MG/DL (ref 0–200)
CLARITY UR: CLEAR
CO2 SERPL-SCNC: 28.9 MMOL/L (ref 22–29)
COLOR UR: YELLOW
CREAT SERPL-MCNC: 1.11 MG/DL (ref 0.57–1)
CREAT UR-MCNC: 149.7 MG/DL
DEPRECATED RDW RBC AUTO: 41.6 FL (ref 37–54)
EGFRCR SERPLBLD CKD-EPI 2021: 48.8 ML/MIN/1.73
EOSINOPHIL # BLD AUTO: 0.06 10*3/MM3 (ref 0–0.4)
EOSINOPHIL NFR BLD AUTO: 1.5 % (ref 0.3–6.2)
ERYTHROCYTE [DISTWIDTH] IN BLOOD BY AUTOMATED COUNT: 12.4 % (ref 12.3–15.4)
GLOBULIN UR ELPH-MCNC: 2.7 GM/DL
GLUCOSE SERPL-MCNC: 103 MG/DL (ref 65–99)
GLUCOSE UR STRIP-MCNC: NEGATIVE MG/DL
HCT VFR BLD AUTO: 39 % (ref 34–46.6)
HDLC SERPL-MCNC: 84 MG/DL (ref 40–60)
HGB BLD-MCNC: 13.1 G/DL (ref 12–15.9)
HGB UR QL STRIP.AUTO: NEGATIVE
HYALINE CASTS UR QL AUTO: ABNORMAL /LPF
IMM GRANULOCYTES # BLD AUTO: 0 10*3/MM3 (ref 0–0.05)
IMM GRANULOCYTES NFR BLD AUTO: 0 % (ref 0–0.5)
KETONES UR QL STRIP: NEGATIVE
LDLC SERPL CALC-MCNC: 96 MG/DL (ref 0–100)
LDLC/HDLC SERPL: 1.14 {RATIO}
LEUKOCYTE ESTERASE UR QL STRIP.AUTO: ABNORMAL
LYMPHOCYTES # BLD AUTO: 1.58 10*3/MM3 (ref 0.7–3.1)
LYMPHOCYTES NFR BLD AUTO: 39.9 % (ref 19.6–45.3)
MCH RBC QN AUTO: 30.8 PG (ref 26.6–33)
MCHC RBC AUTO-ENTMCNC: 33.6 G/DL (ref 31.5–35.7)
MCV RBC AUTO: 91.8 FL (ref 79–97)
MICROALBUMIN/CREAT UR: 13.4 MG/G
MONOCYTES # BLD AUTO: 0.48 10*3/MM3 (ref 0.1–0.9)
MONOCYTES NFR BLD AUTO: 12.1 % (ref 5–12)
NEUTROPHILS NFR BLD AUTO: 1.82 10*3/MM3 (ref 1.7–7)
NEUTROPHILS NFR BLD AUTO: 46 % (ref 42.7–76)
NITRITE UR QL STRIP: NEGATIVE
NRBC BLD AUTO-RTO: 0 /100 WBC (ref 0–0.2)
PH UR STRIP.AUTO: 5.5 [PH] (ref 5–8)
PLATELET # BLD AUTO: 192 10*3/MM3 (ref 140–450)
PMV BLD AUTO: 11 FL (ref 6–12)
POTASSIUM SERPL-SCNC: 4.2 MMOL/L (ref 3.5–5.2)
PROT SERPL-MCNC: 6.8 G/DL (ref 6–8.5)
PROT UR QL STRIP: ABNORMAL
RBC # BLD AUTO: 4.25 10*6/MM3 (ref 3.77–5.28)
RBC # UR STRIP: ABNORMAL /HPF
REF LAB TEST METHOD: ABNORMAL
SODIUM SERPL-SCNC: 141 MMOL/L (ref 136–145)
SP GR UR STRIP: 1.02 (ref 1–1.03)
SQUAMOUS #/AREA URNS HPF: ABNORMAL /HPF
TRIGL SERPL-MCNC: 53 MG/DL (ref 0–150)
TSH SERPL DL<=0.05 MIU/L-ACNC: 0.98 UIU/ML (ref 0.27–4.2)
UROBILINOGEN UR QL STRIP: ABNORMAL
VLDLC SERPL-MCNC: 10 MG/DL (ref 5–40)
WBC # UR STRIP: ABNORMAL /HPF
WBC NRBC COR # BLD: 3.96 10*3/MM3 (ref 3.4–10.8)

## 2023-06-02 PROCEDURE — 84443 ASSAY THYROID STIM HORMONE: CPT

## 2023-06-02 PROCEDURE — 81001 URINALYSIS AUTO W/SCOPE: CPT

## 2023-06-02 PROCEDURE — 80053 COMPREHEN METABOLIC PANEL: CPT

## 2023-06-02 PROCEDURE — 82570 ASSAY OF URINE CREATININE: CPT

## 2023-06-02 PROCEDURE — 82306 VITAMIN D 25 HYDROXY: CPT

## 2023-06-02 PROCEDURE — 82043 UR ALBUMIN QUANTITATIVE: CPT

## 2023-06-02 PROCEDURE — 85025 COMPLETE CBC W/AUTO DIFF WBC: CPT

## 2023-06-02 PROCEDURE — 80061 LIPID PANEL: CPT

## 2023-06-02 RX ORDER — ASPIRIN 81 MG/1
81 TABLET, CHEWABLE ORAL ONCE
Status: SHIPPED | OUTPATIENT
Start: 2023-06-02

## 2023-06-02 NOTE — PATIENT INSTRUCTIONS
-Decrease Aspirin to 81mg  -Continue simvistatin  -Goal blood pressure <140/80  -911 for stroke symptoms (unilateral weakness, numbness, speech difficulties, balance difficulties, vision changes, or sudden severe headache)  -Follow-up labs with PCP

## 2023-06-02 NOTE — LETTER
"2023     Brisa Lord MD   [address]  [address]  [facility] [address]    Patient: Kamille Miles   YOB: 1935   Date of Visit: 2023       Dear Dr. Pop MD:    Today I saw Mrs. Kamille Miles in clinic for 9-month follow-up s/p TIA. Below are the relevant portions of my assessment and plan of care.    If you have questions, please do not hesitate to call me. I look forward to following Kamille along with you.         Sincerely,        [facility]        CC: No Recipients    Williams Wassermanie Yonatan, APRWALT  [phone]  Signed      Follow Up Office Visit      Encounter Date: 2023   Patient Name: Kamille Miles  : 1935   MRN: [id number]   PCP: Brisa Lord MD    Chief Complaint:    Chief Complaint   Patient presents with   • Follow-up       History of Present Illness: Kamille Miles is a 87 y.o. female, life-long non-smoker, with known medical diagnoses of essential hypertension and hyperlipidemia who presents to stroke clinic today for 10 month follow-up s/p TIA.    Clinic visit 2022; per Yenny Dave APRN: Patient presented to [facility] ED on 2022 with concerns of transient nonsensical speech and gait imbalance.  Patient was found to be COVID-positive on this admission.  MRI at at the time was negative for acute infarct patient was diagnosed with a TIA and discharged on Aspirin 325mg daily. She presents to clinic today for hospital follow up and to establish care. Patient states that since her hospitalization she feels that she is back to her baseline. She continues to take her medications as prescribed and without issue. She denies any new onset of stroke-like symptoms or neurological deficits. She states that her and her  are \"snowbirds\" and will be leaving for [place] in 3 weeks and will not return until May.     Clinic visit 2023: Since her last follow-up the patient denies any new or recurrent strokelike symptoms.  She tells " me she has remained compliant with her  mg and simvastatin 10 mg daily.  She does note increased bruising with a full-strength aspirin and would like to decrease this to a baby aspirin if at all possible.  She tells me that she is independent of all of her ADLs and is able to ambulate without an assistive device.  She denies any recent falls.    Subjective        I have reviewed and the following portions of the patient's history were updated as appropriate: past family history, past medical history, past social history, past surgical history and problem list.    Medications:     Current Outpatient Medications:   •  Calcium 500-100 MG-UNIT chewable tablet, Chew 1 tablet Every 4 (Four) Hours As Needed., Disp: , Rfl:   •  cholecalciferol (Vitamin D, Cholecalciferol,) 25 MCG (1000 UT) tablet, Take 1 tablet by mouth Daily., Disp: 60 tablet, Rfl: 5  •  cyanocobalamin (VITAMIN B-12) 500 MCG tablet, Take 1 tablet by mouth Daily., Disp: 90 tablet, Rfl: 1  •  famotidine (PEPCID) 20 MG tablet, Take 1 tablet by mouth Every Night., Disp: 90 tablet, Rfl: 3  •  fluticasone (FLONASE) 50 MCG/ACT nasal spray, 1 spray into the nostril(s) as directed by provider 2 (Two) Times a Day. (Patient taking differently: 1 spray into the nostril(s) as directed by provider 2 (Two) Times a Day. PRN), Disp: 16 g, Rfl: 5  •  hydrocortisone 1 % cream, Apply  topically to the appropriate area as directed 2 (Two) Times a Day As Needed for Rash (itching)., Disp: 1 each, Rfl: 0  •  Hydrocortisone, Perianal, (ANUSOL-HC) 2.5 % rectal cream, Insert  into the rectum 2 (Two) Times a Day As Needed for Hemorrhoids., Disp: 28 g, Rfl: 0  •  lisinopril (PRINIVIL,ZESTRIL) 5 MG tablet, TAKE 1 TABLET BY MOUTH DAILY, Disp: 90 tablet, Rfl: 1  •  Probiotic Product (TRUBIOTICS PO), Take 1 capsule by mouth Daily., Disp: , Rfl:   •  simvastatin (ZOCOR) 10 MG tablet, TAKE 1 TABLET BY MOUTH EVERY EVENING, Disp: 90 tablet, Rfl: 1    Current Facility-Administered  "Medications:   •  aspirin chewable tablet 81 mg, 81 mg, Oral, Once, Sis Wasserman, FABIANA    Allergies:   No Known Allergies    Review of Systems   Constitutional: Negative.    HENT: Positive for hearing loss. Negative for nosebleeds.    Eyes: Negative.  Negative for visual disturbance.   Respiratory: Negative.  Negative for chest tightness and shortness of breath.    Cardiovascular: Negative.  Negative for chest pain and palpitations.   Gastrointestinal: Negative.  Negative for blood in stool.   Genitourinary: Negative.  Negative for hematuria.   Musculoskeletal: Negative.  Negative for gait problem.   Skin: Negative.    Neurological: Negative.    Hematological: Bruises/bleeds easily.   Psychiatric/Behavioral: Negative.         Objective     Physical Exam:   Vital Signs:   Vitals:    06/02/23 1007   BP: 122/76   Pulse: 78   Temp: 98.5 °F (36.9 °C)   SpO2: 95%   Weight: 55.8 kg (123 lb)   Height: 167.6 cm (65.98\")     Body mass index is 19.86 kg/m².    Physical Exam  Vitals and nursing note reviewed.   Constitutional:       General: She is not in acute distress.     Appearance: Normal appearance. She is not ill-appearing.   HENT:      Head: Normocephalic and atraumatic.      Mouth/Throat:      Mouth: Mucous membranes are moist.   Eyes:      Extraocular Movements: Extraocular movements intact.      Pupils: Pupils are equal, round, and reactive to light.   Cardiovascular:      Rate and Rhythm: Normal rate and regular rhythm.      Heart sounds: Normal heart sounds.   Pulmonary:      Effort: Pulmonary effort is normal. No respiratory distress.      Breath sounds: Normal breath sounds.      Comments: On room air  Abdominal:      General: Bowel sounds are normal.   Musculoskeletal:      Right lower leg: No edema.      Left lower leg: No edema.   Skin:     General: Skin is warm and dry.      Findings: Bruising present.   Neurological:      General: No focal deficit present.      Mental Status: She is alert and " oriented to person, place, and time.      Motor: Motor strength is normal.      Coordination: Coordination is intact.   Psychiatric:         Mood and Affect: Mood normal.         Speech: Speech normal.         Behavior: Behavior normal.       Neurological Exam  Mental Status  Alert. Oriented to person, place, time and situation. Oriented to person, place, and time. Speech is normal. Language is fluent with no aphasia. Attention and concentration are normal. Fund of knowledge is appropriate for level of education.    Cranial Nerves  CN II: Visual fields full to confrontation.  CN III, IV, VI: Extraocular movements intact bilaterally. Pupils equal round and reactive to light bilaterally.  CN V: Facial sensation is normal.  CN VII: Full and symmetric facial movement.  CN VIII: Hearing intact bilaterally.  CN IX, X: Palate elevates symmetrically  CN XII: Tongue midline without atrophy or fasciculations.    Motor  Normal muscle bulk throughout. No fasciculations present. Normal muscle tone. Strength is 5/5 throughout all four extremities.    Sensory  Sensation is intact to light touch, pinprick, vibration and proprioception in all four extremities.    Coordination    Finger-to-nose, rapid alternating movements and heel-to-shin normal bilaterally without dysmetria.    Gait  Casual gait is normal including stance, stride, and arm swing.       NIH Stroke Scale    1a  Level of consciousness: 0=alert; keenly responsive   1b. LOC questions:  0=Answers both questions correctly    1c. LOC commands: 0=Performs both tasks correctly   2.  Best Gaze: 0=normal   3. Visual: 0=No visual loss   4. Facial Palsy: 0=Normal symmetric movement   5a. Motor left arm: 0=No drift, limb holds 90 (or 45) degrees for full 10 seconds   5b.  Motor right arm: 0=No drift, limb holds 90 (or 45) degrees for full 10 seconds   6a. Motor left le=No drift, limb holds 90 (or 45) degrees for full 10 seconds   6b  Motor right le=No drift, limb holds 90  (or 45) degrees for full 10 seconds   7. Limb Ataxia: 0=Absent   8.  Sensory: 0=Normal; no sensory loss   9. Best Language:  0=No aphasia, normal   10. Dysarthria: 0=Normal   11. Extinction and Inattention: 0=No abnormality    Total:   0         Modified Enoc Scale: 0          0  No Symptoms    1 No significant disability. Able to carry out all usual activities, despite some symptoms.    2 Slight disability. Able to look after own affairs without assistance, but unable to carry out all previous activities.    3 Moderate disability. Requires some help, but able to walk unassisted.    4 Moderately severe disability. Unable to attend to own bodily needs without assistance, and unable to walk unassisted.    5 Severe disability. Requires constant nursing care and attention, bedridden, incontinent.    6 Dead      PHQ-9 Depression Screening  Little interest or pleasure in doing things? 0-->not at all   Feeling down, depressed, or hopeless? 0-->not at all   Trouble falling or staying asleep, or sleeping too much?     Feeling tired or having little energy?     Poor appetite or overeating?     Feeling bad about yourself - or that you are a failure or have let yourself or your family down?     Trouble concentrating on things, such as reading the newspaper or watching television?     Moving or speaking so slowly that other people could have noticed? Or the opposite - being so fidgety or restless that you have been moving around a lot more than usual?     Thoughts that you would be better off dead, or of hurting yourself in some way?     PHQ-9 Total Score 0   If you checked off any problems, how difficult have these problems made it for you to do your work, take care of things at home, or get along with other people?         Results Reviewed    No new results to review    Assessment / Plan      Assessment/Plan:   Diagnoses and all orders for this visit:    1. History of TIA (transient ischemic attack) (Primary), etiology  likely secondary to hypercoagulable state during COVID-19 infection  -Okay to decrease aspirin to 81 mg  -Continue risk factor modification strategies   -Continue exercise regimen  -911 for strokelike symptoms  -Patient had yearly labs with primary care physician drawn this morning.  She has a yearly follow-up scheduled later this month.    2. Benign essential hypertension  -Patient tells me her blood pressures been adequately controlled, 120-130/60-70  -Continue antihypertensive medications per primary care recommendations    3. Mixed hyperlipidemia  -Last LDL 88, goal <70  -Continue simvastatin 10 mg daily       Discussed the importance of medication compliance (ASA 81 mg and simvastatin 10 mg) and lifestyle modifications (adequate blood pressure control, adequate control of hyperlipidemia, and continued physical activity regimen) to help reduce the risk of future cerebrovascular events.  Also discussed the signs symptoms that would warrant the patient return back to the emergency department including unilateral weakness, unilateral numbness, visual disturbances, loss of balance, speech difficulties, and/or a sudden severe headache.  Patient voices understanding.  She has been encouraged to contact our office should she have any questions or concerns in the future.    Follow Up:   Return if symptoms worsen or fail to improve.    Time spent 15 minutes    FABIANA Luz  [facility] [place] [facility]

## 2023-06-19 ENCOUNTER — OFFICE VISIT (OUTPATIENT)
Dept: INTERNAL MEDICINE | Facility: CLINIC | Age: 88
End: 2023-06-19
Payer: MEDICARE

## 2023-06-19 VITALS
WEIGHT: 121.2 LBS | DIASTOLIC BLOOD PRESSURE: 78 MMHG | OXYGEN SATURATION: 95 % | SYSTOLIC BLOOD PRESSURE: 170 MMHG | TEMPERATURE: 98 F | BODY MASS INDEX: 20.19 KG/M2 | HEIGHT: 65 IN | HEART RATE: 80 BPM

## 2023-06-19 DIAGNOSIS — M25.472 ANKLE EDEMA, BILATERAL: ICD-10-CM

## 2023-06-19 DIAGNOSIS — M25.561 CHRONIC PAIN OF RIGHT KNEE: Chronic | ICD-10-CM

## 2023-06-19 DIAGNOSIS — K21.9 GASTROESOPHAGEAL REFLUX DISEASE WITHOUT ESOPHAGITIS: Chronic | ICD-10-CM

## 2023-06-19 DIAGNOSIS — E78.2 MIXED HYPERLIPIDEMIA: ICD-10-CM

## 2023-06-19 DIAGNOSIS — I10 BENIGN ESSENTIAL HYPERTENSION: Chronic | ICD-10-CM

## 2023-06-19 DIAGNOSIS — M81.0 SENILE OSTEOPOROSIS: ICD-10-CM

## 2023-06-19 DIAGNOSIS — F51.01 PRIMARY INSOMNIA: ICD-10-CM

## 2023-06-19 DIAGNOSIS — M25.471 ANKLE EDEMA, BILATERAL: ICD-10-CM

## 2023-06-19 DIAGNOSIS — R14.1 GAS PAIN: ICD-10-CM

## 2023-06-19 DIAGNOSIS — Z86.73 HISTORY OF TIA (TRANSIENT ISCHEMIC ATTACK): ICD-10-CM

## 2023-06-19 DIAGNOSIS — R35.1 NOCTURIA: ICD-10-CM

## 2023-06-19 DIAGNOSIS — G89.29 CHRONIC PAIN OF RIGHT KNEE: Chronic | ICD-10-CM

## 2023-06-19 DIAGNOSIS — Z00.00 MEDICARE ANNUAL WELLNESS VISIT, SUBSEQUENT: Primary | ICD-10-CM

## 2023-06-19 RX ORDER — LISINOPRIL 5 MG/1
5 TABLET ORAL DAILY
Qty: 90 TABLET | Refills: 1 | Status: SHIPPED | OUTPATIENT
Start: 2023-06-19

## 2023-06-19 RX ORDER — ZOLPIDEM TARTRATE 10 MG/1
5 TABLET ORAL NIGHTLY PRN
Qty: 30 TABLET | Refills: 1 | Status: SHIPPED | OUTPATIENT
Start: 2023-06-19

## 2023-06-19 RX ORDER — ASPIRIN 81 MG/1
81 TABLET ORAL DAILY
COMMUNITY

## 2023-06-19 RX ORDER — FAMOTIDINE 20 MG/1
20 TABLET, FILM COATED ORAL NIGHTLY
Qty: 90 TABLET | Refills: 3 | Status: SHIPPED | OUTPATIENT
Start: 2023-06-19

## 2023-06-19 NOTE — ASSESSMENT & PLAN NOTE
No improvement with detrol or oxybutynin or myrbetriq.  She will continue to avoid carbonation and caffeine, especially close to bedtime.

## 2023-06-19 NOTE — ASSESSMENT & PLAN NOTE
She is advised to use a cold pack on the knee as needed for pain, inflammation, or swelling. Take Tylenol as needed. She could also use Voltaren gel as needed up to 4 times per day. She will follow up with Dr. Mj Wong.

## 2023-06-19 NOTE — ASSESSMENT & PLAN NOTE
She may continue Ambien occasionally as needed. She is advised to try taking even less than 0.5 tablet.    We discussed sleep hygiene including going to bed at the same time and getting up at the same time every day, going to bed early enough to get 7 or 8 hours in bed, reading and relaxing before bedtime, and avoiding the TV, computer, phone, iPad close to bedtime.

## 2023-06-19 NOTE — PROGRESS NOTES
The ABCs of the Annual Wellness Visit  Initial Medicare Wellness Visit    Chief Complaint   Patient presents with    Hypertension    Medicare Wellness-subsequent       Subjective   History of Present Illness:  Kamille Miles is a 88 y.o. female who presents for an Initial Medicare Wellness Visit.    CHRONIC CONDITIONS:    She is feeling well.    Urinary frequency   The patient has tried several medication for urinary frequency and nocturia. She recently tried Detrol every evening for approximately 3 weeks and had no improvement. The patient notes if she has caffeine or carbonated beverages it does make the nocturia worse. She denies any dysuria. The patient does occasionally have a little bit of urine leakage before she gets to the bathroom during the night. She has seen Dr. Eli in the past. He did not recommend anything different to her.    Hypertension  Blood pressures at home are running about 120/70 mmHg. The patient takes lisinopril daily. She does avoid salt in the diet.    Hyperlipidemia  She takes simvastatin every evening and avoids fats and sugars in the diet. The patient also tries to exercise regularly.    Osteoporosis  She does try to get some weightbearing exercises and goes to classes, but not exercising or walking as much lately due to knee pain.    History of TIA  It happened after she was diagnosed with COVID-19, and she had only had 1 day's worth of Paxlovid when she had memory problems and some dysarthria. TIA diagnosed at the ER. Her symptoms totally resolved. She did take 325 mg coated-aspirin daily for a few months after that. The patient will continue taking 81 mg aspirin daily and simvastatin every evening.    Insomnia  The patient takes Ambien occasionally. She waits until she has been unable to sleep a few nights in a row, then she takes 0.5 a tablet.    Immunizations  She had a COVID-19 vaccine in [place] in 10/2022, and she had a pneumococcal vaccine in [place] at the [facility]. She  will get us the dates on those.        HEALTH RISK ASSESSMENT    Recent Hospitalizations:  This patient has had a [facility] admission record on file within the last 365 days.    Current Medical Providers:  Patient Care Team:  Brisa Lord MD as PCP - General  Brisa Lord MD as PCP - Family Medicine  Td Eli MD as Consulting Physician (Urology)    Smoking Status:  Social History     Tobacco Use   Smoking Status Never    Passive exposure: Never   Smokeless Tobacco Never       Alcohol Consumption:  Social History     Substance and Sexual Activity   Alcohol Use Yes    Alcohol/week: 5.0 standard drinks    Types: 5 Glasses of wine per week    Comment: 4-5 glasses per week of wine       Depression Screen:       2023    11:44 AM   PHQ-2/PHQ-9 Depression Screening   Little Interest or Pleasure in Doing Things 0-->not at all   Feeling Down, Depressed or Hopeless 0-->not at all   PHQ-9: Brief Depression Severity Measure Score 0       Fall Risk Screen:  HARLAN Fall Risk Assessment was completed, and patient is at LOW risk for falls.Assessment completed on:2023    Health Habits and Functional and Cognitive Screenin/14/2021     3:04 PM   Functional & Cognitive Status   Do you have difficulty preparing food and eating? No   Do you have difficulty bathing yourself, getting dressed or grooming yourself? No   Do you have difficulty using the toilet? No   Do you have difficulty moving around from place to place? No   Do you have trouble with steps or getting out of a bed or a chair? No   Current Diet Well Balanced Diet   Dental Exam Up to date   Eye Exam Up to date   Exercise (times per week) 4 times per week   Current Exercise Activities Include Walking   Do you need help using the phone?  No   Are you deaf or do you have serious difficulty hearing?  No   Do you need help with transportation? No   Do you need help shopping? No   Do you need help preparing meals?  No   Do you need help  with housework?  No   Do you need help with laundry? No   Do you need help taking your medications? No   Do you need help managing money? No   Do you ever drive or ride in a car without wearing a seat belt? No   Have you felt unusual stress, anger or loneliness in the last month? No   Who do you live with? Spouse   If you need help, do you have trouble finding someone available to you? No   Have you been bothered in the last four weeks by sexual problems? No   Do you have difficulty concentrating, remembering or making decisions? No         Age-appropriate Screening Schedule:  Refer to the list below for future screening recommendations based on patient's age, sex and/or medical conditions. Orders for these recommended tests are listed in the plan section. The patient has been provided with a written plan.    Health Maintenance   Topic Date Due    COVID-19 Vaccine (7 - Moderna series) 08/12/2022    ANNUAL WELLNESS VISIT  06/16/2023    INFLUENZA VACCINE  10/01/2023    LIPID PANEL  06/02/2024    DXA SCAN  06/22/2024    TDAP/TD VACCINES (3 - Td or Tdap) 06/17/2032    Pneumococcal Vaccine 65+  Completed    ZOSTER VACCINE  Completed    MAMMOGRAM  Discontinued        The following portions of the patient's history were reviewed and updated as appropriate: allergies, current medications, past family history, past medical history, past social history, past surgical history, and problem list.    Does the patient have evidence of cognitive impairment? No    Aspirin is on active medication list. Aspirin use is indicated based on review of current medical condition/s. Pros and cons of this therapy have been discussed today. Benefits of this medication outweigh potential harm.  Patient has been encouraged to continue taking this medication.  .      Outpatient Medications Prior to Visit   Medication Sig Dispense Refill    aspirin 81 MG EC tablet Take 1 tablet by mouth Daily.      Calcium 500-100 MG-UNIT chewable tablet Chew 1  tablet Every 4 (Four) Hours As Needed.      cholecalciferol (Vitamin D, Cholecalciferol,) 25 MCG (1000 UT) tablet Take 1 tablet by mouth Daily. 60 tablet 5    cyanocobalamin (VITAMIN B-12) 500 MCG tablet Take 1 tablet by mouth Daily. 90 tablet 1    fluticasone (FLONASE) 50 MCG/ACT nasal spray 1 spray into the nostril(s) as directed by provider 2 (Two) Times a Day. (Patient taking differently: 1 spray into the nostril(s) as directed by provider 2 (Two) Times a Day. PRN) 16 g 5    hydrocortisone 1 % cream Apply  topically to the appropriate area as directed 2 (Two) Times a Day As Needed for Rash (itching). 1 each 0    Hydrocortisone, Perianal, (ANUSOL-HC) 2.5 % rectal cream Insert  into the rectum 2 (Two) Times a Day As Needed for Hemorrhoids. 28 g 0    Probiotic Product (TRUBIOTICS PO) Take 1 capsule by mouth Daily.      simvastatin (ZOCOR) 10 MG tablet TAKE 1 TABLET BY MOUTH EVERY EVENING 90 tablet 1    famotidine (PEPCID) 20 MG tablet Take 1 tablet by mouth Every Night. (Patient taking differently: Take 1 tablet by mouth At Night As Needed.) 90 tablet 3    lisinopril (PRINIVIL,ZESTRIL) 5 MG tablet TAKE 1 TABLET BY MOUTH DAILY 90 tablet 1     Facility-Administered Medications Prior to Visit   Medication Dose Route Frequency Provider Last Rate Last Admin    aspirin chewable tablet 81 mg  81 mg Oral Once Sis Wasserman APRN           Patient Active Problem List   Diagnosis    Urinary frequency    Light-headed feeling    Mixed hyperlipidemia    Senile osteoporosis    Primary insomnia    Nocturia    Status post carpal tunnel release    Perennial allergic rhinitis with seasonal variation    Ankle edema, bilateral    External hemorrhoid    Osteoarthritis    Sinus headache    Benign essential hypertension    Other fatigue    Bilateral hearing loss due to cerumen impaction    Heartburn    Chronic pain of right knee    Numbness and tingling of both feet    Abnormal weight loss    Gastroesophageal reflux disease  "without esophagitis    History of TIA (transient ischemic attack)    COVID-19 virus infection    Gas pain       Advanced Care Planning:  Advance Directive is not on file.  ACP discussion was held with the patient during this visit. Patient does not have an advance directive, information provided.    Review of Systems    Compared to one year ago, the patient feels her physical health is the same.  Compared to one year ago, the patient feels her mental health is the same.    Reviewed chart for potential of high risk medication in the elderly: yes  Reviewed chart for potential of harmful drug interactions in the elderly:yes    Objective         Vitals:    06/19/23 1141   BP: 170/78   BP Location: Left arm   Patient Position: Sitting   Cuff Size: Adult   Pulse: 80   Temp: 98 °F (36.7 °C)   TempSrc: Infrared   SpO2: 95%   Weight: 55 kg (121 lb 3.2 oz)   Height: 166 cm (65.35\")   PainSc:   5   PainLoc: Knee     BMI is within normal parameters. No other follow-up for BMI required.    Body mass index is 19.95 kg/m².  Discussed the patient's BMI with her. The BMI is in the acceptable range.    Physical Exam  Vitals and nursing note reviewed.   Constitutional:       Appearance: She is well-developed.   Eyes:      Conjunctiva/sclera: Conjunctivae normal.      Pupils: Pupils are equal, round, and reactive to light.   Neck:      Thyroid: No thyromegaly.   Cardiovascular:      Rate and Rhythm: Normal rate and regular rhythm.      Heart sounds: Normal heart sounds. No murmur heard.  Pulmonary:      Effort: Pulmonary effort is normal.      Breath sounds: Normal breath sounds. No wheezing.   Chest:   Breasts:     Right: No inverted nipple, mass, nipple discharge, skin change or tenderness.      Left: No inverted nipple, mass, nipple discharge, skin change or tenderness.   Abdominal:      General: Bowel sounds are normal. There is no distension.      Palpations: Abdomen is soft. There is no mass.      Tenderness: There is no " abdominal tenderness.   Musculoskeletal:         General: No tenderness. Normal range of motion.      Cervical back: Normal range of motion and neck supple.      Comments: There is a little bit of puffiness proximal to the right knee. No tenderness. No joint effusion.   Lymphadenopathy:      Cervical: No cervical adenopathy.   Skin:     General: Skin is warm and dry.      Findings: No rash.   Neurological:      Mental Status: She is alert and oriented to person, place, and time.      Cranial Nerves: No cranial nerve deficit.      Sensory: No sensory deficit.      Coordination: Coordination normal.      Gait: Gait normal.   Psychiatric:         Speech: Speech normal.         Behavior: Behavior normal.         Thought Content: Thought content normal.         Judgment: Judgment normal.         ECG 12 Lead    Date/Time: 6/19/2023 6:37 PM  Performed by: Brisa Lord MD  Authorized by: Brisa Lord MD   Comparison: compared with previous ECG   Similar to previous ECG  Rhythm: sinus rhythm  Ectopy: atrial premature contractions  Rate: normal  BPM: 72  Conduction: conduction normal  ST Segments: ST segments normal  T Waves: T waves normal  QRS axis: normal    Clinical impression: abnormal EKG        Lab Results   Component Value Date    TRIG 53 06/02/2023    HDL 84 (H) 06/02/2023    LDL 96 06/02/2023    VLDL 10 06/02/2023        Assessment & Plan   Medicare Risks and Personalized Health Plan  CMS Preventative Services Quick Reference  Cardiovascular risk  Osteoporosis Risk    The above risks/problems have been discussed with the patient.  Pertinent information has been shared with the patient in the After Visit Summary.  Follow up plans and orders are seen below in the Assessment/Plan Section.  Patient Instructions   Problem List Items Addressed This Visit          Cardiac and Vasculature    Benign essential hypertension (Chronic)    Overview     Taking 5 mg lisinopril daily.           Current Assessment & Plan      Continue lisinopril daily.          Relevant Medications    lisinopril (PRINIVIL,ZESTRIL) 5 MG tablet    Other Relevant Orders    CBC & Differential (Completed)    Comprehensive Metabolic Panel (Completed)    Microalbumin / Creatinine Urine Ratio - Urine, Clean Catch (Completed)    Urinalysis With Microscopic - Urine, Clean Catch (Completed)    Mixed hyperlipidemia    Overview     Taking simvastatin every evening.           Current Assessment & Plan     She will continue simvastatin every evening.         Relevant Medications    simvastatin (ZOCOR) 10 MG tablet    Other Relevant Orders    Lipid Panel (Completed)    TSH (Completed)       Gastrointestinal Abdominal     Gastroesophageal reflux disease without esophagitis (Chronic)    Current Assessment & Plan     Take famotidine every evening.  She is advised to take famotidine every evening.         Relevant Medications    famotidine (PEPCID) 20 MG tablet    Gas pain    Current Assessment & Plan     She will continue taking generic Gas-X or other simethicone product as needed. She may take it several times per day if needed. She will take the famotidine every evening to decrease acid reflux.            Genitourinary and Reproductive     Nocturia    Current Assessment & Plan     No improvement with detrol or oxybutynin or myrbetriq.  She will continue to avoid carbonation and caffeine, especially close to bedtime.            Musculoskeletal and Injuries    Chronic pain of right knee (Chronic)    Overview     Had steroid injection per Dr. Wong about a month ago.         Current Assessment & Plan     She is advised to use a cold pack on the knee as needed for pain, inflammation, or swelling. Take Tylenol as needed. She could also use Voltaren gel as needed up to 4 times per day. She will follow up with Dr. Mj Wong.         Senile osteoporosis    Overview     1/22/2020 DEXA scan showed osteopenia of the spine (T score -2.3) and mild osteopenia of the hip (T score  -1.3).  T-scores were stable.    DEXA 6/2022 shows decline in T-scores.  However it is still in the osteopenia range.  Lumbar spine has declined to -2.3.  (Osteoporosis is -2.5 or less.)  Femoral neck has declined to -1.6.            Current Assessment & Plan     She will continue weightbearing exercises. She will continue taking vitamin D3 and calcium.         Relevant Orders    Vitamin D,25-Hydroxy (Completed)    Ankle edema, bilateral    Current Assessment & Plan     She will continue to avoid salt in the diet and elevate feet when sitting at home. She will let us know if it gets worse.            Neuro    History of TIA (transient ischemic attack)    Overview     Admission [facility] 8/2022.  Acute onset dysarthria, fall and limited memory around events.  witnessed events, stated no focal weaknesses noted. Patient known COVID+ and started Paxlovid 8/10/22 prescribed by PCP, her Covid sx's had been only congestion and cough without hypoxia or concerning features.  Increased ASA to 325 mg. Continued simvastatin 10 mg nightly.  TIA symptoms resolved.  Now back on 81mg daily.         Current Assessment & Plan     She will continue taking 81 mg aspirin daily and simvastatin every evening.         Relevant Medications    aspirin chewable tablet 81 mg       Sleep    Primary insomnia    Overview     Using low dose ambien as needed.              Current Assessment & Plan     She may continue Ambien occasionally as needed. She is advised to try taking even less than 0.5 tablet.    We discussed sleep hygiene including going to bed at the same time and getting up at the same time every day, going to bed early enough to get 7 or 8 hours in bed, reading and relaxing before bedtime, and avoiding the TV, computer, phone, iPad close to bedtime.         Relevant Medications    zolpidem (AMBIEN) 10 MG tablet     Other Visit Diagnoses       Medicare annual wellness visit, subsequent    -  Primary             Follow Up:  Next  "Medicare Wellness visit to be scheduled in 1 year.    Return in about 1 year (around 6/19/2024) for Medicare Wellness. She will see her PCP in [place] in 12/2023 in the meantime.    An After Visit Summary and PPPS were given to the patient.       Additional E&M Note during same encounter follows:  Patient has multiple medical problems which are significant and separately identifiable that require additional work above and beyond the Medicare Wellness Visit.      Chief Complaint  Hypertension and Medicare Wellness-subsequent    Subjective        Kamille Miles is also being seen today for right knee pain and gas pain    Gas pain  The patient has gas pain which is improved by Gas-X. She has some acid reflux. The patient had been taking famotidine in the evenings as needed. She has not tried taking it every night.    Ankle edema  She gets a little swelling in her ankles by the end of the day. No pitting. It is always gone by the next morning.    Right knee pain  Her right knee has been more painful recently. She has had steroid injections a few times in [place]. The patient had one by Dr. Mj Wong about 1 month ago, and she states that it was helping at first, but now it is hurting again. It does swell a little bit sometimes. She has not tried a cold pack. The patient does plan to follow up with him.      Objective   Vital Signs:  /78 (BP Location: Left arm, Patient Position: Sitting, Cuff Size: Adult)   Pulse 80   Temp 98 °F (36.7 °C) (Infrared)   Ht 166 cm (65.35\")   Wt 55 kg (121 lb 3.2 oz)   SpO2 95%   BMI 19.95 kg/m²     The following data was reviewed by: Brisa Lord MD on 06/19/2023:  CMP          8/11/2022    06:37 8/11/2022    07:41 8/12/2022    03:46 6/2/2023    09:28   CMP   Glucose   91  103    BUN   11  16    Creatinine 0.80   0.94  1.11    EGFR 72.3   59.6  48.8    Sodium   141  141    Potassium   3.8  4.2    Chloride   104  107    Calcium   8.9  9.9    Total Protein    6.8    Albumin "    4.1    Globulin    2.7    Total Bilirubin    0.6    Alkaline Phosphatase    59    AST (SGOT)  20   19    ALT (SGPT)  11   14    Albumin/Globulin Ratio    1.5    BUN/Creatinine Ratio   11.7  14.4    Anion Gap   9.0  5.1      CBC          8/11/2022    06:37 8/11/2022    06:58 8/12/2022    03:46 6/2/2023    09:28   CBC   WBC  5.54  3.31  3.96    RBC  4.25  4.26  4.25    Hemoglobin 12.9  13.4  13.3  13.1    Hematocrit 38  39.0  38.8  39.0    MCV  91.8  91.1  91.8    MCH  31.5  31.2  30.8    MCHC  34.4  34.3  33.6    RDW  13.2  13.2  12.4    Platelets  180  171  192      Lipid Panel          8/12/2022    03:46 6/2/2023    09:28   Lipid Panel   Total Cholesterol 178  190    Triglycerides 65  53    HDL Cholesterol 78  84    VLDL Cholesterol 12  10    LDL Cholesterol  88  96    LDL/HDL Ratio 1.12  1.14      TSH          6/2/2023    09:28   TSH   TSH 0.977      UA          8/11/2022    07:43 5/5/2023    09:47 6/2/2023    09:28   Urinalysis   Squamous Epithelial Cells, UA   0-2    Specific Gravity, UA 1.046   1.019    Ketones, UA Negative  Negative  Negative    Blood, UA Negative   Negative    Leukocytes, UA Negative  Negative  Small (1+)    Nitrite, UA Negative   Negative    RBC, UA   None Seen    WBC, UA   3-5    Bacteria, UA   None Seen         Assessment and Plan   Diagnoses and all orders for this visit:    1. Medicare annual wellness visit, subsequent (Primary)    2. Benign essential hypertension  Assessment & Plan:  Continue lisinopril daily.     Orders:  -     CBC & Differential; Future  -     Comprehensive Metabolic Panel; Future  -     Microalbumin / Creatinine Urine Ratio - Urine, Clean Catch; Future  -     Urinalysis With Microscopic - Urine, Clean Catch; Future  -     lisinopril (PRINIVIL,ZESTRIL) 5 MG tablet; Take 1 tablet by mouth Daily.  Dispense: 90 tablet; Refill: 1    3. Mixed hyperlipidemia  Assessment & Plan:  She will continue simvastatin every evening.    Orders:  -     Lipid Panel; Future  -      TSH; Future    4. Nocturia  Assessment & Plan:  No improvement with detrol or oxybutynin or myrbetriq.  She will continue to avoid carbonation and caffeine, especially close to bedtime.      5. Chronic pain of right knee  Assessment & Plan:  She is advised to use a cold pack on the knee as needed for pain, inflammation, or swelling. Take Tylenol as needed. She could also use Voltaren gel as needed up to 4 times per day. She will follow up with Dr. Mj Wong.      6. History of TIA (transient ischemic attack)  Assessment & Plan:  She will continue taking 81 mg aspirin daily and simvastatin every evening.      7. Ankle edema, bilateral  Assessment & Plan:  She will continue to avoid salt in the diet and elevate feet when sitting at home. She will let us know if it gets worse.      8. Senile osteoporosis  Assessment & Plan:  She will continue weightbearing exercises. She will continue taking vitamin D3 and calcium.    Orders:  -     Vitamin D,25-Hydroxy; Future    9. Gas pain  Assessment & Plan:  She will continue taking generic Gas-X or other simethicone product as needed. She may take it several times per day if needed. She will take the famotidine every evening to decrease acid reflux.      10. Gastroesophageal reflux disease without esophagitis  Assessment & Plan:  Take famotidine every evening.  She is advised to take famotidine every evening.      11. Primary insomnia  Assessment & Plan:  She may continue Ambien occasionally as needed. She is advised to try taking even less than 0.5 tablet.    We discussed sleep hygiene including going to bed at the same time and getting up at the same time every day, going to bed early enough to get 7 or 8 hours in bed, reading and relaxing before bedtime, and avoiding the TV, computer, phone, iPad close to bedtime.    Orders:  -     zolpidem (AMBIEN) 10 MG tablet; Take 0.5 tablets by mouth At Night As Needed for Sleep.  Dispense: 30 tablet; Refill: 1    Other orders  -      famotidine (PEPCID) 20 MG tablet; Take 1 tablet by mouth Every Night.  Dispense: 90 tablet; Refill: 3  -     ECG 12 Lead             Follow Up   Return in about 1 year (around 6/19/2024) for Medicare Wellness. She will see her PCP in [place] in 12/2023 in the meantime.    Patient was given instructions and counseling regarding her condition or for health maintenance advice. Please see specific information pulled into the AVS if appropriate.     Transcribed from ambient dictation for Brisa Lord MD by Jannie Jo.  06/19/23   13:36 EDT    Patient or patient representative verbalized consent to the visit recording.  I have personally performed the services described in this document as transcribed by the above individual, and it is both accurate and complete.

## 2023-06-19 NOTE — ASSESSMENT & PLAN NOTE
She will continue to avoid salt in the diet and elevate feet when sitting at home. She will let us know if it gets worse.

## 2023-06-19 NOTE — PATIENT INSTRUCTIONS
Patient Instructions  Problem List Items Addressed This Visit          Cardiac and Vasculature    Benign essential hypertension (Chronic)    Overview     Taking 5 mg lisinopril daily.           Current Assessment & Plan     Continue lisinopril daily.          Relevant Medications    lisinopril (PRINIVIL,ZESTRIL) 5 MG tablet    Other Relevant Orders    CBC & Differential (Completed)    Comprehensive Metabolic Panel (Completed)    Microalbumin / Creatinine Urine Ratio - Urine, Clean Catch (Completed)    Urinalysis With Microscopic - Urine, Clean Catch (Completed)    Mixed hyperlipidemia    Overview     Taking simvastatin every evening.           Current Assessment & Plan     She will continue simvastatin every evening.         Relevant Medications    simvastatin (ZOCOR) 10 MG tablet    Other Relevant Orders    Lipid Panel (Completed)    TSH (Completed)       Gastrointestinal Abdominal     Gastroesophageal reflux disease without esophagitis (Chronic)    Current Assessment & Plan     Take famotidine every evening.  She is advised to take famotidine every evening.         Relevant Medications    famotidine (PEPCID) 20 MG tablet    Gas pain    Current Assessment & Plan     She will continue taking generic Gas-X or other simethicone product as needed. She may take it several times per day if needed. She will take the famotidine every evening to decrease acid reflux.            Genitourinary and Reproductive     Nocturia    Current Assessment & Plan     No improvement with detrol or oxybutynin or myrbetriq.  She will continue to avoid carbonation and caffeine, especially close to bedtime.            Musculoskeletal and Injuries    Chronic pain of right knee (Chronic)    Overview     Had steroid injection per Dr. Wong about a month ago.         Current Assessment & Plan     She is advised to use a cold pack on the knee as needed for pain, inflammation, or swelling. Take Tylenol as needed. She could also use Voltaren gel as  needed up to 4 times per day. She will follow up with Dr. Mj Wong.         Senile osteoporosis    Overview     1/22/2020 DEXA scan showed osteopenia of the spine (T score -2.3) and mild osteopenia of the hip (T score -1.3).  T-scores were stable.    DEXA 6/2022 shows decline in T-scores.  However it is still in the osteopenia range.  Lumbar spine has declined to -2.3.  (Osteoporosis is -2.5 or less.)  Femoral neck has declined to -1.6.            Current Assessment & Plan     She will continue weightbearing exercises. She will continue taking vitamin D3 and calcium.         Relevant Orders    Vitamin D,25-Hydroxy (Completed)    Ankle edema, bilateral    Current Assessment & Plan     She will continue to avoid salt in the diet and elevate feet when sitting at home. She will let us know if it gets worse.            Neuro    History of TIA (transient ischemic attack)    Overview     Admission [facility] 8/2022.  Acute onset dysarthria, fall and limited memory around events.  witnessed events, stated no focal weaknesses noted. Patient known COVID+ and started Paxlovid 8/10/22 prescribed by PCP, her Covid sx's had been only congestion and cough without hypoxia or concerning features.  Increased ASA to 325 mg. Continued simvastatin 10 mg nightly.  TIA symptoms resolved.  Now back on 81mg daily.         Current Assessment & Plan     She will continue taking 81 mg aspirin daily and simvastatin every evening.         Relevant Medications    aspirin chewable tablet 81 mg       Sleep    Primary insomnia    Overview     Using low dose ambien as needed.              Current Assessment & Plan     She may continue Ambien occasionally as needed. She is advised to try taking even less than 0.5 tablet.    We discussed sleep hygiene including going to bed at the same time and getting up at the same time every day, going to bed early enough to get 7 or 8 hours in bed, reading and relaxing before bedtime, and avoiding the TV,  computer, phone, iPad close to bedtime.         Relevant Medications    zolpidem (AMBIEN) 10 MG tablet     Other Visit Diagnoses       Medicare annual wellness visit, subsequent    -  Primary           Fall Prevention in the Home, Adult  Falls can cause injuries and affect people of all ages. There are many simple things that you can do to make your home safe and to help prevent falls. Ask for help when making these changes, if needed.  What actions can I take to prevent falls?  General instructions  Use good lighting in all rooms. Replace any light bulbs that burn out, turn on lights if it is dark, and use night-lights.  Place frequently used items in easy-to-reach places. Lower the shelves around your home if necessary.  Set up furniture so that there are clear paths around it. Avoid moving your furniture around.  Remove throw rugs and other tripping hazards from the floor.  Avoid walking on wet floors.  Fix any uneven floor surfaces.  Add color or contrast paint or tape to grab bars and handrails in your home. Place contrasting color strips on the first and last steps of staircases.  When you use a stepladder, make sure that it is completely opened and that the sides and supports are firmly locked. Have someone hold the ladder while you are using it. Do not climb a closed stepladder.  Know where your pets are when moving through your home.  What can I do in the bathroom?     Keep the floor dry. Immediately clean up any water that is on the floor.  Remove soap buildup in the tub or shower regularly.  Use nonskid mats or decals on the floor of the tub or shower.  Attach bath mats securely with double-sided, nonslip rug tape.  If you need to sit down while you are in the shower, use a plastic, nonslip stool.  Install grab bars by the toilet and in the tub and shower. Do not use towel bars as grab bars.  What can I do in the bedroom?  Make sure that a bedside light is easy to reach.  Do not use oversized bedding that  reaches the floor.  Have a firm chair that has side arms to use for getting dressed.  What can I do in the kitchen?  Clean up any spills right away.  If you need to reach for something above you, use a sturdy step stool that has a grab bar.  Keep electrical cables out of the way.  Do not use floor polish or wax that makes floors slippery. If you must use wax, make sure that it is non-skid floor wax.  What can I do with my stairs?  Do not leave any items on the stairs.  Make sure that you have a light switch at the top and the bottom of the stairs. Have them installed if you do not have them.  Make sure that there are handrails on both sides of the stairs. Fix handrails that are broken or loose. Make sure that handrails are as long as the staircases.  Install non-slip stair treads on all stairs in your home.  Avoid having throw rugs at the top or bottom of stairs, or secure the rugs with carpet tape to prevent them from moving.  Choose a carpet design that does not hide the edge of steps on the stairs.  Check any carpeting to make sure that it is firmly attached to the stairs. Fix any carpet that is loose or worn.  What can I do on the outside of my home?  Use bright outdoor lighting.  Regularly repair the edges of walkways and driveways and fix any cracks.  Remove high doorway thresholds.  Trim any shrubbery on the main path into your home.  Regularly check that handrails are securely fastened and in good repair. Both sides of all steps should have handrails.  Install guardrails along the edges of any raised decks or porches.  Clear walkways of debris and clutter, including tools and rocks.  Have leaves, snow, and ice cleared regularly.  Use sand or salt on walkways during winter months.  In the garage, clean up any spills right away, including grease or oil spills.  What other actions can I take?  Wear closed-toe shoes that fit well and support your feet. Wear shoes that have rubber soles or low heels.  Use  mobility aids as needed, such as canes, walkers, scooters, and crutches.  Review your medicines with your health care provider. Some medicines can cause dizziness or changes in blood pressure, which increase your risk of falling.  Talk with your health care provider about other ways that you can decrease your risk of falls. This may include working with a physical therapist or  to improve your strength, balance, and endurance.  Where to find more information  Centers for Disease Control and Prevention, [place]: www.cdc.gov  [employer] [place] on Aging: www.christopher.nih.gov  Contact a health care provider if:  You are afraid of falling at home.  You feel weak, drowsy, or dizzy at home.  You fall at home.  Summary  There are many simple things that you can do to make your home safe and to help prevent falls.  Ways to make your home safe include removing tripping hazards and installing grab bars in the bathroom.  Ask for help when making these changes in your home.  This information is not intended to replace advice given to you by your health care provider. Make sure you discuss any questions you have with your health care provider.  Document Revised: 09/19/2022 Document Reviewed: 07/21/2021  [employer] Patient Education © 2022 [employer]  Heart-Healthy Eating Plan  Many factors influence your heart (coronary) health, including eating and exercise habits. Coronary risk increases with abnormal blood fat (lipid) levels. Heart-healthy meal planning includes limiting unhealthy fats, increasing healthy fats, and making other diet and lifestyle changes.  What is my plan?  Your health care provider may recommend that you:  Limit your fat intake to _________% or less of your total calories each day.  Limit your saturated fat intake to _________% or less of your total calories each day.  Limit the amount of cholesterol in your diet to less than _________ mg per day.  What are tips for following this plan?  Cooking  Cook  foods using methods other than frying. Baking, boiling, grilling, and broiling are all good options. Other ways to reduce fat include:  Removing the skin from poultry.  Removing all visible fats from meats.  Steaming vegetables in water or broth.  Meal planning  A plate with examples of foods in a healthy diet.      At meals, imagine dividing your plate into fourths:  Fill one-half of your plate with vegetables and green salads.  Fill one-fourth of your plate with whole grains.  Fill one-fourth of your plate with lean protein foods.  Eat 4-5 servings of vegetables per day. One serving equals 1 cup raw or cooked vegetable, or 2 cups raw leafy greens.  Eat 4-5 servings of fruit per day. One serving equals 1 medium whole fruit, ¼ cup dried fruit, ½ cup fresh, frozen, or canned fruit, or ½ cup 100% fruit juice.  Eat more foods that contain soluble fiber. Examples include apples, broccoli, carrots, beans, peas, and barley. Aim to get 25-30 g of fiber per day.  Increase your consumption of legumes, nuts, and seeds to 4-5 servings per week. One serving of dried beans or legumes equals ½ cup cooked, 1 serving of nuts is ¼ cup, and 1 serving of seeds equals 1 tablespoon.  Fats  Choose healthy fats more often. Choose monounsaturated and polyunsaturated fats, such as olive and canola oils, flaxseeds, walnuts, almonds, and seeds.  Eat more omega-3 fats. Choose salmon, mackerel, sardines, tuna, flaxseed oil, and ground flaxseeds. Aim to eat fish at least 2 times each week.  Check food labels carefully to identify foods with trans fats or high amounts of saturated fat.  Limit saturated fats. These are found in animal products, such as meats, butter, and cream. Plant sources of saturated fats include palm oil, palm kernel oil, and coconut oil.  Avoid foods with partially hydrogenated oils in them. These contain trans fats. Examples are stick margarine, some tub margarines, cookies, crackers, and other baked goods.  Avoid fried  foods.  General information  Eat more home-cooked food and less restaurant, buffet, and fast food.  Limit or avoid alcohol.  Limit foods that are high in starch and sugar.  Lose weight if you are overweight. Losing just 5-10% of your body weight can help your overall health and prevent diseases such as diabetes and heart disease.  Monitor your salt (sodium) intake, especially if you have high blood pressure. Talk with your health care provider about your sodium intake.  Try to incorporate more vegetarian meals weekly.  What foods can I eat?  Fruits  All fresh, canned (in natural juice), or frozen fruits.  Vegetables  Fresh or frozen vegetables (raw, steamed, roasted, or grilled). Green salads.  Grains  Most grains. Choose whole wheat and whole grains most of the time. Rice and pasta, including brown rice and pastas made with whole wheat.  Meats and other proteins  Lean, well-trimmed beef, veal, pork, and lamb. Chicken and turkey without skin. All fish and shellfish. Wild duck, rabbit, pheasant, and venison. Egg whites or low-cholesterol egg substitutes. Dried beans, peas, lentils, and tofu. Seeds and most nuts.  Dairy  Low-fat or nonfat cheeses, including ricotta and mozzarella. Skim or 1% milk (liquid, powdered, or evaporated). Buttermilk made with low-fat milk. Nonfat or low-fat yogurt.  Fats and oils  Non-hydrogenated (trans-free) margarines. Vegetable oils, including soybean, sesame, sunflower, olive, peanut, safflower, corn, canola, and cottonseed. Salad dressings or mayonnaise made with a vegetable oil.  Beverages  Water (mineral or sparkling). Coffee and tea. Diet carbonated beverages.  Sweets and desserts  Sherbet, gelatin, and fruit ice. Small amounts of dark chocolate.  Limit all sweets and desserts.  Seasonings and condiments  All seasonings and condiments.  The items listed above may not be a complete list of foods and beverages you can eat. Contact a dietitian for more options.  What foods are not  recommended?  Fruits  Canned fruit in heavy syrup. Fruit in cream or butter sauce. Fried fruit. Limit coconut.  Vegetables  Vegetables cooked in cheese, cream, or butter sauce. Fried vegetables.  Grains  Breads made with saturated or trans fats, oils, or whole milk. Croissants. Sweet rolls. Donuts. High-fat crackers, such as cheese crackers.  Meats and other proteins  Fatty meats, such as hot dogs, ribs, sausage, grant, rib-eye roast or steak. High-fat deli meats, such as salami and bologna. Caviar. Domestic duck and goose. Organ meats, such as liver.  Dairy  Cream, sour cream, cream cheese, and creamed cottage cheese. Whole-milk cheeses. Whole or 2% milk (liquid, evaporated, or condensed). Whole buttermilk. Cream sauce or high-fat cheese sauce. Whole-milk yogurt.  Fats and oils  Meat fat, or shortening. Cocoa butter, hydrogenated oils, palm oil, coconut oil, palm kernel oil. Solid fats and shortenings, including grant fat, salt pork, lard, and butter. Nondairy cream substitutes. Salad dressings with cheese or sour cream.  Beverages  Regular sodas and any drinks with added sugar.  Sweets and desserts  Frosting. Pudding. Cookies. Cakes. Pies. Milk chocolate or white chocolate. Buttered syrups. Full-fat ice cream or ice cream drinks.  The items listed above may not be a complete list of foods and beverages to avoid. Contact a dietitian for more information.  Summary  Heart-healthy meal planning includes limiting unhealthy fats, increasing healthy fats, and making other diet and lifestyle changes.  Lose weight if you are overweight. Losing just 5-10% of your body weight can help your overall health and prevent diseases such as diabetes and heart disease.  Focus on eating a balance of foods, including fruits and vegetables, low-fat or nonfat dairy, lean protein, nuts and legumes, whole grains, and heart-healthy oils and fats.  This information is not intended to replace advice given to you by your health care provider.  Make sure you discuss any questions you have with your health care provider.  Document Revised: 04/28/2022 Document Reviewed: 04/28/2022  [employer] Patient Education © 2022 [employer]

## 2023-06-19 NOTE — ASSESSMENT & PLAN NOTE
She will continue taking generic Gas-X or other simethicone product as needed. She may take it several times per day if needed. She will take the famotidine every evening to decrease acid reflux.

## 2023-09-04 DIAGNOSIS — E78.2 MIXED HYPERLIPIDEMIA: ICD-10-CM

## 2023-09-05 RX ORDER — SIMVASTATIN 10 MG
10 TABLET ORAL EVERY EVENING
Qty: 90 TABLET | Refills: 1 | Status: SHIPPED | OUTPATIENT
Start: 2023-09-05

## 2023-10-16 RX ORDER — FAMOTIDINE 20 MG/1
20 TABLET, FILM COATED ORAL NIGHTLY
Qty: 90 TABLET | Refills: 0 | Status: SHIPPED | OUTPATIENT
Start: 2023-10-16

## 2023-11-01 DIAGNOSIS — I10 BENIGN ESSENTIAL HYPERTENSION: Chronic | ICD-10-CM

## 2023-11-01 RX ORDER — LISINOPRIL 5 MG/1
5 TABLET ORAL DAILY
Qty: 90 TABLET | Refills: 1 | Status: SHIPPED | OUTPATIENT
Start: 2023-11-01

## 2024-01-22 RX ORDER — FAMOTIDINE 20 MG/1
20 TABLET, FILM COATED ORAL NIGHTLY
Qty: 90 TABLET | Refills: 0 | Status: SHIPPED | OUTPATIENT
Start: 2024-01-22

## 2024-01-22 NOTE — TELEPHONE ENCOUNTER
Rx Refill Note  Requested Prescriptions     Pending Prescriptions Disp Refills    famotidine (PEPCID) 20 MG tablet [Pharmacy Med Name: FAMOTIDINE 20MG TABLETS] 90 tablet 0     Sig: TAKE 1 TABLET BY MOUTH EVERY NIGHT      Last office visit with prescribing clinician: 6/19/2023   Last telemedicine visit with prescribing clinician: Visit date not found   Next office visit with prescribing clinician: Visit date not found                         Would you like a call back once the refill request has been completed: [] Yes [] No    If the office needs to give you a call back, can they leave a voicemail: [] Yes [] No    Kristin Gatica RN  01/22/24, 09:23 EST

## 2024-02-01 DIAGNOSIS — E78.2 MIXED HYPERLIPIDEMIA: ICD-10-CM

## 2024-02-01 RX ORDER — SIMVASTATIN 10 MG
10 TABLET ORAL EVERY EVENING
Qty: 90 TABLET | Refills: 1 | Status: SHIPPED | OUTPATIENT
Start: 2024-02-01

## 2024-04-02 ENCOUNTER — TELEPHONE (OUTPATIENT)
Dept: INTERNAL MEDICINE | Facility: CLINIC | Age: 89
End: 2024-04-02
Payer: MEDICARE

## 2024-04-02 NOTE — TELEPHONE ENCOUNTER
Caller: Kamille Miles    Relationship: Self    Best call back number: [phone]    What orders are you requesting (i.e. lab or imaging): BLOOD WORK ORDERS FOR APPOINTMENT 06/24/2024    In what timeframe would the patient need to come in: THE WEEK BEFORE THE APPOINTMENT     Additional notes: PLEASE CALL PATIENT TO LET HER KNOW IF THE ORDERS ARE IN

## 2024-06-07 ENCOUNTER — LAB (OUTPATIENT)
Dept: LAB | Facility: HOSPITAL | Age: 89
End: 2024-06-07
Payer: MEDICARE

## 2024-06-07 DIAGNOSIS — I10 BENIGN ESSENTIAL HYPERTENSION: ICD-10-CM

## 2024-06-07 DIAGNOSIS — M81.0 SENILE OSTEOPOROSIS: ICD-10-CM

## 2024-06-07 DIAGNOSIS — E78.2 MIXED HYPERLIPIDEMIA: ICD-10-CM

## 2024-06-07 LAB
25(OH)D3 SERPL-MCNC: 96.5 NG/ML (ref 30–100)
ALBUMIN SERPL-MCNC: 4.1 G/DL (ref 3.5–5.2)
ALBUMIN UR-MCNC: 1.5 MG/DL
ALBUMIN/GLOB SERPL: 1.4 G/DL
ALP SERPL-CCNC: 67 U/L (ref 39–117)
ALT SERPL W P-5'-P-CCNC: 12 U/L (ref 1–33)
ANION GAP SERPL CALCULATED.3IONS-SCNC: 9.5 MMOL/L (ref 5–15)
AST SERPL-CCNC: 20 U/L (ref 1–32)
BACTERIA UR QL AUTO: NORMAL /HPF
BASOPHILS # BLD AUTO: 0.04 10*3/MM3 (ref 0–0.2)
BASOPHILS NFR BLD AUTO: 1 % (ref 0–1.5)
BILIRUB SERPL-MCNC: 0.5 MG/DL (ref 0–1.2)
BILIRUB UR QL STRIP: NEGATIVE
BUN SERPL-MCNC: 18 MG/DL (ref 8–23)
BUN/CREAT SERPL: 17 (ref 7–25)
CALCIUM SPEC-SCNC: 9.7 MG/DL (ref 8.6–10.5)
CHLORIDE SERPL-SCNC: 103 MMOL/L (ref 98–107)
CHOLEST SERPL-MCNC: 182 MG/DL (ref 0–200)
CLARITY UR: CLEAR
CO2 SERPL-SCNC: 27.5 MMOL/L (ref 22–29)
COLOR UR: YELLOW
CREAT SERPL-MCNC: 1.06 MG/DL (ref 0.57–1)
CREAT UR-MCNC: 78.6 MG/DL
DEPRECATED RDW RBC AUTO: 42.1 FL (ref 37–54)
EGFRCR SERPLBLD CKD-EPI 2021: 50.9 ML/MIN/1.73
EOSINOPHIL # BLD AUTO: 0.09 10*3/MM3 (ref 0–0.4)
EOSINOPHIL NFR BLD AUTO: 2.2 % (ref 0.3–6.2)
ERYTHROCYTE [DISTWIDTH] IN BLOOD BY AUTOMATED COUNT: 12.4 % (ref 12.3–15.4)
GLOBULIN UR ELPH-MCNC: 3 GM/DL
GLUCOSE SERPL-MCNC: 90 MG/DL (ref 65–99)
GLUCOSE UR STRIP-MCNC: NEGATIVE MG/DL
HCT VFR BLD AUTO: 42.3 % (ref 34–46.6)
HDLC SERPL-MCNC: 91 MG/DL (ref 40–60)
HGB BLD-MCNC: 13.8 G/DL (ref 12–15.9)
HGB UR QL STRIP.AUTO: NEGATIVE
HYALINE CASTS UR QL AUTO: NORMAL /LPF
IMM GRANULOCYTES # BLD AUTO: 0.01 10*3/MM3 (ref 0–0.05)
IMM GRANULOCYTES NFR BLD AUTO: 0.2 % (ref 0–0.5)
KETONES UR QL STRIP: NEGATIVE
LDLC SERPL CALC-MCNC: 81 MG/DL (ref 0–100)
LDLC/HDLC SERPL: 0.89 {RATIO}
LEUKOCYTE ESTERASE UR QL STRIP.AUTO: NEGATIVE
LYMPHOCYTES # BLD AUTO: 1.67 10*3/MM3 (ref 0.7–3.1)
LYMPHOCYTES NFR BLD AUTO: 41.6 % (ref 19.6–45.3)
MCH RBC QN AUTO: 30.7 PG (ref 26.6–33)
MCHC RBC AUTO-ENTMCNC: 32.6 G/DL (ref 31.5–35.7)
MCV RBC AUTO: 94 FL (ref 79–97)
MICROALBUMIN/CREAT UR: 19.1 MG/G (ref 0–29)
MONOCYTES # BLD AUTO: 0.42 10*3/MM3 (ref 0.1–0.9)
MONOCYTES NFR BLD AUTO: 10.5 % (ref 5–12)
NEUTROPHILS NFR BLD AUTO: 1.78 10*3/MM3 (ref 1.7–7)
NEUTROPHILS NFR BLD AUTO: 44.5 % (ref 42.7–76)
NITRITE UR QL STRIP: NEGATIVE
NRBC BLD AUTO-RTO: 0 /100 WBC (ref 0–0.2)
PH UR STRIP.AUTO: 6.5 [PH] (ref 5–8)
PLATELET # BLD AUTO: 205 10*3/MM3 (ref 140–450)
PMV BLD AUTO: 11.5 FL (ref 6–12)
POTASSIUM SERPL-SCNC: 4.1 MMOL/L (ref 3.5–5.2)
PROT SERPL-MCNC: 7.1 G/DL (ref 6–8.5)
PROT UR QL STRIP: NEGATIVE
RBC # BLD AUTO: 4.5 10*6/MM3 (ref 3.77–5.28)
RBC # UR STRIP: NORMAL /HPF
REF LAB TEST METHOD: NORMAL
SODIUM SERPL-SCNC: 140 MMOL/L (ref 136–145)
SP GR UR STRIP: 1.02 (ref 1–1.03)
SQUAMOUS #/AREA URNS HPF: NORMAL /HPF
TRIGL SERPL-MCNC: 52 MG/DL (ref 0–150)
TSH SERPL DL<=0.05 MIU/L-ACNC: 1.45 UIU/ML (ref 0.27–4.2)
UROBILINOGEN UR QL STRIP: NORMAL
VIT B12 BLD-MCNC: 1013 PG/ML (ref 211–946)
VLDLC SERPL-MCNC: 10 MG/DL (ref 5–40)
WBC # UR STRIP: NORMAL /HPF
WBC NRBC COR # BLD AUTO: 4.01 10*3/MM3 (ref 3.4–10.8)

## 2024-06-07 PROCEDURE — 80061 LIPID PANEL: CPT

## 2024-06-07 PROCEDURE — 84443 ASSAY THYROID STIM HORMONE: CPT

## 2024-06-07 PROCEDURE — 81001 URINALYSIS AUTO W/SCOPE: CPT

## 2024-06-07 PROCEDURE — 82306 VITAMIN D 25 HYDROXY: CPT

## 2024-06-07 PROCEDURE — 82570 ASSAY OF URINE CREATININE: CPT

## 2024-06-07 PROCEDURE — 85025 COMPLETE CBC W/AUTO DIFF WBC: CPT

## 2024-06-07 PROCEDURE — 80053 COMPREHEN METABOLIC PANEL: CPT

## 2024-06-07 PROCEDURE — 82607 VITAMIN B-12: CPT

## 2024-06-07 PROCEDURE — 82043 UR ALBUMIN QUANTITATIVE: CPT

## 2024-06-24 ENCOUNTER — OFFICE VISIT (OUTPATIENT)
Dept: INTERNAL MEDICINE | Facility: CLINIC | Age: 89
End: 2024-06-24
Payer: MEDICARE

## 2024-06-24 VITALS
WEIGHT: 120 LBS | RESPIRATION RATE: 17 BRPM | TEMPERATURE: 98.2 F | HEART RATE: 72 BPM | BODY MASS INDEX: 19.99 KG/M2 | SYSTOLIC BLOOD PRESSURE: 124 MMHG | DIASTOLIC BLOOD PRESSURE: 66 MMHG | OXYGEN SATURATION: 97 % | HEIGHT: 65 IN

## 2024-06-24 DIAGNOSIS — I10 BENIGN ESSENTIAL HYPERTENSION: Chronic | ICD-10-CM

## 2024-06-24 DIAGNOSIS — K64.4 EXTERNAL HEMORRHOID: ICD-10-CM

## 2024-06-24 DIAGNOSIS — N18.31 CHRONIC KIDNEY DISEASE, STAGE 3A: ICD-10-CM

## 2024-06-24 DIAGNOSIS — N95.9 MENOPAUSAL AND POSTMENOPAUSAL DISORDER: ICD-10-CM

## 2024-06-24 DIAGNOSIS — F51.01 PRIMARY INSOMNIA: ICD-10-CM

## 2024-06-24 DIAGNOSIS — M81.0 SENILE OSTEOPOROSIS: ICD-10-CM

## 2024-06-24 DIAGNOSIS — K58.2 IRRITABLE BOWEL SYNDROME WITH BOTH CONSTIPATION AND DIARRHEA: Chronic | ICD-10-CM

## 2024-06-24 DIAGNOSIS — E78.2 MIXED HYPERLIPIDEMIA: ICD-10-CM

## 2024-06-24 DIAGNOSIS — R26.89 POOR BALANCE: ICD-10-CM

## 2024-06-24 DIAGNOSIS — Z00.00 MEDICARE ANNUAL WELLNESS VISIT, SUBSEQUENT: Primary | ICD-10-CM

## 2024-06-24 DIAGNOSIS — K21.9 GASTROESOPHAGEAL REFLUX DISEASE WITHOUT ESOPHAGITIS: Chronic | ICD-10-CM

## 2024-06-24 PROBLEM — R12 HEARTBURN: Chronic | Status: RESOLVED | Noted: 2021-06-14 | Resolved: 2024-06-24

## 2024-06-24 PROCEDURE — 1160F RVW MEDS BY RX/DR IN RCRD: CPT | Performed by: INTERNAL MEDICINE

## 2024-06-24 PROCEDURE — 1159F MED LIST DOCD IN RCRD: CPT | Performed by: INTERNAL MEDICINE

## 2024-06-24 PROCEDURE — 1125F AMNT PAIN NOTED PAIN PRSNT: CPT | Performed by: INTERNAL MEDICINE

## 2024-06-24 PROCEDURE — G0439 PPPS, SUBSEQ VISIT: HCPCS | Performed by: INTERNAL MEDICINE

## 2024-06-24 RX ORDER — MELATONIN
2000 DAILY
Start: 2024-06-24

## 2024-06-24 RX ORDER — ZOLPIDEM TARTRATE 5 MG/1
2.5 TABLET ORAL NIGHTLY PRN
Qty: 30 TABLET | Refills: 2 | Status: SHIPPED | OUTPATIENT
Start: 2024-06-24

## 2024-06-24 RX ORDER — LISINOPRIL 5 MG/1
5 TABLET ORAL DAILY
Qty: 90 TABLET | Refills: 1 | Status: SHIPPED | OUTPATIENT
Start: 2024-06-24

## 2024-06-24 RX ORDER — HYDROCORTISONE 25 MG/G
CREAM TOPICAL 2 TIMES DAILY PRN
Qty: 28 G | Refills: 3 | Status: SHIPPED | OUTPATIENT
Start: 2024-06-24

## 2024-06-24 RX ORDER — SIMVASTATIN 10 MG
10 TABLET ORAL EVERY EVENING
Qty: 90 TABLET | Refills: 1 | Status: SHIPPED | OUTPATIENT
Start: 2024-06-24

## 2024-06-24 RX ORDER — FAMOTIDINE 20 MG/1
20 TABLET, FILM COATED ORAL NIGHTLY
Qty: 90 TABLET | Refills: 1 | Status: SHIPPED | OUTPATIENT
Start: 2024-06-24

## 2024-06-24 NOTE — PROGRESS NOTES
The ABCs of the Annual Wellness Visit  Initial Medicare Wellness Visit    Chief Complaint   Patient presents with    Medicare Wellness-subsequent       Subjective   History of Present Illness:  Kamille Miles is a 89 y.o. female who presents for an Initial Medicare Wellness Visit.    History of Present Illness  The patient is here for annual wellness visit.    The patient reports no falls. She has been monitoring her blood pressure at home, which typically ranges from 120 to 140, with the highest recorded reading being 140. She denies experiencing lightheadedness or dizziness.  She takes low-dose lisinopril daily.    The patient is currently on simvastatin for cholesterol management.  She also eats a low-fat diet and tries to exercise regularly.    The patient is on a regimen of vitamin B12 at a dosage of 1000.  B12 level is now being good.    The patient is prescribed famotidine for reflux, which she takes daily.  She has decreased consumption of caffeinated beverages and wine, which she believes had exacerbated her acid reflux.    The patient reports frequent gas, for which she takes a fiber supplement mixed with tea in the morning. Her bowel movements are typically on the loose side.  Occasional difficulty with evacuating.    The patient has hemorrhoids, which are not causing her any discomfort. She has found relief with the use of Anusol cream.  She would like a refill so she has it if she needs it.    The patient reports nocturia, necessitating bathroom visits approximately three times per night.    The patient has been taking half a tablet of Ambien about once weekly to aid in sleep.        CHRONIC CONDITIONS:    HEALTH RISK ASSESSMENT    Recent Hospitalizations:  She was not admitted to the hospital during the last year.       Current Medical Providers:  Patient Care Team:  Brisa Lord MD as PCP - General  Brisa Lord MD as PCP - Family Medicine  Td Eli MD as Consulting Physician  (Urology)    Smoking Status:  Social History     Tobacco Use   Smoking Status Never    Passive exposure: Never   Smokeless Tobacco Never       Alcohol Consumption:  Social History     Substance and Sexual Activity   Alcohol Use Yes    Alcohol/week: 2.0 - 3.0 standard drinks of alcohol    Types: 2 - 3 Glasses of wine per week    Comment: 4-5 glasses per week of wine       Depression Screen:       2024     2:57 PM   PHQ-2/PHQ-9 Depression Screening   Little Interest or Pleasure in Doing Things 0-->not at all   Feeling Down, Depressed or Hopeless 0-->not at all   PHQ-9: Brief Depression Severity Measure Score 0       Fall Risk Screen:  HARLAN Fall Risk Assessment was completed, and patient is at LOW risk for falls.Assessment completed on:2024    Health Habits and Functional and Cognitive Screenin/17/2024     1:25 PM   Functional & Cognitive Status   Do you have difficulty preparing food and eating? No   Do you have difficulty bathing yourself, getting dressed or grooming yourself? No   Do you have difficulty using the toilet? No   Do you have difficulty moving around from place to place? No   Do you have trouble with steps or getting out of a bed or a chair? No   Current Diet Well Balanced Diet   Dental Exam Up to date   Eye Exam Up to date   Exercise (times per week) 3 times per week   Current Exercises Include Aerobics;House Cleaning;Light Weights;Walking;Yard Work   Do you need help using the phone?  No   Are you deaf or do you have serious difficulty hearing?  No   Do you need help to go to places out of walking distance? No   Do you need help shopping? No   Do you need help preparing meals?  No   Do you need help with housework?  No   Do you need help with laundry? No   Do you need help taking your medications? No   Do you need help managing money? No   Do you ever drive or ride in a car without wearing a seat belt? No   Have you felt unusual stress, anger or loneliness in the last month? No    Who do you live with? Spouse   If you need help, do you have trouble finding someone available to you? No   Have you been bothered in the last four weeks by sexual problems? No   Do you have difficulty concentrating, remembering or making decisions? No         Age-appropriate Screening Schedule:  Refer to the list below for future screening recommendations based on patient's age, sex and/or medical conditions. Orders for these recommended tests are listed in the plan section. The patient has been provided with a written plan.    Health Maintenance   Topic Date Due    COVID-19 Vaccine (10 - 2023-24 season) 11/22/2023    ANNUAL WELLNESS VISIT  06/19/2024    DXA SCAN  06/22/2024    INFLUENZA VACCINE  08/01/2024    LIPID PANEL  06/07/2025    TDAP/TD VACCINES (3 - Td or Tdap) 06/17/2032    RSV Vaccine - Adults  Completed    Pneumococcal Vaccine 65+  Completed    ZOSTER VACCINE  Completed        The following portions of the patient's history were reviewed and updated as appropriate: allergies, current medications, past family history, past medical history, past social history, past surgical history, and problem list.    Does the patient have evidence of cognitive impairment? No    Aspirin is on active medication list. Aspirin use is indicated based on review of current medical condition/s. Pros and cons of this therapy have been discussed today. Benefits of this medication outweigh potential harm.  Patient has been encouraged to continue taking this medication.  .      Outpatient Medications Prior to Visit   Medication Sig Dispense Refill    aspirin 81 MG EC tablet Take 1 tablet by mouth Daily.      Calcium 500-100 MG-UNIT chewable tablet Chew 1 tablet Every 4 (Four) Hours As Needed.      cyanocobalamin (VITAMIN B-12) 500 MCG tablet Take 1 tablet by mouth Daily. 90 tablet 1    fluticasone (FLONASE) 50 MCG/ACT nasal spray 1 spray into the nostril(s) as directed by provider 2 (Two) Times a Day. (Patient taking  differently: 1 spray into the nostril(s) as directed by provider 2 (Two) Times a Day. PRN) 16 g 5    hydrocortisone 1 % cream Apply  topically to the appropriate area as directed 2 (Two) Times a Day As Needed for Rash (itching). 1 each 0    Probiotic Product (TRUBIOTICS PO) Take 1 capsule by mouth Daily.      cholecalciferol (Vitamin D, Cholecalciferol,) 25 MCG (1000 UT) tablet Take 1 tablet by mouth Daily. 60 tablet 5    famotidine (PEPCID) 20 MG tablet TAKE 1 TABLET BY MOUTH EVERY NIGHT 90 tablet 0    Hydrocortisone, Perianal, (ANUSOL-HC) 2.5 % rectal cream INSERT RECTALLY TO THE AFFECTED AREA TWICE DAILY AS NEEDED FOR HEMORRHOIDS 28 g 0    lisinopril (PRINIVIL,ZESTRIL) 5 MG tablet TAKE 1 TABLET BY MOUTH DAILY 90 tablet 1    simvastatin (ZOCOR) 10 MG tablet TAKE 1 TABLET BY MOUTH EVERY EVENING 90 tablet 1    zolpidem (AMBIEN) 10 MG tablet Take 0.5 tablets by mouth At Night As Needed for Sleep. 30 tablet 1     Facility-Administered Medications Prior to Visit   Medication Dose Route Frequency Provider Last Rate Last Admin    aspirin chewable tablet 81 mg  81 mg Oral Once Sis Wasserman APRN           Patient Active Problem List   Diagnosis    Urinary frequency    Light-headed feeling    Mixed hyperlipidemia    Senile osteoporosis    Primary insomnia    Nocturia    Status post carpal tunnel release    Perennial allergic rhinitis with seasonal variation    Ankle edema, bilateral    External hemorrhoid    Osteoarthritis    Sinus headache    Benign essential hypertension    Other fatigue    Bilateral hearing loss due to cerumen impaction    Chronic pain of right knee    Numbness and tingling of both feet    Abnormal weight loss    Gastroesophageal reflux disease without esophagitis    History of TIA (transient ischemic attack)    COVID-19 virus infection    Gas pain    Irritable bowel syndrome with both constipation and diarrhea    Chronic kidney disease, stage 3a    Poor balance       Advanced Care  "Planning:  Advance Directive is not on file.  ACP discussion was held with the patient during this visit. Patient does not have an advance directive, information provided.    Review of Systems    Compared to one year ago, the patient feels her physical health is the same.  Compared to one year ago, the patient feels her mental health is the same.    Reviewed chart for potential of high risk medication in the elderly: yes  Reviewed chart for potential of harmful drug interactions in the elderly:yes    Objective         Vitals:    06/24/24 1457   BP: 124/66   BP Location: Left arm   Patient Position: Sitting   Cuff Size: Adult   Pulse: 72   Resp: 17   Temp: 98.2 °F (36.8 °C)   TempSrc: Infrared   SpO2: 97%   Weight: 54.4 kg (120 lb)   Height: 166 cm (65.35\")   PainSc:   2   PainLoc: Knee     BMI is within normal parameters. No other follow-up for BMI required.    Body mass index is 19.76 kg/m².  Discussed the patient's BMI with her. The BMI is in the acceptable range.    Physical Exam  Vitals and nursing note reviewed.   Constitutional:       Appearance: She is well-developed.   HENT:      Head: Normocephalic.   Eyes:      Conjunctiva/sclera: Conjunctivae normal.      Pupils: Pupils are equal, round, and reactive to light.   Neck:      Thyroid: No thyromegaly.   Cardiovascular:      Rate and Rhythm: Normal rate and regular rhythm.      Heart sounds: Normal heart sounds.   Pulmonary:      Effort: Pulmonary effort is normal.      Breath sounds: Normal breath sounds. No wheezing.   Chest:   Breasts:     Right: No inverted nipple, mass, nipple discharge, skin change or tenderness.      Left: No inverted nipple, mass, nipple discharge, skin change or tenderness.   Abdominal:      General: Bowel sounds are normal.      Palpations: Abdomen is soft.      Tenderness: There is no abdominal tenderness.   Musculoskeletal:         General: No tenderness. Normal range of motion.      Cervical back: Normal range of motion and neck " supple.      Right lower leg: No edema.      Left lower leg: No edema.   Lymphadenopathy:      Cervical: No cervical adenopathy.   Skin:     General: Skin is warm and dry.      Findings: No rash.   Neurological:      Mental Status: She is alert and oriented to person, place, and time.      Cranial Nerves: No cranial nerve deficit.      Sensory: No sensory deficit.      Coordination: Coordination normal.      Gait: Gait normal.   Psychiatric:         Attention and Perception: Attention normal.         Mood and Affect: Mood normal.         Speech: Speech normal.         Behavior: Behavior normal.         Thought Content: Thought content normal.         Judgment: Judgment normal.              Lab Results   Component Value Date    TRIG 52 06/07/2024    HDL 91 (H) 06/07/2024    LDL 81 06/07/2024    VLDL 10 06/07/2024        Assessment & Plan   Medicare Risks and Personalized Health Plan  CMS Preventative Services Quick Reference  Cardiovascular risk  Osteoporosis Risk    The above risks/problems have been discussed with the patient.  Pertinent information has been shared with the patient in the After Visit Summary.  Follow up plans and orders are seen below in the Assessment/Plan Section.  Patient Instructions   Problem List Items Addressed This Visit          Cardiac and Vasculature    Benign essential hypertension (Chronic)    Overview     Taking 5 mg lisinopril daily.           Relevant Medications    lisinopril (PRINIVIL,ZESTRIL) 5 MG tablet    Other Relevant Orders    CBC & Differential (Completed)    Comprehensive Metabolic Panel (Completed)    Microalbumin / Creatinine Urine Ratio - Urine, Clean Catch (Completed)    Urinalysis With Microscopic - Urine, Clean Catch (Completed)    Mixed hyperlipidemia    Overview     Taking simvastatin every evening.           Relevant Medications    simvastatin (ZOCOR) 10 MG tablet    Other Relevant Orders    Lipid Panel (Completed)    TSH (Completed)    Vitamin B12 (Completed)     "   Gastrointestinal Abdominal     Gastroesophageal reflux disease without esophagitis (Chronic)    Overview     EGD in [place] 2/9/2021 was normal.  Biopsies were normal.           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  T-scores were stable.    DEXA 6/2022 shows decline in T-scores.  However it is still in the osteopenia range.  Lumbar spine has declined to -2.3.  (Osteoporosis is -2.5 or less.)  Femoral neck has declined to -1.6.            Relevant Orders    Vitamin D,25-Hydroxy (Completed)       Sleep    Primary insomnia    Overview     Using low dose ambien as needed.              Relevant Medications    zolpidem (AMBIEN) 5 MG tablet       Symptoms and Signs    Poor balance     Other Visit Diagnoses       Medicare annual wellness visit, subsequent    -  Primary    Menopausal and postmenopausal disorder        Relevant Orders    DEXA Bone Density Axial          Objective   Vital Signs:  /66 (BP Location: Left arm, Patient Position: Sitting, Cuff Size: Adult)   Pulse 72   Temp 98.2 °F (36.8 °C) (Infrared)   Resp 17   Ht 166 cm (65.35\")   Wt 54.4 kg (120 lb)   SpO2 97%   BMI 19.76 kg/m²     The following data was reviewed by: Brisa Lord MD on 06/24/2024:  Common labs          6/7/2024    08:39   Common Labs   Glucose 90    BUN 18    Creatinine 1.06    Sodium 140    Potassium 4.1    Chloride 103    Calcium 9.7    Albumin 4.1    Total Bilirubin 0.5    Alkaline Phosphatase 67    AST (SGOT) 20    ALT (SGPT) 12    WBC 4.01    Hemoglobin 13.8    Hematocrit 42.3    Platelets 205    Total Cholesterol 182    Triglycerides 52    HDL " Cholesterol 91    LDL Cholesterol  81    Microalbumin, Urine 1.5      CMP          6/7/2024    08:39   CMP   Glucose 90    BUN 18    Creatinine 1.06    EGFR 50.9    Sodium 140    Potassium 4.1    Chloride 103    Calcium 9.7    Total Protein 7.1    Albumin 4.1    Globulin 3.0    Total Bilirubin 0.5    Alkaline Phosphatase 67    AST (SGOT) 20    ALT (SGPT) 12    Albumin/Globulin Ratio 1.4    BUN/Creatinine Ratio 17.0    Anion Gap 9.5      CBC          6/7/2024    08:39   CBC   WBC 4.01    RBC 4.50    Hemoglobin 13.8    Hematocrit 42.3    MCV 94.0    MCH 30.7    MCHC 32.6    RDW 12.4    Platelets 205      CBC w/diff          6/7/2024    08:39   CBC w/Diff   WBC 4.01    RBC 4.50    Hemoglobin 13.8    Hematocrit 42.3    MCV 94.0    MCH 30.7    MCHC 32.6    RDW 12.4    Platelets 205    Neutrophil Rel % 44.5    Immature Granulocyte Rel % 0.2    Lymphocyte Rel % 41.6    Monocyte Rel % 10.5    Eosinophil Rel % 2.2    Basophil Rel % 1.0      Lipid Panel          6/7/2024    08:39   Lipid Panel   Total Cholesterol 182    Triglycerides 52    HDL Cholesterol 91    VLDL Cholesterol 10    LDL Cholesterol  81    LDL/HDL Ratio 0.89      TSH          6/7/2024    08:39   TSH   TSH 1.450      Results  Laboratory Studies  Kidney function improved, filtration rate increased from 48 to 50.9. Liver enzymes were all good. LDL cholesterol was 81, good cholesterol is nice and high. Vitamin D is now up to 96.        Assessment and Plan   Diagnoses and all orders for this visit:    1. Medicare annual wellness visit, subsequent (Primary)    2. Benign essential hypertension  -     CBC & Differential; Future  -     Comprehensive Metabolic Panel; Future  -     Microalbumin / Creatinine Urine Ratio - Urine, Clean Catch; Future  -     Urinalysis With Microscopic - Urine, Clean Catch; Future  -     lisinopril (PRINIVIL,ZESTRIL) 5 MG tablet; Take 1 tablet by mouth Daily.  Dispense: 90 tablet; Refill: 1    3. Mixed hyperlipidemia  -     Lipid Panel;  Future  -     TSH; Future  -     Vitamin B12; Future  -     simvastatin (ZOCOR) 10 MG tablet; Take 1 tablet by mouth Every Evening.  Dispense: 90 tablet; Refill: 1    4. Gastroesophageal reflux disease without esophagitis    5. Irritable bowel syndrome with both constipation and diarrhea    6. Chronic kidney disease, stage 3a    7. Senile osteoporosis  -     Vitamin D,25-Hydroxy; Future    8. Poor balance    9. External hemorrhoid    10. Primary insomnia  -     zolpidem (AMBIEN) 5 MG tablet; Take 0.5 tablets by mouth At Night As Needed for Sleep.  Dispense: 30 tablet; Refill: 2    11. Menopausal and postmenopausal disorder  -     DEXA Bone Density Axial; Future    Other orders  -     cholecalciferol (Vitamin D, Cholecalciferol,) 25 MCG (1000 UT) tablet; Take 2 tablets by mouth Daily.  -     Hydrocortisone, Perianal, (ANUSOL-HC) 2.5 % rectal cream; Insert  into the rectum 2 (Two) Times a Day As Needed for Hemorrhoids.  Dispense: 28 g; Refill: 3  -     famotidine (PEPCID) 20 MG tablet; Take 1 tablet by mouth Every Night.  Dispense: 90 tablet; Refill: 1      Assessment & Plan  1. Benign essential hypertension.  The patient is advised to persist with the daily intake of lisinopril, abstain from salt, and maintain regular exercise.    2. Mixed hyperlipidemia.  The patient will persist with simvastatin nightly, adhere to a low-fat, healthy diet, and regular exercise.    3. GERD.  The patient should continue with famotidine nightly. Should the patient not experience any symptoms related to acid reflux, she may discontinue the medication and use it as needed. It is crucial to note that the famotidine may also alleviate the gas and bloating.    4. Irritable bowel syndrome with both constipation and diarrhea.  The patient experiences looser stools and occasional difficulty evacuating the stool. This condition is likely age-related and diet-related. The patient is advised to avoid recipious vegetables to mitigate gas and  bloating. Daily fiber intake and a probiotic are also beneficial.    5. Chronic kidney disease.  The patient will maintain a high fluid intake, aiming for at least 92 ounces per day, and avoid counting sodas and alcohol.    6. Osteoporosis.  The patient is advised to continue with weightbearing exercises, walking, foot classes, and using small hand weights, upper body weight machines, or resistance bands. She will also use small hand weights at home on days when she does not attend her class. The patient will continue with vitamin D3, with a reduction to 2000 units daily.    7. External hemorrhoid.  The patient will continue with hydrocortisone (Anusol HC) cream as needed. Daily fiber intake is also beneficial.    8. Insomnia.  The patient should continue practicing good sleep hygiene. She may continue with low-dose Ambien as needed, but should avoid taking it if at all possible. A 5 mg tablet will be sent to take half of that as needed.    9. Poor balance.  The patient is advised to continue her [employer] classes several days a week, maintain high fluid intake, and incorporate protein in her diet at least 3 times a day.    Follow-up  The patient is scheduled for an annual wellness visit in 1 year, as she spends the ware in [place].          Follow Up   Return in about 1 year (around 6/24/2025) for Medicare Wellness.  Patient was given instructions and counseling regarding her condition or for health maintenance advice. Please see specific information pulled into the AVS if appropriate.     Patient or patient representative verbalized consent for the use of Ambient Listening during the visit with  Brisa Lord MD for chart documentation. 6/24/2024  15:53 EDT

## 2024-06-24 NOTE — PATIENT INSTRUCTIONS
Patient Instructions  Problem List Items Addressed This Visit          Cardiac and Vasculature    Benign essential hypertension (Chronic)    Overview     Taking 5 mg lisinopril daily.           Relevant Medications    lisinopril (PRINIVIL,ZESTRIL) 5 MG tablet    Other Relevant Orders    CBC & Differential (Completed)    Comprehensive Metabolic Panel (Completed)    Microalbumin / Creatinine Urine Ratio - Urine, Clean Catch (Completed)    Urinalysis With Microscopic - Urine, Clean Catch (Completed)    Mixed hyperlipidemia    Overview     Taking simvastatin every evening.           Relevant Medications    simvastatin (ZOCOR) 10 MG tablet    Other Relevant Orders    Lipid Panel (Completed)    TSH (Completed)    Vitamin B12 (Completed)       Gastrointestinal Abdominal     Gastroesophageal reflux disease without esophagitis (Chronic)    Overview     EGD in [place] 2/9/2021 was normal.  Biopsies were normal.           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  T-scores were stable.    DEXA 6/2022 shows decline in T-scores.  However it is still in the osteopenia range.  Lumbar spine has declined to -2.3.  (Osteoporosis is -2.5 or less.)  Femoral neck has declined to -1.6.            Relevant Orders    Vitamin D,25-Hydroxy (Completed)       Sleep    Primary insomnia    Overview     Using low dose ambien as needed.              Relevant Medications    zolpidem (AMBIEN) 5 MG tablet       Symptoms and Signs    Poor balance     Other Visit Diagnoses       Medicare annual wellness visit, subsequent    -  Primary    Menopausal and postmenopausal disorder         Relevant Orders    DEXA Bone Density Axial           Exercising to Stay Healthy  To become healthy and stay healthy, it is recommended that you do moderate-intensity and vigorous-intensity exercise. You can tell that you are exercising at a moderate intensity if your heart starts beating faster and you start breathing faster but can still hold a conversation. You can tell that you are exercising at a vigorous intensity if you are breathing much harder and faster and cannot hold a conversation while exercising.  How can exercise benefit me?  Exercising regularly is important. It has many health benefits, such as:  Improving overall fitness, flexibility, and endurance.  Increasing bone density.  Helping with weight control.  Decreasing body fat.  Increasing muscle strength and endurance.  Reducing stress and tension, anxiety, depression, or anger.  Improving overall health.  What guidelines should I follow while exercising?  Before you start a new exercise program, talk with your health care provider.  Do not exercise so much that you hurt yourself, feel dizzy, or get very short of breath.  Wear comfortable clothes and wear shoes with good support.  Drink plenty of water while you exercise to prevent dehydration or heat stroke.  Work out until your breathing and your heartbeat get faster (moderate intensity).  How often should I exercise?  Choose an activity that you enjoy, and set realistic goals. Your health care provider can help you make an activity plan that is individually designed and works best for you.  Exercise regularly as told by your health care provider. This may include:  Doing strength training two times a week, such as:  Lifting weights.  Using resistance bands.  Push-ups.  Sit-ups.  Yoga.  Doing a certain intensity of exercise for a given amount of time. Choose from these options:  A total of 150 minutes of moderate-intensity exercise every week.  A total of 75 minutes of vigorous-intensity exercise  every week.  A mix of moderate-intensity and vigorous-intensity exercise every week.  Children, pregnant women, people who have not exercised regularly, people who are overweight, and older adults may need to talk with a health care provider about what activities are safe to perform. If you have a medical condition, be sure to talk with your health care provider before you start a new exercise program.  What are some exercise ideas?  Moderate-intensity exercise ideas include:  Walking 1 mile (1.6 km) in about 15 minutes.  Biking.  Hiking.  Golfing.  Dancing.  Water aerobics.  Vigorous-intensity exercise ideas include:  Walking 4.5 miles (7.2 km) or more in about 1 hour.  Jogging or running 5 miles (8 km) in about 1 hour.  Biking 10 miles (16.1 km) or more in about 1 hour.  Lap swimming.  Roller-skating or in-line skating.  Cross-country skiing.  Vigorous competitive sports, such as football, basketball, and soccer.  Jumping rope.  Aerobic dancing.  What are some everyday activities that can help me get exercise?  Yard work, such as:  Pushing a .  Raking and bagging leaves.  Washing your car.  Pushing a stroller.  Shoveling snow.  Gardening.  Washing windows or floors.  How can I be more active in my day-to-day activities?  Use stairs instead of an elevator.  Take a walk during your lunch break.  If you drive, park your car farther away from your work or school.  If you take public transportation, get off one stop early and walk the rest of the way.  Stand up or walk around during all of your indoor phone calls.  Get up, stretch, and walk around every 30 minutes throughout the day.  Enjoy exercise with a friend. Support to continue exercising will help you keep a regular routine of activity.  Where to find more information  You can find more information about exercising to stay healthy from:  U.S. Department of Health and Human Services: www.hhs.gov  Centers for Disease Control and Prevention (CDC):  www.cdc.gov  Summary  Exercising regularly is important. It will improve your overall fitness, flexibility, and endurance.  Regular exercise will also improve your overall health. It can help you control your weight, reduce stress, and improve your bone density.  Do not exercise so much that you hurt yourself, feel dizzy, or get very short of breath.  Before you start a new exercise program, talk with your health care provider.  This information is not intended to replace advice given to you by your health care provider. Make sure you discuss any questions you have with your health care provider.  Document Revised: 04/15/2022 Document Reviewed: 04/15/2022  Elsevier Patient Education © 2023 [employer]vier Inc.

## 2024-08-09 ENCOUNTER — OFFICE VISIT (OUTPATIENT)
Dept: INTERNAL MEDICINE | Facility: CLINIC | Age: 89
End: 2024-08-09
Payer: MEDICARE

## 2024-08-09 VITALS
BODY MASS INDEX: 19.86 KG/M2 | SYSTOLIC BLOOD PRESSURE: 135 MMHG | DIASTOLIC BLOOD PRESSURE: 64 MMHG | TEMPERATURE: 98.2 F | HEIGHT: 65 IN | OXYGEN SATURATION: 97 % | HEART RATE: 78 BPM | WEIGHT: 119.2 LBS

## 2024-08-09 DIAGNOSIS — K64.4 EXTERNAL HEMORRHOID: ICD-10-CM

## 2024-08-09 DIAGNOSIS — I10 BENIGN ESSENTIAL HYPERTENSION: Chronic | ICD-10-CM

## 2024-08-09 DIAGNOSIS — K58.1 IRRITABLE BOWEL SYNDROME WITH CONSTIPATION: Primary | Chronic | ICD-10-CM

## 2024-08-09 PROCEDURE — 1160F RVW MEDS BY RX/DR IN RCRD: CPT | Performed by: INTERNAL MEDICINE

## 2024-08-09 PROCEDURE — 1125F AMNT PAIN NOTED PAIN PRSNT: CPT | Performed by: INTERNAL MEDICINE

## 2024-08-09 PROCEDURE — 99213 OFFICE O/P EST LOW 20 MIN: CPT | Performed by: INTERNAL MEDICINE

## 2024-08-09 PROCEDURE — 1159F MED LIST DOCD IN RCRD: CPT | Performed by: INTERNAL MEDICINE

## 2024-08-09 RX ORDER — POLYETHYLENE GLYCOL 3350 17 G/17G
17 POWDER, FOR SOLUTION ORAL DAILY
Start: 2024-08-09

## 2024-08-09 NOTE — PROGRESS NOTES
[facility]     Kamille Miles  1935   [id number]      Patient Care Team:  Brisa Lord MD as PCP - General  Brisa Lord MD as PCP - Family Medicine  Td Eli MD as Consulting Physician (Urology)    Chief Complaint   Patient presents with    Hemorrhoids     She said it feels like it has gotten bigger. Not bleeding or bothering her. She just wants to make sure that it is hemorrhoids and not something else.        Patient is a 89 y.o. female.   History of Present Illness  The patient presents for evaluation of hemorrhoids.    She suspects the presence of hemorrhoids, which have increased in size. The hemorrhoids are not bleeding, itching, or causing discomfort, but she expresses concern due to their increasing in size. She occasionally feels a bump in the rectum, which she manages with a prescribed cream. She denies excessive tissue expulsion or rectal prolapse.    As she ages, she reports a decline in her digestive system, leading to occasional straining during bowel movements. She supplements her diet with fiber powder at least once daily, incorporates apples, and water. She is also taking a probiotic and has MiraLAX at home. Her appetite has decreased.         CHRONIC CONDITIONS      Past Medical History:   Diagnosis Date    Arthritis 2020    Cataract 2021    GERD (gastroesophageal reflux disease) 2022    Head injury     Headache     cervicogenic -work-up by neurologist in [place], [place]    HL (hearing loss) 2023    Hyperlipidemia 2000    Hypertension     Osteopenia 2000    Stroke 8/10/22    TIA (transient ischemic attack) 08/11/2022    Admission [facility] 8/2022. Increase ASA to 325 mg. Cont simvastatin 10 mg       Past Surgical History:   Procedure Laterality Date    CARPAL TUNNEL RELEASE  2019?    COLONOSCOPY  2010?       Family History   Problem Relation Age of Onset    Arrhythmia Mother         Pacemaker placed    Stroke Mother         Age 91    Lymphoma Father     Breast  "cancer Neg Hx     Ovarian cancer Neg Hx        Social History     Socioeconomic History    Marital status:    Tobacco Use    Smoking status: Never     Passive exposure: Never    Smokeless tobacco: Never   Vaping Use    Vaping status: Never Used   Substance and Sexual Activity    Alcohol use: Yes     Alcohol/week: 2.0 - 3.0 standard drinks of alcohol     Types: 2 - 3 Glasses of wine per week     Comment: 4-5 glasses per week of wine    Drug use: Never    Sexual activity: Not Currently     Partners: Male       No Known Allergies    Review of Systems:     Review of Systems    Vital Signs  Vitals:    08/09/24 0858   BP: 135/64   BP Location: Left arm   Patient Position: Sitting   Cuff Size: Adult   Pulse: 78   Temp: 98.2 °F (36.8 °C)   SpO2: 97%   Weight: 54.1 kg (119 lb 3.2 oz)   Height: 166 cm (65.35\")     Body mass index is 19.62 kg/m².  BMI is within normal parameters. No other follow-up for BMI required.          Current Outpatient Medications:     aspirin 81 MG EC tablet, Take 1 tablet by mouth Daily., Disp: , Rfl:     Calcium 500-100 MG-UNIT chewable tablet, Chew 1 tablet Every 4 (Four) Hours As Needed., Disp: , Rfl:     cholecalciferol (Vitamin D, Cholecalciferol,) 25 MCG (1000 UT) tablet, Take 2 tablets by mouth Daily., Disp: , Rfl:     cyanocobalamin (VITAMIN B-12) 500 MCG tablet, Take 1 tablet by mouth Daily., Disp: 90 tablet, Rfl: 1    famotidine (PEPCID) 20 MG tablet, Take 1 tablet by mouth Every Night., Disp: 90 tablet, Rfl: 1    fluticasone (FLONASE) 50 MCG/ACT nasal spray, 1 spray into the nostril(s) as directed by provider 2 (Two) Times a Day. (Patient taking differently: 1 spray into the nostril(s) as directed by provider 2 (Two) Times a Day. PRN), Disp: 16 g, Rfl: 5    hydrocortisone 1 % cream, Apply  topically to the appropriate area as directed 2 (Two) Times a Day As Needed for Rash (itching)., Disp: 1 each, Rfl: 0    Hydrocortisone, Perianal, (ANUSOL-HC) 2.5 % rectal cream, Insert  into the " rectum 2 (Two) Times a Day As Needed for Hemorrhoids., Disp: 28 g, Rfl: 3    lisinopril (PRINIVIL,ZESTRIL) 5 MG tablet, Take 1 tablet by mouth Daily., Disp: 90 tablet, Rfl: 1    Probiotic Product (TRUBIOTICS PO), Take 1 capsule by mouth Daily., Disp: , Rfl:     simvastatin (ZOCOR) 10 MG tablet, Take 1 tablet by mouth Every Evening., Disp: 90 tablet, Rfl: 1    zolpidem (AMBIEN) 5 MG tablet, Take 0.5 tablets by mouth At Night As Needed for Sleep., Disp: 30 tablet, Rfl: 2    polyethylene glycol (SB Polyethylene Glycol 3350) 17 GM/SCOOP powder, Take 17 g by mouth Daily., Disp: , Rfl:     Physical Exam:    Physical Exam  Vitals and nursing note reviewed.   Constitutional:       Appearance: She is well-developed.   HENT:      Head: Normocephalic.   Eyes:      Conjunctiva/sclera: Conjunctivae normal.      Pupils: Pupils are equal, round, and reactive to light.   Neck:      Thyroid: No thyromegaly.   Cardiovascular:      Rate and Rhythm: Normal rate and regular rhythm.      Heart sounds: Normal heart sounds.   Pulmonary:      Effort: Pulmonary effort is normal.      Breath sounds: Normal breath sounds. No wheezing.   Genitourinary:         Comments: 2 tiny noninflamed hemorrhoids nonengorged external.  No internal hemorrhoids appreciated.  Mild amount of redness right at the rectum  Musculoskeletal:         General: Normal range of motion.      Cervical back: Normal range of motion and neck supple.   Lymphadenopathy:      Cervical: No cervical adenopathy.   Skin:     General: Skin is warm and dry.   Neurological:      Mental Status: She is alert and oriented to person, place, and time.   Psychiatric:         Thought Content: Thought content normal.          ACE III MINI        Results Review:    I reviewed the patient's new clinical results.  Results         CMP:  Lab Results   Component Value Date    BUN 18 06/07/2024    CREATININE 1.06 (H) 06/07/2024    EGFRIFNONA 61 06/01/2021    BCR 17.0 06/07/2024     06/07/2024     K 4.1 06/07/2024    CO2 27.5 06/07/2024    CALCIUM 9.7 06/07/2024    ALBUMIN 4.1 06/07/2024    BILITOT 0.5 06/07/2024    ALKPHOS 67 06/07/2024    AST 20 06/07/2024    ALT 12 06/07/2024     HbA1c:  Lab Results   Component Value Date    HGBA1C 5.50 08/12/2022     Microalbumin:  Lab Results   Component Value Date    MICROALBUR 1.5 06/07/2024     Lipid Panel  Lab Results   Component Value Date    CHOL 182 06/07/2024    TRIG 52 06/07/2024    HDL 91 (H) 06/07/2024    LDL 81 06/07/2024    AST 20 06/07/2024    ALT 12 06/07/2024       Medication Review: Medications reviewed and noted  Patient Instructions   Problem List Items Addressed This Visit          Cardiac and Vasculature    Benign essential hypertension (Chronic)    Overview     Taking 5 mg lisinopril daily.           Relevant Medications    lisinopril (PRINIVIL,ZESTRIL) 5 MG tablet       Gastrointestinal Abdominal     Irritable bowel syndrome with constipation - Primary (Chronic)    External hemorrhoid          Diagnosis Plan   1. Irritable bowel syndrome with constipation        2. External hemorrhoid        3. Benign essential hypertension          Assessment & Plan  1. External hemorrhoid.  She has some hemorrhoid cream, hydrocortisone cream to use as needed if they bother her. She was reassured that they are not large and not inflamed or engorged. As long as she is not having symptoms like itching, pain, or bleeding, she does not need to worry. I do want her to soften her bowels though to help keep from irritating the hemorrhoids.    2. Irritable bowel syndrome with constipation.  I advised her to put 1 to 2 tablespoons of MiraLAX in with her fiber every morning to soften the stools and keep from irritating the hemorrhoids. Continue taking probiotic daily and drinking lots of fluids.    3. Benign essential hypertension.  She will continue low dose lisinopril daily.    Follow-up  She will come back to see me in 06/2024 for annual wellness visit.         Plan  of care reviewed with patient at the conclusion of today's visit. Education was provided regarding diagnosis, management, and any prescribed or recommended OTC medications. Patient verbalizes understanding of and agreement with management plan.         08/09/24   09:02 EDT    Patient or patient representative verbalized consent for the use of Ambient Listening during the visit with  Brisa Lord MD for chart documentation. 8/9/2024  09:46 EDT

## 2024-08-09 NOTE — PATIENT INSTRUCTIONS
Patient Instructions  Problem List Items Addressed This Visit          Cardiac and Vasculature    Benign essential hypertension (Chronic)    Overview     Taking 5 mg lisinopril daily.           Relevant Medications    lisinopril (PRINIVIL,ZESTRIL) 5 MG tablet       Gastrointestinal Abdominal     Irritable bowel syndrome with constipation - Primary (Chronic)    External hemorrhoid       Exercising to Stay Healthy  To become healthy and stay healthy, it is recommended that you do moderate-intensity and vigorous-intensity exercise. You can tell that you are exercising at a moderate intensity if your heart starts beating faster and you start breathing faster but can still hold a conversation. You can tell that you are exercising at a vigorous intensity if you are breathing much harder and faster and cannot hold a conversation while exercising.  How can exercise benefit me?  Exercising regularly is important. It has many health benefits, such as:  Improving overall fitness, flexibility, and endurance.  Increasing bone density.  Helping with weight control.  Decreasing body fat.  Increasing muscle strength and endurance.  Reducing stress and tension, anxiety, depression, or anger.  Improving overall health.  What guidelines should I follow while exercising?  Before you start a new exercise program, talk with your health care provider.  Do not exercise so much that you hurt yourself, feel dizzy, or get very short of breath.  Wear comfortable clothes and wear shoes with good support.  Drink plenty of water while you exercise to prevent dehydration or heat stroke.  Work out until your breathing and your heartbeat get faster (moderate intensity).  How often should I exercise?  Choose an activity that you enjoy, and set realistic goals. Your health care provider can help you make an activity plan that is individually designed and works best for you.  Exercise regularly as told by your health care provider. This may  include:  Doing strength training two times a week, such as:  Lifting weights.  Using resistance bands.  Push-ups.  Sit-ups.  Yoga.  Doing a certain intensity of exercise for a given amount of time. Choose from these options:  A total of 150 minutes of moderate-intensity exercise every week.  A total of 75 minutes of vigorous-intensity exercise every week.  A mix of moderate-intensity and vigorous-intensity exercise every week.  Children, pregnant women, people who have not exercised regularly, people who are overweight, and older adults may need to talk with a health care provider about what activities are safe to perform. If you have a medical condition, be sure to talk with your health care provider before you start a new exercise program.  What are some exercise ideas?  Moderate-intensity exercise ideas include:  Walking 1 mile (1.6 km) in about 15 minutes.  Biking.  Hiking.  Golfing.  Dancing.  Water aerobics.  Vigorous-intensity exercise ideas include:  Walking 4.5 miles (7.2 km) or more in about 1 hour.  Jogging or running 5 miles (8 km) in about 1 hour.  Biking 10 miles (16.1 km) or more in about 1 hour.  Lap swimming.  Roller-skating or in-line skating.  Cross-country skiing.  Vigorous competitive sports, such as football, basketball, and soccer.  Jumping rope.  Aerobic dancing.  What are some everyday activities that can help me get exercise?  Yard work, such as:  Pushing a .  Raking and bagging leaves.  Washing your car.  Pushing a stroller.  Shoveling snow.  Gardening.  Washing windows or floors.  How can I be more active in my day-to-day activities?  Use stairs instead of an elevator.  Take a walk during your lunch break.  If you drive, park your car farther away from your work or school.  If you take public transportation, get off one stop early and walk the rest of the way.  Stand up or walk around during all of your indoor phone calls.  Get up, stretch, and walk around every 30 minutes  throughout the day.  Enjoy exercise with a friend. Support to continue exercising will help you keep a regular routine of activity.  Where to find more information  You can find more information about exercising to stay healthy from:  U.S. Department of Health and Human Services: www.hhs.gov  Centers for Disease Control and Prevention (CDC): www.cdc.gov  Summary  Exercising regularly is important. It will improve your overall fitness, flexibility, and endurance.  Regular exercise will also improve your overall health. It can help you control your weight, reduce stress, and improve your bone density.  Do not exercise so much that you hurt yourself, feel dizzy, or get very short of breath.  Before you start a new exercise program, talk with your health care provider.  This information is not intended to replace advice given to you by your health care provider. Make sure you discuss any questions you have with your health care provider.  Document Revised: 04/15/2022 Document Reviewed: 04/15/2022  Elsevier Patient Education © 2023 Elsevier Inc.

## 2024-08-19 ENCOUNTER — HOSPITAL ENCOUNTER (OUTPATIENT)
Dept: BONE DENSITY | Facility: HOSPITAL | Age: 89
Discharge: HOME | End: 2024-08-19
Admitting: INTERNAL MEDICINE
Payer: MEDICARE

## 2024-08-19 DIAGNOSIS — N95.9 MENOPAUSAL AND POSTMENOPAUSAL DISORDER: ICD-10-CM

## 2024-08-19 PROCEDURE — 77080 DXA BONE DENSITY AXIAL: CPT

## 2024-08-21 ENCOUNTER — TELEPHONE (OUTPATIENT)
Dept: INTERNAL MEDICINE | Facility: CLINIC | Age: 89
End: 2024-08-21
Payer: MEDICARE

## 2024-08-21 DIAGNOSIS — M81.0 SENILE OSTEOPOROSIS: Primary | ICD-10-CM

## 2024-08-21 DIAGNOSIS — M81.0 SENILE OSTEOPOROSIS: ICD-10-CM

## 2024-08-21 RX ORDER — ALENDRONATE SODIUM 70 MG/1
70 TABLET ORAL
Qty: 15 TABLET | Refills: 1 | Status: SHIPPED | OUTPATIENT
Start: 2024-08-21 | End: 2024-08-21

## 2024-08-21 NOTE — TELEPHONE ENCOUNTER
She is not sure about taking Fosamax. She has heard really bad stories about taking it and is going to investigate. She asked if she could increase her D3 and Calcium instead of taking the Fosamax.      ----- Message from Brisa Lord sent at 8/21/2024 10:03 AM EDT -----  Please call patient if this has not been read by this afternoon.      8/2024 DEXA shows osteoporosis with the lowest T-score in the lumbar spine at -2.6.  The lowest T-score in the femoral neck is -1.8.  There has been worsening in the bone density since 6/2022 DEXA when the lowest T-score in the lumbar spine was -2.3.    I am sending Fosamax (alendronate) tablet to your pharmacy.  I recommend taking all once a week first thing in the morning with a glass of water.  Then wait 30 minutes before taking other medications or other beverages or food.    Let us know if you have any side effects such as heartburn or indigestion.  Let us know if you have any oral surgery planned.    Continue taking vitamin D3 and calcium.  Continue regular weightbearing exercises with walking and using small hand weights at home and classes such as Pilates and kaylynn chi.

## 2024-08-22 ENCOUNTER — TELEPHONE (OUTPATIENT)
Dept: INTERNAL MEDICINE | Facility: CLINIC | Age: 89
End: 2024-08-22
Payer: MEDICARE

## 2024-08-22 NOTE — TELEPHONE ENCOUNTER
Pt called me back and was confused because the Prolia was sent to pharmacy by accident. I explained everything to her again and she is understanding.    Sheron ZACARIAS

## 2024-08-22 NOTE — TELEPHONE ENCOUNTER
Spoke to patient today and she understood what she needs to take and that the Prolia was sent to pharmacy in error.

## 2024-08-26 ENCOUNTER — TELEPHONE (OUTPATIENT)
Dept: INTERNAL MEDICINE | Facility: CLINIC | Age: 89
End: 2024-08-26
Payer: MEDICARE

## 2024-08-26 NOTE — TELEPHONE ENCOUNTER
Patient was confused because no one from the infusion clinic has called her. I gave her the number and she will try to make the appt because she is going out of town soon.

## 2024-08-26 NOTE — TELEPHONE ENCOUNTER
Her insurance denied Prolia, the ones that don't need prior auth is  Alendronate Sodium  Ibandronate Sodium  Risedronate Sodium    Do you want her to try any of these or try to get a PA and see if they will approve it.

## 2024-08-27 DIAGNOSIS — M81.0 SENILE OSTEOPOROSIS: ICD-10-CM

## 2024-09-04 ENCOUNTER — HOSPITAL ENCOUNTER (OUTPATIENT)
Dept: ONCOLOGY | Facility: HOSPITAL | Age: 89
Discharge: HOME | End: 2024-09-04
Admitting: INTERNAL MEDICINE
Payer: MEDICARE

## 2024-09-04 VITALS
TEMPERATURE: 97.8 F | BODY MASS INDEX: 20.49 KG/M2 | RESPIRATION RATE: 16 BRPM | SYSTOLIC BLOOD PRESSURE: 150 MMHG | DIASTOLIC BLOOD PRESSURE: 66 MMHG | WEIGHT: 123 LBS | HEIGHT: 65 IN | HEART RATE: 75 BPM

## 2024-09-04 DIAGNOSIS — M81.0 SENILE OSTEOPOROSIS: Primary | ICD-10-CM

## 2024-09-04 PROCEDURE — 96372 THER/PROPH/DIAG INJ SC/IM: CPT

## 2024-09-04 PROCEDURE — 25010000002 DENOSUMAB 60 MG/ML SOLUTION PREFILLED SYRINGE: Performed by: INTERNAL MEDICINE

## 2024-09-04 RX ADMIN — DENOSUMAB 60 MG: 60 INJECTION SUBCUTANEOUS at 14:39

## 2024-09-23 DIAGNOSIS — F51.01 PRIMARY INSOMNIA: ICD-10-CM

## 2024-09-23 RX ORDER — ZOLPIDEM TARTRATE 5 MG/1
2.5 TABLET ORAL NIGHTLY PRN
Qty: 15 TABLET | Refills: 0 | Status: SHIPPED | OUTPATIENT
Start: 2024-09-23

## 2024-11-17 DIAGNOSIS — I10 BENIGN ESSENTIAL HYPERTENSION: Chronic | ICD-10-CM

## 2024-11-18 RX ORDER — LISINOPRIL 5 MG/1
5 TABLET ORAL DAILY
Qty: 90 TABLET | Refills: 1 | Status: SHIPPED | OUTPATIENT
Start: 2024-11-18

## 2024-12-08 NOTE — CONSULTS
Diabetes Education    Patient Name:  Kamille Miles  YOB: 1935  MRN: [id number]  Admit Date:  8/11/2022      Order criteria not met for diabetes education consult. Current A1c is 5.5, noted during chart review. Pt has no history of DM and no home meds for DM. Thank you.      Electronically signed by:  Loida Choudhary RN  08/12/22 09:17 EDT   0 (no pain/absence of nonverbal indicators of pain)

## 2025-02-19 DIAGNOSIS — E78.2 MIXED HYPERLIPIDEMIA: ICD-10-CM

## 2025-02-19 RX ORDER — SIMVASTATIN 10 MG
10 TABLET ORAL EVERY EVENING
Qty: 90 TABLET | Refills: 1 | Status: SHIPPED | OUTPATIENT
Start: 2025-02-19

## 2025-03-07 RX ORDER — FAMOTIDINE 20 MG/1
20 TABLET, FILM COATED ORAL NIGHTLY
Qty: 90 TABLET | Refills: 1 | Status: SHIPPED | OUTPATIENT
Start: 2025-03-07

## 2025-04-24 DIAGNOSIS — I10 BENIGN ESSENTIAL HYPERTENSION: Chronic | ICD-10-CM

## 2025-04-24 RX ORDER — LISINOPRIL 5 MG/1
5 TABLET ORAL DAILY
Qty: 90 TABLET | Refills: 1 | Status: SHIPPED | OUTPATIENT
Start: 2025-04-24

## 2025-05-16 ENCOUNTER — LAB (OUTPATIENT)
Dept: LAB | Facility: HOSPITAL | Age: 89
End: 2025-05-16
Payer: MEDICARE

## 2025-05-16 DIAGNOSIS — I10 BENIGN ESSENTIAL HYPERTENSION: ICD-10-CM

## 2025-05-16 DIAGNOSIS — R63.4 ABNORMAL WEIGHT LOSS: Chronic | ICD-10-CM

## 2025-05-16 DIAGNOSIS — E55.9 VITAMIN D DEFICIENCY: ICD-10-CM

## 2025-05-16 DIAGNOSIS — E78.2 MIXED HYPERLIPIDEMIA: ICD-10-CM

## 2025-05-16 LAB
25(OH)D3 SERPL-MCNC: 71.3 NG/ML (ref 30–100)
ALBUMIN SERPL-MCNC: 4.4 G/DL (ref 3.5–5.2)
ALBUMIN UR-MCNC: <1.2 MG/DL
ALBUMIN/GLOB SERPL: 1.3 G/DL
ALP SERPL-CCNC: 53 U/L (ref 39–117)
ALT SERPL W P-5'-P-CCNC: 14 U/L (ref 1–33)
ANION GAP SERPL CALCULATED.3IONS-SCNC: 10.6 MMOL/L (ref 5–15)
AST SERPL-CCNC: 21 U/L (ref 1–32)
BASOPHILS # BLD AUTO: 0.02 10*3/MM3 (ref 0–0.2)
BASOPHILS NFR BLD AUTO: 0.4 % (ref 0–1.5)
BILIRUB SERPL-MCNC: 0.7 MG/DL (ref 0–1.2)
BILIRUB UR QL STRIP: NEGATIVE
BUN SERPL-MCNC: 20 MG/DL (ref 8–23)
BUN/CREAT SERPL: 15.7 (ref 7–25)
CALCIUM SPEC-SCNC: 10 MG/DL (ref 8.6–10.5)
CHLORIDE SERPL-SCNC: 102 MMOL/L (ref 98–107)
CHOLEST SERPL-MCNC: 197 MG/DL (ref 0–200)
CLARITY UR: CLEAR
CO2 SERPL-SCNC: 28.4 MMOL/L (ref 22–29)
COLOR UR: YELLOW
CREAT SERPL-MCNC: 1.27 MG/DL (ref 0.57–1)
CREAT UR-MCNC: 162.4 MG/DL
DEPRECATED RDW RBC AUTO: 44.4 FL (ref 37–54)
EGFRCR SERPLBLD CKD-EPI 2021: 41 ML/MIN/1.73
EOSINOPHIL # BLD AUTO: 0.09 10*3/MM3 (ref 0–0.4)
EOSINOPHIL NFR BLD AUTO: 1.6 % (ref 0.3–6.2)
ERYTHROCYTE [DISTWIDTH] IN BLOOD BY AUTOMATED COUNT: 12.4 % (ref 12.3–15.4)
GLOBULIN UR ELPH-MCNC: 3.3 GM/DL
GLUCOSE SERPL-MCNC: 99 MG/DL (ref 65–99)
GLUCOSE UR STRIP-MCNC: NEGATIVE MG/DL
HCT VFR BLD AUTO: 44.4 % (ref 34–46.6)
HCYS SERPL-MCNC: 14.2 UMOL/L (ref 0–15)
HDLC SERPL-MCNC: 95 MG/DL (ref 40–60)
HGB BLD-MCNC: 14.8 G/DL (ref 12–15.9)
HGB UR QL STRIP.AUTO: NEGATIVE
IMM GRANULOCYTES # BLD AUTO: 0.01 10*3/MM3 (ref 0–0.05)
IMM GRANULOCYTES NFR BLD AUTO: 0.2 % (ref 0–0.5)
KETONES UR QL STRIP: NEGATIVE
LDLC SERPL CALC-MCNC: 89 MG/DL (ref 0–100)
LDLC/HDLC SERPL: 0.92 {RATIO}
LEUKOCYTE ESTERASE UR QL STRIP.AUTO: NEGATIVE
LYMPHOCYTES # BLD AUTO: 2.31 10*3/MM3 (ref 0.7–3.1)
LYMPHOCYTES NFR BLD AUTO: 41.8 % (ref 19.6–45.3)
MCH RBC QN AUTO: 31.8 PG (ref 26.6–33)
MCHC RBC AUTO-ENTMCNC: 33.3 G/DL (ref 31.5–35.7)
MCV RBC AUTO: 95.3 FL (ref 79–97)
MICROALBUMIN/CREAT UR: NORMAL MG/G{CREAT}
MONOCYTES # BLD AUTO: 0.53 10*3/MM3 (ref 0.1–0.9)
MONOCYTES NFR BLD AUTO: 9.6 % (ref 5–12)
NEUTROPHILS NFR BLD AUTO: 2.56 10*3/MM3 (ref 1.7–7)
NEUTROPHILS NFR BLD AUTO: 46.4 % (ref 42.7–76)
NITRITE UR QL STRIP: NEGATIVE
NRBC BLD AUTO-RTO: 0 /100 WBC (ref 0–0.2)
PH UR STRIP.AUTO: 6.5 [PH] (ref 5–8)
PLATELET # BLD AUTO: 221 10*3/MM3 (ref 140–450)
PMV BLD AUTO: 11.4 FL (ref 6–12)
POTASSIUM SERPL-SCNC: 4.2 MMOL/L (ref 3.5–5.2)
PROT SERPL-MCNC: 7.7 G/DL (ref 6–8.5)
PROT UR QL STRIP: NEGATIVE
RBC # BLD AUTO: 4.66 10*6/MM3 (ref 3.77–5.28)
SODIUM SERPL-SCNC: 141 MMOL/L (ref 136–145)
SP GR UR STRIP: 1.02 (ref 1–1.03)
TRIGL SERPL-MCNC: 73 MG/DL (ref 0–150)
TSH SERPL DL<=0.05 MIU/L-ACNC: 1.63 UIU/ML (ref 0.27–4.2)
UROBILINOGEN UR QL STRIP: NORMAL
VIT B12 BLD-MCNC: 784 PG/ML (ref 211–946)
VLDLC SERPL-MCNC: 13 MG/DL (ref 5–40)
WBC NRBC COR # BLD AUTO: 5.52 10*3/MM3 (ref 3.4–10.8)

## 2025-05-16 PROCEDURE — 82570 ASSAY OF URINE CREATININE: CPT

## 2025-05-16 PROCEDURE — 82607 VITAMIN B-12: CPT

## 2025-05-16 PROCEDURE — 80053 COMPREHEN METABOLIC PANEL: CPT

## 2025-05-16 PROCEDURE — 81001 URINALYSIS AUTO W/SCOPE: CPT

## 2025-05-16 PROCEDURE — 80061 LIPID PANEL: CPT

## 2025-05-16 PROCEDURE — 82043 UR ALBUMIN QUANTITATIVE: CPT

## 2025-05-16 PROCEDURE — 85025 COMPLETE CBC W/AUTO DIFF WBC: CPT

## 2025-05-16 PROCEDURE — 82306 VITAMIN D 25 HYDROXY: CPT

## 2025-05-16 PROCEDURE — 84443 ASSAY THYROID STIM HORMONE: CPT

## 2025-05-16 PROCEDURE — 83090 ASSAY OF HOMOCYSTEINE: CPT

## 2025-05-17 LAB
BACTERIA UR QL AUTO: NORMAL /HPF
HYALINE CASTS UR QL AUTO: NORMAL /LPF
RBC # UR STRIP: NORMAL /HPF
REF LAB TEST METHOD: NORMAL
SQUAMOUS #/AREA URNS HPF: NORMAL /HPF
WBC # UR STRIP: NORMAL /HPF

## 2025-05-20 DIAGNOSIS — I10 BENIGN ESSENTIAL HYPERTENSION: Chronic | ICD-10-CM

## 2025-05-21 RX ORDER — LISINOPRIL 5 MG/1
5 TABLET ORAL DAILY
Qty: 90 TABLET | Refills: 1 | Status: SHIPPED | OUTPATIENT
Start: 2025-05-21

## 2025-06-26 ENCOUNTER — OFFICE VISIT (OUTPATIENT)
Dept: INTERNAL MEDICINE | Facility: CLINIC | Age: 89
End: 2025-06-26
Payer: MEDICARE

## 2025-06-26 VITALS
DIASTOLIC BLOOD PRESSURE: 80 MMHG | HEART RATE: 72 BPM | TEMPERATURE: 98.4 F | BODY MASS INDEX: 19.82 KG/M2 | WEIGHT: 120.4 LBS | OXYGEN SATURATION: 100 % | SYSTOLIC BLOOD PRESSURE: 152 MMHG

## 2025-06-26 DIAGNOSIS — M25.472 ANKLE EDEMA, BILATERAL: ICD-10-CM

## 2025-06-26 DIAGNOSIS — R14.1 GAS PAIN: ICD-10-CM

## 2025-06-26 DIAGNOSIS — E55.9 VITAMIN D DEFICIENCY: ICD-10-CM

## 2025-06-26 DIAGNOSIS — K21.9 GASTROESOPHAGEAL REFLUX DISEASE WITHOUT ESOPHAGITIS: Chronic | ICD-10-CM

## 2025-06-26 DIAGNOSIS — R63.4 ABNORMAL WEIGHT LOSS: Chronic | ICD-10-CM

## 2025-06-26 DIAGNOSIS — N18.31 CHRONIC KIDNEY DISEASE, STAGE 3A: ICD-10-CM

## 2025-06-26 DIAGNOSIS — I10 BENIGN ESSENTIAL HYPERTENSION: Chronic | ICD-10-CM

## 2025-06-26 DIAGNOSIS — Z00.00 MEDICARE ANNUAL WELLNESS VISIT, SUBSEQUENT: Primary | ICD-10-CM

## 2025-06-26 DIAGNOSIS — R35.1 NOCTURIA: ICD-10-CM

## 2025-06-26 DIAGNOSIS — M25.471 ANKLE EDEMA, BILATERAL: ICD-10-CM

## 2025-06-26 DIAGNOSIS — R35.0 URINARY FREQUENCY: ICD-10-CM

## 2025-06-26 DIAGNOSIS — M81.0 SENILE OSTEOPOROSIS: ICD-10-CM

## 2025-06-26 DIAGNOSIS — E78.2 MIXED HYPERLIPIDEMIA: ICD-10-CM

## 2025-06-26 RX ORDER — SIMVASTATIN 10 MG
10 TABLET ORAL EVERY EVENING
Qty: 90 TABLET | Refills: 1 | Status: SHIPPED | OUTPATIENT
Start: 2025-06-26

## 2025-06-26 RX ORDER — LISINOPRIL 5 MG/1
5 TABLET ORAL DAILY
Qty: 90 TABLET | Refills: 1 | Status: SHIPPED | OUTPATIENT
Start: 2025-06-26

## 2025-06-26 RX ORDER — ESTRADIOL 0.1 MG/G
CREAM VAGINAL
COMMUNITY

## 2025-06-26 RX ORDER — FAMOTIDINE 20 MG/1
20 TABLET, FILM COATED ORAL NIGHTLY
Qty: 90 TABLET | Refills: 3 | Status: SHIPPED | OUTPATIENT
Start: 2025-06-26

## 2025-06-26 RX ORDER — BENZOCAINE/MENTHOL 6 MG-10 MG
LOZENGE MUCOUS MEMBRANE 2 TIMES DAILY PRN
Qty: 1 EACH | Refills: 0 | Status: SHIPPED | OUTPATIENT
Start: 2025-06-26

## 2025-06-26 RX ORDER — AZELASTINE HYDROCHLORIDE, FLUTICASONE PROPIONATE 137; 50 UG/1; UG/1
SPRAY, METERED NASAL
COMMUNITY

## 2025-06-26 NOTE — PROGRESS NOTES
I called. They had no record of a pneumonia vaccine. Patient did receive shingrix and covid in April. Both have been added to her chart.

## 2025-06-26 NOTE — PATIENT INSTRUCTIONS
Patient Active Problem List   Diagnosis Code    Urinary frequency R35.0    Light-headed feeling R42    Mixed hyperlipidemia E78.2    Senile osteoporosis M81.0    Primary insomnia F51.01    Nocturia R35.1    Status post carpal tunnel release Z98.890    Perennial allergic rhinitis with seasonal variation J30.89, J30.2    Ankle edema, bilateral M25.471, M25.472    External hemorrhoid K64.4    Osteoarthritis M19.90    Sinus headache R51.9    Benign essential hypertension I10    Other fatigue R53.83    Bilateral hearing loss due to cerumen impaction H61.23    Chronic pain of right knee M25.561, G89.29    Numbness and tingling of both feet R20.0, R20.2    Abnormal weight loss R63.4    Gastroesophageal reflux disease without esophagitis K21.9    History of TIA (transient ischemic attack) Z86.73    COVID-19 virus infection U07.1    Gas pain R14.1    Irritable bowel syndrome with constipation K58.1    Chronic kidney disease, stage 3a N18.31    Poor balance R26.89     Exercising to Stay Healthy  To become healthy and stay healthy, it is recommended that you do moderate-intensity and vigorous-intensity exercise. You can tell that you are exercising at a moderate intensity if your heart starts beating faster and you start breathing faster but can still hold a conversation. You can tell that you are exercising at a vigorous intensity if you are breathing much harder and faster and cannot hold a conversation while exercising.  How can exercise benefit me?  Exercising regularly is important. It has many health benefits, such as:  Improving overall fitness, flexibility, and endurance.  Increasing bone density.  Helping with weight control.  Decreasing body fat.  Increasing muscle strength and endurance.  Reducing stress and tension, anxiety, depression, or anger.  Improving overall health.  What guidelines should I follow while exercising?  Before you start a new exercise program, talk with your health care provider.  Do not  exercise so much that you hurt yourself, feel dizzy, or get very short of breath.  Wear comfortable clothes and wear shoes with good support.  Drink plenty of water while you exercise to prevent dehydration or heat stroke.  Work out until your breathing and your heartbeat get faster (moderate intensity).  How often should I exercise?  Choose an activity that you enjoy, and set realistic goals. Your health care provider can help you make an activity plan that is individually designed and works best for you.  Exercise regularly as told by your health care provider. This may include:  Doing strength training two times a week, such as:  Lifting weights.  Using resistance bands.  Push-ups.  Sit-ups.  Yoga.  Doing a certain intensity of exercise for a given amount of time. Choose from these options:  A total of 150 minutes of moderate-intensity exercise every week.  A total of 75 minutes of vigorous-intensity exercise every week.  A mix of moderate-intensity and vigorous-intensity exercise every week.  Children, pregnant women, people who have not exercised regularly, people who are overweight, and older adults may need to talk with a health care provider about what activities are safe to perform. If you have a medical condition, be sure to talk with your health care provider before you start a new exercise program.  What are some exercise ideas?  Moderate-intensity exercise ideas include:  Walking 1 mile (1.6 km) in about 15 minutes.  Biking.  Hiking.  Golfing.  Dancing.  Water aerobics.  Vigorous-intensity exercise ideas include:  Walking 4.5 miles (7.2 km) or more in about 1 hour.  Jogging or running 5 miles (8 km) in about 1 hour.  Biking 10 miles (16.1 km) or more in about 1 hour.  Lap swimming.  Roller-skating or in-line skating.  Cross-country skiing.  Vigorous competitive sports, such as football, basketball, and soccer.  Jumping rope.  Aerobic dancing.  What are some everyday activities that can help me get  exercise?  Yard work, such as:  Pushing a .  Raking and bagging leaves.  Washing your car.  Pushing a stroller.  Shoveling snow.  Gardening.  Washing windows or floors.  How can I be more active in my day-to-day activities?  Use stairs instead of an elevator.  Take a walk during your lunch break.  If you drive, park your car farther away from your work or school.  If you take public transportation, get off one stop early and walk the rest of the way.  Stand up or walk around during all of your indoor phone calls.  Get up, stretch, and walk around every 30 minutes throughout the day.  Enjoy exercise with a friend. Support to continue exercising will help you keep a regular routine of activity.  Where to find more information  You can find more information about exercising to stay healthy from:  [place] Department of Health and Human Services: www.hhs.gov  Centers for Disease Control and Prevention (CDC): www.cdc.gov  Summary  Exercising regularly is important. It will improve your overall fitness, flexibility, and endurance.  Regular exercise will also improve your overall health. It can help you control your weight, reduce stress, and improve your bone density.  Do not exercise so much that you hurt yourself, feel dizzy, or get very short of breath.  Before you start a new exercise program, talk with your health care provider.  This information is not intended to replace advice given to you by your health care provider. Make sure you discuss any questions you have with your health care provider.  Document Revised: 04/15/2022 Document Reviewed: 04/15/2022  [employer] Patient Education © 2023 [employer]

## 2025-06-26 NOTE — PROGRESS NOTES
The ABCs of the Annual Wellness Visit  Initial Medicare Wellness Visit    Chief Complaint   Patient presents with    Medicare Wellness-subsequent       Subjective   History of Present Illness:  Kamille Miles is a 89 y.o. female who presents for an Initial Medicare Wellness Visit.    History of Present Illness  The patient is here for an annual wellness visit.    She reports overall good health and mentions that she received her last Prolia injection in late March or early April. She has received the new 21-valent pneumonia vaccine at [facility] this year and is up to date on her COVID and flu vaccinations. She maintains a regular exercise routine, attending classes twice a week and participating in senior aerobics. She has been fostering kittens, which has resulted in some scratches on her arms. She reports a decrease in energy levels but notes an improvement when she remains active. She is currently taking vitamin D and B12 supplements.    She continues to experience nocturia, despite undergoing percutaneous tibial nerve stimulation (PTNS) once a week for 3 months under the care of a urologist. She has not yet tried pelvic floor exercises. She reports urinary frequency but no leakage or burning during urination. She has made dietary modifications, including reducing caffeine intake, which has improved her daytime symptoms.    She experiences excessive gas, which she manages with nightly fiber powder and monika chews. She also takes calcium supplements and questions if they could be contributing to her gas. She occasionally experiences loose stools or constipation, which are managed with fiber. She has reduced her yogurt intake due to concerns about gas production.    She experiences occasional heartburn and indigestion, which she manages with famotidine taken every evening. She is uncertain about its effectiveness. She avoids raw vegetables and includes kale in her diet.    She has noticed swelling in her ankles,  particularly after wearing sandals. She does not typically elevate her feet while sitting at home.    She has small hemorrhoids, which are asymptomatic. She reports no pain, itching, or bleeding. She requests a new prescription for hydrocortisone cream.    Her most recent blood pressure reading at home was 126/70. She reports no palpitations or tachycardia. She consumes caffeine sparingly, typically in the form of a Coke at noon and tea in the morning, and attempts to switch to decaf when possible.    She has difficulty swallowing large pills and is seeking a smaller calcium supplement. She has reduced her yogurt intake due to concerns about gas production.    She has observed hoarseness in her throat and a decreased ability to project her voice. She reports no difficulty swallowing, except for large pills.    CHRONIC CONDITIONS:    HEALTH RISK ASSESSMENT    Recent Hospitalizations:  She was not admitted to the hospital during the last year.       Current Medical Providers:  Patient Care Team:  Brisa Lord MD as PCP - General  Brisa Lord MD as PCP - Family Medicine  Td Eli MD as Consulting Physician (Urology)    Smoking Status:  Social History     Tobacco Use   Smoking Status Never    Passive exposure: Never   Smokeless Tobacco Never       Alcohol Consumption:  Social History     Substance and Sexual Activity   Alcohol Use Yes    Alcohol/week: 2.0 - 3.0 standard drinks of alcohol    Types: 2 - 3 Glasses of wine per week    Comment: 4-5 glasses per week of wine       Depression Screen:       6/26/2025     9:27 AM   PHQ-2/PHQ-9 Depression Screening   Little interest or pleasure in doing things Not at all   Feeling down, depressed, or hopeless Not at all   How difficult have these problems made it for you to do your work, take care of things at home, or get along with other people? Not difficult at all       Fall Risk Screen:  STEADI Fall Risk Assessment was completed, and patient is at LOW risk  for falls.Assessment completed on:2025    Health Habits and Functional and Cognitive Screenin/23/2025     5:02 PM   Functional & Cognitive Status   Do you have difficulty preparing food and eating? No   Do you have difficulty bathing yourself, getting dressed or grooming yourself? No   Do you have difficulty using the toilet? No   Do you have difficulty moving around from place to place? No   Do you have trouble with steps or getting out of a bed or a chair? No   Current Diet Well Balanced Diet   Dental Exam Up to date   Eye Exam Up to date   Exercise (times per week) 4 times per week   Current Exercises Include Aerobics;Walking   Do you need help using the phone?  No   Are you deaf or do you have serious difficulty hearing?  No   Do you need help to go to places out of walking distance? No   Do you need help shopping? No   Do you need help preparing meals?  No   Do you need help with housework?  No   Do you need help with laundry? No   Do you need help taking your medications? No   Do you need help managing money? No   Do you ever drive or ride in a car without wearing a seat belt? No   Have you felt unusual fatigue (could be tiredness), stress, anger or loneliness in the last month? No   Who do you live with? Spouse   If you need help, do you have trouble finding someone available to you? No   Have you been bothered in the last four weeks by sexual problems? No   Do you have difficulty concentrating, remembering or making decisions? No         Age-appropriate Screening Schedule:  Refer to the list below for future screening recommendations based on patient's age, sex and/or medical conditions. Orders for these recommended tests are listed in the plan section. The patient has been provided with a written plan.    Health Maintenance   Topic Date Due    ANNUAL WELLNESS VISIT  2025    INFLUENZA VACCINE  2025    LIPID PANEL  2026    DXA SCAN  2026    TDAP/TD VACCINES (3 - Td or  Tdap) 06/17/2032    COVID-19 Vaccine  Completed    RSV Vaccine - Adults  Completed    Pneumococcal Vaccine 50+  Completed    ZOSTER VACCINE  Completed    MAMMOGRAM  Discontinued        The following portions of the patient's history were reviewed and updated as appropriate: allergies, current medications, past family history, past medical history, past social history, past surgical history, and problem list.    Does the patient have evidence of cognitive impairment? No    Aspirin is on active medication list. Aspirin use is indicated based on review of current medical condition/s. Pros and cons of this therapy have been discussed today. Benefits of this medication outweigh potential harm.  Patient has been encouraged to continue taking this medication.  .      Outpatient Medications Prior to Visit   Medication Sig Dispense Refill    aspirin 81 MG EC tablet Take 1 tablet by mouth Daily.      Azelastine-Fluticasone 137-50 MCG/ACT suspension SHAKE LIQUID AND USE 1 SPRAY IN EACH NOSTRIL TWICE DAILY AS DIRECTED      Calcium 500-100 MG-UNIT chewable tablet Chew 1 tablet Every 4 (Four) Hours As Needed.      cholecalciferol (Vitamin D, Cholecalciferol,) 25 MCG (1000 UT) tablet Take 2 tablets by mouth Daily.      cyanocobalamin (VITAMIN B-12) 500 MCG tablet Take 1 tablet by mouth Daily. 90 tablet 1    estradiol (ESTRACE) 0.1 MG/GM vaginal cream APPLY 1 GRAM VAGINALLY TWICE A WEEK      fluticasone (FLONASE) 50 MCG/ACT nasal spray 1 spray into the nostril(s) as directed by provider 2 (Two) Times a Day. (Patient taking differently: Administer 1 spray into the nostril(s) as directed by provider 2 (Two) Times a Day. PRN) 16 g 5    Hydrocortisone, Perianal, (ANUSOL-HC) 2.5 % rectal cream Insert  into the rectum 2 (Two) Times a Day As Needed for Hemorrhoids. 28 g 3    polyethylene glycol (SB Polyethylene Glycol 3350) 17 GM/SCOOP powder Take 17 g by mouth Daily.      Probiotic Product (TRUBIOTICS PO) Take 1 capsule by mouth Daily.       zolpidem (AMBIEN) 5 MG tablet Take 0.5 tablets by mouth At Night As Needed for Sleep. 15 tablet 0    famotidine (PEPCID) 20 MG tablet TAKE 1 TABLET BY MOUTH EVERY NIGHT 90 tablet 1    hydrocortisone 1 % cream Apply  topically to the appropriate area as directed 2 (Two) Times a Day As Needed for Rash (itching). 1 each 0    lisinopril (PRINIVIL,ZESTRIL) 5 MG tablet TAKE 1 TABLET BY MOUTH DAILY 90 tablet 1    simvastatin (ZOCOR) 10 MG tablet TAKE 1 TABLET BY MOUTH EVERY EVENING 90 tablet 1    denosumab (PROLIA) 60 MG/ML solution prefilled syringe syringe Inject 1 mL under the skin into the appropriate area as directed 1 (One) Time for 1 dose. 1 mL 0     No facility-administered medications prior to visit.       Patient Active Problem List   Diagnosis    Urinary frequency    Light-headed feeling    Mixed hyperlipidemia    Senile osteoporosis    Primary insomnia    Nocturia    Status post carpal tunnel release    Perennial allergic rhinitis with seasonal variation    Ankle edema, bilateral    External hemorrhoid    Osteoarthritis    Sinus headache    Benign essential hypertension    Other fatigue    Bilateral hearing loss due to cerumen impaction    Chronic pain of right knee    Numbness and tingling of both feet    Abnormal weight loss    Gastroesophageal reflux disease without esophagitis    History of TIA (transient ischemic attack)    COVID-19 virus infection    Gas pain    Irritable bowel syndrome with constipation    Chronic kidney disease, stage 3a    Poor balance       Advanced Care Planning:  Advance Directive is not on file.  ACP discussion was held with the patient during this visit. Patient does not have an advance directive, information provided.    Review of Systems    Compared to one year ago, the patient feels her physical health is the same.  Compared to one year ago, the patient feels her mental health is the same.    Reviewed chart for potential of high risk medication in the elderly: yes  Reviewed  chart for potential of harmful drug interactions in the elderly:yes    Objective         Vitals:    06/26/25 0927   BP: 152/80   Pulse: 72   Temp: 98.4 °F (36.9 °C)   TempSrc: Infrared   SpO2: 100%   Weight: 54.6 kg (120 lb 6.4 oz)   PainSc: 0-No pain     BMI is within normal parameters. No other follow-up for BMI required.    Body mass index is 19.82 kg/m².  Discussed the patient's BMI with her. The BMI is in the acceptable range.    Physical Exam  Vitals and nursing note reviewed.   Constitutional:       Appearance: She is well-developed.   HENT:      Head: Normocephalic.   Eyes:      Conjunctiva/sclera: Conjunctivae normal.      Pupils: Pupils are equal, round, and reactive to light.   Neck:      Thyroid: No thyromegaly.   Cardiovascular:      Rate and Rhythm: Normal rate and regular rhythm.      Heart sounds: Normal heart sounds.      Comments: Trace bilateral ankle swelling.  No pitting.  Pulmonary:      Effort: Pulmonary effort is normal.      Breath sounds: Normal breath sounds. No wheezing.   Abdominal:      General: Bowel sounds are normal.      Palpations: Abdomen is soft.      Tenderness: There is no abdominal tenderness.   Musculoskeletal:         General: No tenderness. Normal range of motion.      Cervical back: Normal range of motion and neck supple. Deformity present.      Comments: Mild kyphosis   Lymphadenopathy:      Cervical: No cervical adenopathy.   Skin:     General: Skin is warm and dry.      Findings: No rash.   Neurological:      Mental Status: She is alert and oriented to person, place, and time.      Cranial Nerves: No cranial nerve deficit.      Sensory: No sensory deficit.      Coordination: Coordination normal.      Gait: Gait normal.   Psychiatric:         Attention and Perception: Attention normal.         Mood and Affect: Mood normal.         Speech: Speech normal.         Behavior: Behavior normal.         Thought Content: Thought content normal.         Cognition and Memory:  Cognition normal.         Judgment: Judgment normal.              Lab Results   Component Value Date    TRIG 73 05/16/2025    HDL 95 (H) 05/16/2025    LDL 89 05/16/2025    VLDL 13 05/16/2025        Assessment & Plan   Medicare Risks and Personalized Health Plan  CMS Preventative Services Quick Reference  Cardiovascular risk  Fall Risk    The above risks/problems have been discussed with the patient.  Pertinent information has been shared with the patient in the After Visit Summary.  Follow up plans and orders are seen below in the Assessment/Plan Section.  Patient Instructions   Problem List Items Addressed This Visit          Cardiac and Vasculature    Benign essential hypertension (Chronic)    Overview   Taking 5 mg lisinopril daily.           Relevant Medications    lisinopril (PRINIVIL,ZESTRIL) 5 MG tablet    Other Relevant Orders    CBC & Differential (Completed)    Comprehensive Metabolic Panel (Completed)    Microalbumin / Creatinine Urine Ratio - Urine, Clean Catch (Completed)    Urinalysis With Microscopic - Urine, Clean Catch (Completed)    Mixed hyperlipidemia    Overview   Taking simvastatin every evening.           Relevant Medications    simvastatin (ZOCOR) 10 MG tablet    Other Relevant Orders    Lipid Panel (Completed)    Homocysteine (Completed)    Vitamin B12 (Completed)       Endocrine and Metabolic    Abnormal weight loss (Chronic)    Relevant Orders    TSH (Completed)       Gastrointestinal Abdominal     Gastroesophageal reflux disease without esophagitis (Chronic)    Overview   EGD in [place] 2/9/2021 was normal.  Biopsies were normal.           Relevant Medications    famotidine (PEPCID) 20 MG tablet    Gas pain       Genitourinary and Reproductive     Urinary frequency    Overview   10/13/2020 Brisa Lord MD    She has tried several different medications, none of which seem to help.    Avoid caffeine and carbonated beverages.    Try to drink a lot of fluids early in the day and less  close to bedtime.         Relevant Orders    Ambulatory Referral to Physical Therapy for Evaluation & Treatment (Completed)    Nocturia - Primary    Relevant Orders    Ambulatory Referral to Physical Therapy for Evaluation & Treatment (Completed)    Chronic kidney disease, stage 3a       Musculoskeletal and Injuries    Senile osteoporosis    Overview   1/22/2020 DEXA scan showed osteopenia of the spine (T score -2.3) and mild osteopenia of the hip (T score -1.3).  T-scores were stable.    DEXA 6/2022 shows decline in T-scores.  However it is still in the osteopenia range.  Lumbar spine has declined to -2.3.  (Osteoporosis is -2.5 or less.)  Femoral neck has declined to -1.6.    8/2024 DEXA shows osteoporosis with the lowest T-score in the lumbar spine at -2.6.  The lowest T-score in the femoral neck is -1.8.  There has been worsening in the bone density since 6/2022 DEXA when the lowest T-score in the lumbar spine was -2.3.  Start alendronate weekly.            Ankle edema, bilateral     Other Visit Diagnoses         Vitamin D deficiency        Relevant Orders    Vitamin D,25-Hydroxy (Completed)              Objective   Vital Signs:  /80   Pulse 72   Temp 98.4 °F (36.9 °C) (Infrared)   Wt 54.6 kg (120 lb 6.4 oz)   SpO2 100%   BMI 19.82 kg/m²     The following data was reviewed by: Brisa Lord MD on 06/26/2025:  CMP          5/16/2025    08:31   CMP   Glucose 99    BUN 20    Creatinine 1.27    EGFR 41.0    Sodium 141    Potassium 4.2    Chloride 102    Calcium 10.0    Total Protein 7.7    Albumin 4.4    Globulin 3.3    Total Bilirubin 0.7    Alkaline Phosphatase 53    AST (SGOT) 21    ALT (SGPT) 14    Albumin/Globulin Ratio 1.3    BUN/Creatinine Ratio 15.7    Anion Gap 10.6      CBC          5/16/2025    08:31   CBC   WBC 5.52    RBC 4.66    Hemoglobin 14.8    Hematocrit 44.4    MCV 95.3    MCH 31.8    MCHC 33.3    RDW 12.4    Platelets 221      CBC w/diff          5/16/2025    08:31   CBC w/Diff    WBC 5.52    RBC 4.66    Hemoglobin 14.8    Hematocrit 44.4    MCV 95.3    MCH 31.8    MCHC 33.3    RDW 12.4    Platelets 221    Neutrophil Rel % 46.4    Immature Granulocyte Rel % 0.2    Lymphocyte Rel % 41.8    Monocyte Rel % 9.6    Eosinophil Rel % 1.6    Basophil Rel % 0.4      Lipid Panel          5/16/2025    08:31   Lipid Panel   Total Cholesterol 197    Triglycerides 73    HDL Cholesterol 95    VLDL Cholesterol 13    LDL Cholesterol  89    LDL/HDL Ratio 0.92      TSH          5/16/2025    08:31   TSH   TSH 1.630        Results  Labs   - Creatinine: Increased   - Filtration Rate: Decreased   - Liver Enzymes: Normal   - Blood Sugar: Normal   - LDL Cholesterol: 89 mg/dL   - HDL Cholesterol: Normal   - Protein in Urine: No protein loss   - Thyroid Function: Normal   - Vitamin D: 70 ng/mL   - B12: Normal   - Red Blood Cells: Normal   - White Blood Cells: Normal   - Urinalysis: Clear        Assessment and Plan   Diagnoses and all orders for this visit:    1. Nocturia (Primary)  -     Ambulatory Referral to Physical Therapy for Evaluation & Treatment    2. Mixed hyperlipidemia  -     Lipid Panel; Future  -     Homocysteine; Future  -     Vitamin B12; Future  -     simvastatin (ZOCOR) 10 MG tablet; Take 1 tablet by mouth Every Evening.  Dispense: 90 tablet; Refill: 1    3. Benign essential hypertension  -     CBC & Differential; Future  -     Comprehensive Metabolic Panel; Future  -     Microalbumin / Creatinine Urine Ratio - Urine, Clean Catch; Future  -     Urinalysis With Microscopic - Urine, Clean Catch; Future  -     lisinopril (PRINIVIL,ZESTRIL) 5 MG tablet; Take 1 tablet by mouth Daily.  Dispense: 90 tablet; Refill: 1    4. Abnormal weight loss  -     TSH; Future    5. Chronic kidney disease, stage 3a    6. Senile osteoporosis    7. Vitamin D deficiency  -     Vitamin D,25-Hydroxy; Future    8. Urinary frequency  -     Ambulatory Referral to Physical Therapy for Evaluation & Treatment    9. Gas pain    10.  Gastroesophageal reflux disease without esophagitis    11. Ankle edema, bilateral    Other orders  -     famotidine (PEPCID) 20 MG tablet; Take 1 tablet by mouth Every Night.  Dispense: 90 tablet; Refill: 3  -     hydrocortisone 1 % cream; Apply  topically to the appropriate area as directed 2 (Two) Times a Day As Needed for Rash (itching).  Dispense: 1 each; Refill: 0        Assessment & Plan  Annual wellness visit.  - Weight stable over the past year, slight increase from 119 to 120 pounds.  - Laboratory results generally satisfactory, slight decline in kidney function (elevated creatinine, decreased GFR), likely due to inadequate fluid intake.  - Liver enzymes, blood glucose, cholesterol, thyroid function, and blood cell counts all within normal ranges. Vitamin D level slightly elevated at 70.  - Urinalysis unremarkable, no signs of infection or hematuria.  - Advised to maintain current medication regimen, increase water intake by a few glasses daily (preferably earlier in the day), continue exercise routine, and incorporate more protein into diet.    Hypertension.  - Blood pressure readings at home within desired range, tend to be elevated during office visits.  - Advised to continue low-dose lisinopril daily.  Continue to avoid salt in the diet.    Abnormal weight loss  Her weight is now stable.  She will continue to watch.  Continue trying to eat protein in the diet 3 times a day and eat plenty of vegetables and fruits.    Chronic kidney disease 3a  Continue drinking lots of fluids every day.  Continue to avoid NSAIDs.    Osteoporosis  Continue Prolia injections every 6 months.  She will try calcium citrate 250 mg tablets and take 2 twice a day.  They will be easier to swallow.  Continue taking vitamin D3 daily.  Continue walking and using small hand weights at home.    Nocturia.  Urinary frequency.  - Reports ongoing nocturia, previously underwent PTNS (percutaneous tibial nerve stimulator) without  significant improvement.  She denies any dysuria.  - Pelvic floor exercises recommended to manage symptoms.  - Referral for physical therapy will be provided.    Gas and bloating.  GERD.  - Experiences frequent gas and bloating and occasional heartburn depending on diet.  -She will continue taking famotidine every evening.  -continue monitoring diet and avoid foods that trigger symptoms.  - Advised to continue fiber supplement, avoid cruciferous vegetables (broccoli, cauliflower, cabbage, Ruffin sprouts), especially when raw.  Avoid iceberg lettuce.  Eat spinach and kale and mustard greens and lawson lettuce instead.    Mild ankle swelling.  - Reports mild swelling in ankles, particularly at the end of the day.  - Advised to elevate feet when sitting at home, reduce salt intake, increase water consumption, and consider wearing support socks that extend to the knee, especially during travel.    Hemorrhoids.  - Small hemorrhoids present, no pain, itching, or bleeding.  - Prescription for hydrocortisone cream will be provided.      Follow-up  - Follow up in 1 year.            Follow Up   Return in about 1 year (around 6/26/2026) for Medicare Wellness.  Patient was given instructions and counseling regarding her condition or for health maintenance advice. Please see specific information pulled into the AVS if appropriate.     Patient or patient representative verbalized consent for the use of Ambient Listening during the visit with  Brisa Lord MD for chart documentation. 6/26/2025  14:10 EDT

## 2025-08-18 ENCOUNTER — APPOINTMENT (OUTPATIENT)
Dept: GENERAL RADIOLOGY | Facility: HOSPITAL | Age: 89
DRG: 872 | End: 2025-08-18
Payer: MEDICARE

## 2025-08-18 ENCOUNTER — HOSPITAL ENCOUNTER (EMERGENCY)
Facility: HOSPITAL | Age: 89
Discharge: HOME | DRG: 872 | End: 2025-08-18
Attending: EMERGENCY MEDICINE | Admitting: EMERGENCY MEDICINE
Payer: MEDICARE

## 2025-08-18 RX ORDER — BENZOCAINE/MENTHOL 6 MG-10 MG
LOZENGE MUCOUS MEMBRANE
Qty: 28 G | Refills: 1 | Status: SHIPPED | OUTPATIENT
Start: 2025-08-18

## 2025-08-19 ENCOUNTER — RESULTS FOLLOW-UP (OUTPATIENT)
Dept: EMERGENCY DEPT | Facility: HOSPITAL | Age: 89
End: 2025-08-19
Payer: MEDICARE

## 2025-08-19 ENCOUNTER — HOSPITAL ENCOUNTER (INPATIENT)
Facility: HOSPITAL | Age: 89
LOS: 1 days | Discharge: HOME | DRG: 872 | End: 2025-08-20
Attending: EMERGENCY MEDICINE | Admitting: FAMILY MEDICINE
Payer: MEDICARE

## 2025-08-19 PROBLEM — R78.81 BACTEREMIA: Status: ACTIVE | Noted: 2025-08-19

## 2025-08-22 DIAGNOSIS — E78.2 MIXED HYPERLIPIDEMIA: ICD-10-CM

## 2025-08-22 RX ORDER — SIMVASTATIN 10 MG
10 TABLET ORAL EVERY EVENING
Qty: 90 TABLET | Refills: 1 | Status: SHIPPED | OUTPATIENT
Start: 2025-08-22

## 2025-08-25 ENCOUNTER — TRANSCRIBE ORDERS (OUTPATIENT)
Dept: LAB | Facility: HOSPITAL | Age: 89
End: 2025-08-25
Payer: MEDICARE

## 2025-08-25 ENCOUNTER — LAB (OUTPATIENT)
Dept: LAB | Facility: HOSPITAL | Age: 89
End: 2025-08-25
Payer: MEDICARE

## 2025-08-25 DIAGNOSIS — L03.116 CELLULITIS OF LEG, LEFT: Primary | ICD-10-CM

## 2025-08-25 LAB
ALBUMIN SERPL-MCNC: 4 G/DL (ref 3.5–5.2)
ALBUMIN/GLOB SERPL: 1.3 G/DL
ALP SERPL-CCNC: 50 U/L (ref 39–117)
ALT SERPL W P-5'-P-CCNC: 15 U/L (ref 1–33)
ANION GAP SERPL CALCULATED.3IONS-SCNC: 8.4 MMOL/L (ref 5–15)
AST SERPL-CCNC: 19 U/L (ref 1–32)
BASOPHILS # BLD AUTO: 0.03 10*3/MM3 (ref 0–0.2)
BASOPHILS NFR BLD AUTO: 0.5 % (ref 0–1.5)
BILIRUB SERPL-MCNC: 0.3 MG/DL (ref 0–1.2)
BUN SERPL-MCNC: 16 MG/DL (ref 8–23)
BUN/CREAT SERPL: 16.8 (ref 7–25)
CALCIUM SPEC-SCNC: 9.6 MG/DL (ref 8.6–10.5)
CHLORIDE SERPL-SCNC: 101 MMOL/L (ref 98–107)
CO2 SERPL-SCNC: 28.6 MMOL/L (ref 22–29)
CREAT SERPL-MCNC: 0.95 MG/DL (ref 0.57–1)
CRP SERPL-MCNC: 0.59 MG/DL (ref 0–0.5)
DEPRECATED RDW RBC AUTO: 44.5 FL (ref 37–54)
EGFRCR SERPLBLD CKD-EPI 2021: 57.7 ML/MIN/1.73
EOSINOPHIL # BLD AUTO: 0.1 10*3/MM3 (ref 0–0.4)
EOSINOPHIL NFR BLD AUTO: 1.7 % (ref 0.3–6.2)
ERYTHROCYTE [DISTWIDTH] IN BLOOD BY AUTOMATED COUNT: 12.8 % (ref 12.3–15.4)
ERYTHROCYTE [SEDIMENTATION RATE] IN BLOOD: 27 MM/HR (ref 0–30)
GLOBULIN UR ELPH-MCNC: 3 GM/DL
GLUCOSE SERPL-MCNC: 99 MG/DL (ref 65–99)
HCT VFR BLD AUTO: 40.5 % (ref 34–46.6)
HGB BLD-MCNC: 13 G/DL (ref 12–15.9)
IMM GRANULOCYTES # BLD AUTO: 0.02 10*3/MM3 (ref 0–0.05)
IMM GRANULOCYTES NFR BLD AUTO: 0.3 % (ref 0–0.5)
LYMPHOCYTES # BLD AUTO: 1.3 10*3/MM3 (ref 0.7–3.1)
LYMPHOCYTES NFR BLD AUTO: 22.6 % (ref 19.6–45.3)
MCH RBC QN AUTO: 30.4 PG (ref 26.6–33)
MCHC RBC AUTO-ENTMCNC: 32.1 G/DL (ref 31.5–35.7)
MCV RBC AUTO: 94.6 FL (ref 79–97)
MONOCYTES # BLD AUTO: 0.58 10*3/MM3 (ref 0.1–0.9)
MONOCYTES NFR BLD AUTO: 10.1 % (ref 5–12)
NEUTROPHILS NFR BLD AUTO: 3.72 10*3/MM3 (ref 1.7–7)
NEUTROPHILS NFR BLD AUTO: 64.8 % (ref 42.7–76)
NRBC BLD AUTO-RTO: 0 /100 WBC (ref 0–0.2)
PLATELET # BLD AUTO: 238 10*3/MM3 (ref 140–450)
PMV BLD AUTO: 10.1 FL (ref 6–12)
POTASSIUM SERPL-SCNC: 4.3 MMOL/L (ref 3.5–5.2)
PROT SERPL-MCNC: 7 G/DL (ref 6–8.5)
RBC # BLD AUTO: 4.28 10*6/MM3 (ref 3.77–5.28)
SODIUM SERPL-SCNC: 138 MMOL/L (ref 136–145)
WBC NRBC COR # BLD AUTO: 5.75 10*3/MM3 (ref 3.4–10.8)

## 2025-08-25 PROCEDURE — 36415 COLL VENOUS BLD VENIPUNCTURE: CPT | Performed by: INTERNAL MEDICINE

## 2025-08-25 PROCEDURE — 85652 RBC SED RATE AUTOMATED: CPT | Performed by: INTERNAL MEDICINE

## 2025-08-25 PROCEDURE — 86140 C-REACTIVE PROTEIN: CPT | Performed by: INTERNAL MEDICINE

## 2025-08-25 PROCEDURE — 85025 COMPLETE CBC W/AUTO DIFF WBC: CPT | Performed by: INTERNAL MEDICINE

## 2025-08-25 PROCEDURE — 80053 COMPREHEN METABOLIC PANEL: CPT | Performed by: INTERNAL MEDICINE
